# Patient Record
Sex: FEMALE | Race: WHITE | NOT HISPANIC OR LATINO | Employment: UNEMPLOYED | URBAN - NONMETROPOLITAN AREA
[De-identification: names, ages, dates, MRNs, and addresses within clinical notes are randomized per-mention and may not be internally consistent; named-entity substitution may affect disease eponyms.]

---

## 2017-03-01 ENCOUNTER — OFFICE VISIT (OUTPATIENT)
Dept: ENDOCRINOLOGY | Facility: CLINIC | Age: 37
End: 2017-03-01

## 2017-03-01 VITALS
HEART RATE: 105 BPM | BODY MASS INDEX: 39.44 KG/M2 | DIASTOLIC BLOOD PRESSURE: 78 MMHG | WEIGHT: 245.4 LBS | HEIGHT: 66 IN | SYSTOLIC BLOOD PRESSURE: 102 MMHG

## 2017-03-01 DIAGNOSIS — E55.9 VITAMIN D DEFICIENCY: ICD-10-CM

## 2017-03-01 DIAGNOSIS — Z85.850 HISTORY OF THYROID CANCER: ICD-10-CM

## 2017-03-01 DIAGNOSIS — I10 ESSENTIAL HYPERTENSION: ICD-10-CM

## 2017-03-01 DIAGNOSIS — E89.0 POSTOPERATIVE HYPOTHYROIDISM: Primary | ICD-10-CM

## 2017-03-01 DIAGNOSIS — Z72.0 TOBACCO ABUSE: ICD-10-CM

## 2017-03-01 DIAGNOSIS — E11.9 CONTROLLED TYPE 2 DIABETES MELLITUS WITHOUT COMPLICATION, WITHOUT LONG-TERM CURRENT USE OF INSULIN (HCC): ICD-10-CM

## 2017-03-01 PROCEDURE — 99214 OFFICE O/P EST MOD 30 MIN: CPT | Performed by: NURSE PRACTITIONER

## 2017-03-01 RX ORDER — LISINOPRIL 10 MG/1
1 TABLET ORAL DAILY
Refills: 1 | COMMUNITY
Start: 2017-01-26 | End: 2017-12-06

## 2017-03-01 RX ORDER — LEVOTHYROXINE SODIUM 175 UG/1
175 TABLET ORAL DAILY
Qty: 45 TABLET | Refills: 11 | Status: SHIPPED | OUTPATIENT
Start: 2017-03-01 | End: 2018-03-09 | Stop reason: SDUPTHER

## 2017-03-01 NOTE — PROGRESS NOTES
Subjective    Trinasudha Fonseca is a 36 y.o. female. she is here today for follow-up.    History of Present Illness         36 y o comes for followup      reason post surgical hypothyroidism , history of thyroid cancer     ======================================     CONTEXT     Patient initially had enlarging right thryoid nodule  Workup led to [facility] dx Thryoid cancer     ======================================     DURATION AND ALLEVIATING FACTORS      4-18-14 : Tot Thyroidectomy     6-24-14 : HUTCHINS Ablation with TSH of 131      on synthroid  dose is 200mcgs daily , on 175 TSH was 7   dose was increased back to 200 but TSH suppression was below target     she was advised to 6.5 tabs per week but understood only half a tab daily and TSH now 66. With this TSH her Tg is only 0.7      ======================================     QUALITY - Thyroid Cancer well controlled.   Hypothryoidism Not Controlled      June 26, 2014      intense activity in the thyroid bed     June 2014     Tg 5     Aug 2014  TSH 7 , Tg undetectable      June 2015      TSH 80  Tg ab neg  Tg never done?     July 2016  TSH 66  Tg 0.7      ======================================  LOCATION - SIZE      Pathology Report 4-18-14     Right Lobe  a. Follicular Ca with invasion into but not through capsule with one focus of vascular invasion but confined to the thryoid  Size 6.1 cm      b. 3.5 mm Papillary Thyroid Ca     Left Lobe     a. 2 cm Infarcted nodule - no dx of ca made  b. 3 mm micropapillary thyroid Ca      THIS REPORT WAS SENT FOR SCANNING     ======================================  SEVERITY      Has to be considered intermediate due to vascular invasion as reported above  Size of 6 cm can be considered an intermediate risk factor, at least severe enough to have justified HUTCHINS ablation      ======================================  SYMPTOMS     Fatigue, Cold Intolerance, Weight Gain , Depression , Anxiety   have improved   ======================================      TIMING      Cured  June 2015 WBS with TSH of 80 , only physiologic uptake  Tg pending              Evaluation history:    HISTORY- Prior history of follicular and papillary thyroid cancer.  Prior iodine-131 ablation June 2014.              Following the oral administration of 5.0 mCi of iodine-131 whole body  imaging was obtained. Images obtained both 24 hours and 48 hours  following ingestion.          Findings, conclusion-  There is physiological uptake in the salivary glands and nasopharynx.  There is physiological uptake in bladder and bowel.   Whole-body imaging study is otherwise unremarkable. No findings  suggesting recurrent or metastatic disease.  Electronically Signed By- Lion Duggan MD On: 2015-06-12 15:25:17      TSH   Date Value Ref Range Status   07/25/2016 66.00 (H) 0.46 - 4.68 uIU/ml Final     FREE T4   Date Value Ref Range Status   08/08/2014 1.69 0.78 - 2.19 ng/dl Final       Current medications:  Current Outpatient Prescriptions   Medication Sig Dispense Refill   • cetirizine (ZyrTEC) 10 MG tablet Take 10 mg by mouth daily.     • clonazePAM (KlonoPIN) 0.5 MG tablet Take 0.25-0.5 mg by mouth every 12 (twelve) hours as needed for seizures.     • DULoxetine (CYMBALTA) 60 MG capsule TAKE ONE CAPSULE BY MOUTH EVERY DAY  0   • gabapentin (NEURONTIN) 100 MG capsule TAKE 1 TAB BY MOUTH AT BEDTIME  3   • hydrochlorothiazide (HYDRODIURIL) 25 MG tablet Take 25 mg by mouth daily.     • levothyroxine (SYNTHROID, LEVOTHROID) 175 MCG tablet Take 1 tablet by mouth Daily. One tablet everyday except Sunday take 2 tablets 45 tablet 11   • lisinopril (PRINIVIL,ZESTRIL) 10 MG tablet Take 1 tablet by mouth Daily.  1   • metFORMIN (GLUCOPHAGE) 500 MG tablet Take 1 tablet by mouth 2 (Two) Times a Day With Meals. Two tablets po  tablet 11   • montelukast (SINGULAIR) 10 MG tablet Take 10 mg by mouth.     • QUEtiapine (SEROquel) 25 MG tablet TAKE 1 TO 2 TABS BY MOUTH EVERY DAY AT BEDTIME  0   • SITagliptin  (JANUVIA) 100 MG tablet 100 mg by mouth  daily before breakfast 30 tablet 11     No current facility-administered medications for this visit.        The following portions of the patient's history were reviewed and updated as appropriate:   Past Medical History   Diagnosis Date   • Anxiety state    • Endogenous obesity    • History of malignant neoplasm of thyroid    • Postoperative hypothyroidism    • Type 2 diabetes mellitus    • Vitamin D deficiency      Past Surgical History   Procedure Laterality Date   • Thyroidectomy       Family History   Problem Relation Age of Onset   • Hypertension Other    • Thyroid disease Other      OB History     No data available        No Known Allergies  Social History     Social History   • Marital status:      Spouse name: N/A   • Number of children: N/A   • Years of education: N/A     Social History Main Topics   • Smoking status: Current Every Day Smoker   • Smokeless tobacco: None   • Alcohol use None   • Drug use: None   • Sexual activity: Not Asked     Other Topics Concern   • None     Social History Narrative       Review of Systems  Review of Systems   Constitutional: Negative for activity change, appetite change, chills, diaphoresis and fatigue.   HENT: Negative for congestion, dental problem, drooling, ear discharge, ear pain, facial swelling, sneezing, sore throat, tinnitus, trouble swallowing and voice change.    Eyes: Negative for photophobia, pain, discharge, redness, itching and visual disturbance.   Respiratory: Negative for apnea, cough, choking, chest tightness and shortness of breath.    Cardiovascular: Negative for chest pain, palpitations and leg swelling.   Gastrointestinal: Negative for abdominal distention, abdominal pain, constipation, diarrhea, nausea and vomiting.   Endocrine: Negative for cold intolerance, heat intolerance, polydipsia, polyphagia and polyuria.   Genitourinary: Negative for difficulty urinating, dysuria, frequency, hematuria and  "urgency.   Musculoskeletal: Negative for arthralgias, back pain, gait problem, joint swelling, myalgias, neck pain and neck stiffness.   Skin: Negative for color change, pallor, rash and wound.   Allergic/Immunologic: Negative for environmental allergies, food allergies and immunocompromised state.   Neurological: Negative for dizziness, tremors, facial asymmetry, weakness, light-headedness, numbness and headaches.   Hematological: Negative for adenopathy. Does not bruise/bleed easily.   Psychiatric/Behavioral: Negative for agitation, behavioral problems, confusion, decreased concentration and sleep disturbance.        Objective      Visit Vitals   • /78 (BP Location: Right arm, Patient Position: Sitting, Cuff Size: Adult)   • Pulse 105   • Ht 66\" (167.6 cm)   • Wt 245 lb 6.4 oz (111 kg)   • BMI 39.61 kg/m2     Physical Exam   Constitutional: She is oriented to person, place, and time. She appears well-developed and well-nourished. No distress.   HENT:   Head: Normocephalic and atraumatic.   Right Ear: External ear normal.   Left Ear: External ear normal.   Nose: Nose normal.   Eyes: Conjunctivae and EOM are normal. Pupils are equal, round, and reactive to light.   Neck: Normal range of motion. Neck supple. No tracheal deviation present.   Thyroid surgically absent   Cardiovascular: Normal rate, regular rhythm and normal heart sounds.    No murmur heard.  Pulmonary/Chest: Effort normal and breath sounds normal. No respiratory distress. She has no wheezes.   Abdominal: Soft. Bowel sounds are normal. There is no tenderness. There is no rebound and no guarding.   Musculoskeletal: Normal range of motion. She exhibits no edema, tenderness or deformity.   Neurological: She is alert and oriented to person, place, and time. No cranial nerve deficit.   Skin: Skin is warm and dry. No rash noted.   Psychiatric: She has a normal mood and affect. Her behavior is normal. Judgment and thought content normal.       Lab " Review  Lab Results   Component Value Date    TSH 66.00 (H) 07/25/2016     Lab Results   Component Value Date    FREET4 1.69 08/08/2014        Assessment/Plan      1. Postoperative hypothyroidism    2. Controlled type 2 diabetes mellitus without complication, without long-term current use of insulin    3. Essential hypertension    4. Vitamin D deficiency    5. Tobacco abuse    6. History of thyroid cancer    . This diagnosis was discussed and reviewed with the patient including the advantages of drug therapy.     1. Orders placed during this encounter include:  Orders Placed This Encounter   Procedures   • US Thyroid     Schedule in June; need same day as appointment with me     Order Specific Question:   Reason for Exam:     Answer:   history of thyroid cancer   • Comprehensive Metabolic Panel   • Hemoglobin A1c   • TSH   • Vitamin D 25 Hydroxy   • T4, Free   • T3   • Thyroglobulin With Anti-TG       Medications prescribed:  Outpatient Encounter Prescriptions as of 3/1/2017   Medication Sig Dispense Refill   • cetirizine (ZyrTEC) 10 MG tablet Take 10 mg by mouth daily.     • clonazePAM (KlonoPIN) 0.5 MG tablet Take 0.25-0.5 mg by mouth every 12 (twelve) hours as needed for seizures.     • DULoxetine (CYMBALTA) 60 MG capsule TAKE ONE CAPSULE BY MOUTH EVERY DAY  0   • gabapentin (NEURONTIN) 100 MG capsule TAKE 1 TAB BY MOUTH AT BEDTIME  3   • hydrochlorothiazide (HYDRODIURIL) 25 MG tablet Take 25 mg by mouth daily.     • levothyroxine (SYNTHROID, LEVOTHROID) 175 MCG tablet Take 1 tablet by mouth Daily. One tablet everyday except Sunday take 2 tablets 45 tablet 11   • lisinopril (PRINIVIL,ZESTRIL) 10 MG tablet Take 1 tablet by mouth Daily.  1   • metFORMIN (GLUCOPHAGE) 500 MG tablet Take 1 tablet by mouth 2 (Two) Times a Day With Meals. Two tablets po  tablet 11   • montelukast (SINGULAIR) 10 MG tablet Take 10 mg by mouth.     • QUEtiapine (SEROquel) 25 MG tablet TAKE 1 TO 2 TABS BY MOUTH EVERY DAY AT BEDTIME   0   • [DISCONTINUED] levothyroxine (SYNTHROID, LEVOTHROID) 175 MCG tablet Take 1 tablet by mouth Daily. One tablet everyday except Sunday take 2 tablets 45 tablet 11   • SITagliptin (JANUVIA) 100 MG tablet 100 mg by mouth  daily before breakfast 30 tablet 11   • [DISCONTINUED] butalbital-acetaminophen-caffeine (FIORICET) -40 MG per tablet Take  by mouth as needed for headaches.     • [DISCONTINUED] Canagliflozin 100 MG tablet One tablet daily by mouth before breakfast 30 tablet 11   • [DISCONTINUED] Omega-3 Fatty Acids (FISH OIL CONCENTRATE) 1000 MG capsule Take 1 capsule by mouth daily.     • [DISCONTINUED] sertraline (ZOLOFT) 50 MG tablet Take 50 mg by mouth daily.     • [DISCONTINUED] vitamin D (ERGOCALCIFEROL) 66202 UNITS capsule capsule Take 100,000 Units by mouth 1 (one) time per week.       No facility-administered encounter medications on file as of 3/1/2017.          Postoperative Hypothyroidism  History of Thyroid Cancer  [id number]  Stage I     according to [facility] Thyroid Ca Guidelines , postop staging - I consider intermediate risk since vascular invasion per pathology report        Tot Thyroidectomy - 4-14  HUTCHINS Ablation - 6-14     WBS June 2015 with TSH of 80 , nl physiologic uptake  tg ab neg  pending Tg     July 2016   TSH 66  Tg 0.7     Excellent response      Target TSH according to [facility] Recommendation #70 is 0.5 to 2        4-18-14 : Tot Thyroidectomy     6-24-14 : HUTCHINS Ablation with TSH of 131      on synthroid  on 175 TSH was 7   dose was increased back to 200 but TSH suppression was below target     she was advised to 6.5 tabs per week but understood only half a tab daily and TSH now 66. With this TSH her Tg is only 0.7      change to 175 mcgs daily      Oct. 2016     TSH - 19.80     Keep 175 mcg daily and take two tablets on Sunday      Tg ab <1    Feb. 2017    TSH - 1.38    Keep current diet dosage        Need neck ultrasound --schedule here and have an appt after the ultrasound   --     Diabetes       aic at goal on metformin , aic less than 7     Oct. 2016      Hgb A1c - 8.6        metformin 500 mg daily -- increase to one bid for one week then increase to two po bid --just now increased to 1000 mg po BID      invokana 100mg daily --- stopped     Feb. 2017    HgbA1c - 8.7%     Keep metformin     Start Januvia 100 mg one breakfast     Discussed victoza she does not want to inject        --     vit d Def - increase to 50 th u weekly          ----------------------    Tobacco Abuse     Smoking cessation discussed         4. Return in about 3 months (around 6/1/2017) for Recheck.

## 2017-06-06 ENCOUNTER — HOSPITAL ENCOUNTER (OUTPATIENT)
Dept: ULTRASOUND IMAGING | Facility: HOSPITAL | Age: 37
Discharge: HOME | End: 2017-06-06

## 2017-06-12 ENCOUNTER — APPOINTMENT (OUTPATIENT)
Dept: ULTRASOUND IMAGING | Facility: HOSPITAL | Age: 37
End: 2017-06-12

## 2017-06-14 ENCOUNTER — APPOINTMENT (OUTPATIENT)
Dept: ULTRASOUND IMAGING | Facility: HOSPITAL | Age: 37
End: 2017-06-14

## 2017-07-12 ENCOUNTER — HOSPITAL ENCOUNTER (OUTPATIENT)
Dept: ULTRASOUND IMAGING | Facility: HOSPITAL | Age: 37
Discharge: HOME | End: 2017-07-12
Admitting: NURSE PRACTITIONER

## 2017-07-12 ENCOUNTER — OFFICE VISIT (OUTPATIENT)
Dept: ENDOCRINOLOGY | Facility: CLINIC | Age: 37
End: 2017-07-12

## 2017-07-12 VITALS
HEART RATE: 97 BPM | SYSTOLIC BLOOD PRESSURE: 124 MMHG | HEIGHT: 66 IN | WEIGHT: 234.7 LBS | BODY MASS INDEX: 37.72 KG/M2 | DIASTOLIC BLOOD PRESSURE: 76 MMHG

## 2017-07-12 DIAGNOSIS — Z72.0 TOBACCO ABUSE: ICD-10-CM

## 2017-07-12 DIAGNOSIS — E89.0 POSTOPERATIVE HYPOTHYROIDISM: Primary | ICD-10-CM

## 2017-07-12 DIAGNOSIS — E55.9 VITAMIN D DEFICIENCY: ICD-10-CM

## 2017-07-12 DIAGNOSIS — E11.9 CONTROLLED TYPE 2 DIABETES MELLITUS WITHOUT COMPLICATION, WITHOUT LONG-TERM CURRENT USE OF INSULIN (HCC): ICD-10-CM

## 2017-07-12 DIAGNOSIS — Z85.850 HISTORY OF THYROID CANCER: ICD-10-CM

## 2017-07-12 PROCEDURE — 99214 OFFICE O/P EST MOD 30 MIN: CPT | Performed by: NURSE PRACTITIONER

## 2017-07-12 PROCEDURE — 76536 US EXAM OF HEAD AND NECK: CPT

## 2017-07-12 RX ORDER — DULOXETIN HYDROCHLORIDE 60 MG/1
60 CAPSULE, DELAYED RELEASE ORAL DAILY
COMMUNITY
End: 2020-07-31 | Stop reason: SDUPTHER

## 2017-07-12 RX ORDER — MONTELUKAST SODIUM 10 MG/1
10 TABLET ORAL
COMMUNITY
End: 2017-12-06

## 2017-07-12 RX ORDER — CETIRIZINE HYDROCHLORIDE 10 MG/1
10 TABLET ORAL DAILY
COMMUNITY

## 2017-07-12 RX ORDER — QUETIAPINE FUMARATE 25 MG/1
25 TABLET, FILM COATED ORAL
COMMUNITY
End: 2017-12-06

## 2017-07-12 RX ORDER — LISINOPRIL 10 MG/1
10 TABLET ORAL
COMMUNITY
Start: 2017-01-26 | End: 2017-12-06

## 2017-07-12 RX ORDER — ALBUTEROL SULFATE 90 UG/1
180 AEROSOL, METERED RESPIRATORY (INHALATION)
COMMUNITY
End: 2017-12-06

## 2017-07-12 NOTE — PROGRESS NOTES
Subjective    Trina Fonseca is a 37 y.o. female. she is here today for follow-up.    History of Present Illness       37 y o comes for followup     Patient was dealing with depression and stopped all medications for one month -- now restarted      reason post surgical hypothyroidism , history of thyroid cancer     ======================================     CONTEXT     Patient initially had enlarging right thryoid nodule  Workup led to [facility] dx Thryoid cancer     ======================================     DURATION AND ALLEVIATING FACTORS      4-18-14 : Tot Thyroidectomy     6-24-14 : HUTCHNIS Ablation with TSH of 131      on synthroid  dose is 200mcgs daily , on 175 TSH was 7   dose was increased back to 200 but TSH suppression was below target     she was advised to 6.5 tabs per week but understood only half a tab daily and TSH now 66. With this TSH her Tg is only 0.7      ======================================     QUALITY - Thyroid Cancer well controlled.   Hypothryoidism Not Controlled      June 26, 2014      intense activity in the thyroid bed     June 2014     Tg 5     Aug 2014  TSH 7 , Tg undetectable      June 2015      TSH 80  Tg ab neg  Tg never done?     July 2016  TSH 66  Tg 0.7      ======================================  LOCATION - SIZE      Pathology Report 4-18-14     Right Lobe  a. Follicular Ca with invasion into but not through capsule with one focus of vascular invasion but confined to the thryoid  Size 6.1 cm      b. 3.5 mm Papillary Thyroid Ca     Left Lobe     a. 2 cm Infarcted nodule - no dx of ca made  b. 3 mm micropapillary thyroid Ca      THIS REPORT WAS SENT FOR SCANNING     ======================================  SEVERITY      Has to be considered intermediate due to vascular invasion as reported above  Size of 6 cm can be considered an intermediate risk factor, at least severe enough to have justified HUTCHINS ablation      ======================================  SYMPTOMS     Fatigue, Cold  Intolerance, Weight Gain , Depression , Anxiety   have improved   ======================================     TIMING      Cured  June 2015 WBS with TSH of 80 , only physiologic uptake  Tg pending                Evaluation history:     HISTORY- Prior history of follicular and papillary thyroid cancer.  Prior iodine-131 ablation June 2014.           Following the oral administration of 5.0 mCi of iodine-131 whole body  imaging was obtained. Images obtained both 24 hours and 48 hours  following ingestion.        Findings, conclusion-  There is physiological uptake in the salivary glands and nasopharynx.  There is physiological uptake in bladder and bowel.   Whole-body imaging study is otherwise unremarkable. No findings  suggesting recurrent or metastatic disease.  Electronically Signed By- Lion Duggan MD On: 2015-06-12 15:25:17          Evaluation history:  TSH   Date Value Ref Range Status   07/25/2016 66.00 (H) 0.46 - 4.68 uIU/ml Final     Free T4   Date Value Ref Range Status   08/08/2014 1.69 0.78 - 2.19 ng/dl Final       Current medications:  Current Outpatient Prescriptions   Medication Sig Dispense Refill   • lisinopril (PRINIVIL,ZESTRIL) 10 MG tablet Take 10 mg by mouth.     • albuterol (PROVENTIL HFA;VENTOLIN HFA) 108 (90 BASE) MCG/ACT inhaler Inhale 180 mcg.     • cetirizine (ZyrTEC) 10 MG tablet Take 10 mg by mouth daily.     • cetirizine (zyrTEC) 10 MG tablet Take 10 mg by mouth.     • clonazePAM (KlonoPIN) 0.5 MG tablet Take 0.25-0.5 mg by mouth every 12 (twelve) hours as needed for seizures.     • DULoxetine (CYMBALTA) 60 MG capsule TAKE ONE CAPSULE BY MOUTH EVERY DAY  0   • DULoxetine (CYMBALTA) 60 MG capsule Take 60 mg by mouth.     • gabapentin (NEURONTIN) 100 MG capsule TAKE 1 TAB BY MOUTH AT BEDTIME  3   • hydrochlorothiazide (HYDRODIURIL) 25 MG tablet Take 25 mg by mouth daily.     • levothyroxine (SYNTHROID, LEVOTHROID) 175 MCG tablet Take 1 tablet by mouth Daily. One tablet everyday except Sunday  take 2 tablets 45 tablet 11   • lisinopril (PRINIVIL,ZESTRIL) 10 MG tablet Take 1 tablet by mouth Daily.  1   • metFORMIN (GLUCOPHAGE) 500 MG tablet Take 1 tablet by mouth 2 (Two) Times a Day With Meals. Two tablets po  tablet 11   • METFORMIN HCL PO Take  by mouth.     • montelukast (SINGULAIR) 10 MG tablet Take 10 mg by mouth.     • montelukast (SINGULAIR) 10 MG tablet Take 10 mg by mouth.     • QUEtiapine (SEROquel) 25 MG tablet TAKE 1 TO 2 TABS BY MOUTH EVERY DAY AT BEDTIME  0   • QUEtiapine (SEROquel) 25 MG tablet Take 25 mg by mouth.     • SITagliptin (JANUVIA) 100 MG tablet 100 mg by mouth  daily before breakfast 30 tablet 11     No current facility-administered medications for this visit.        The following portions of the patient's history were reviewed and updated as appropriate:   Past Medical History:   Diagnosis Date   • Anxiety state    • Endogenous obesity    • History of malignant neoplasm of thyroid    • Postoperative hypothyroidism    • Type 2 diabetes mellitus    • Vitamin D deficiency      Past Surgical History:   Procedure Laterality Date   • THYROIDECTOMY       Family History   Problem Relation Age of Onset   • Hypertension Other    • Thyroid disease Other      OB History     No data available        No Known Allergies  Social History     Social History   • Marital status:      Spouse name: N/A   • Number of children: N/A   • Years of education: N/A     Social History Main Topics   • Smoking status: Current Every Day Smoker   • Smokeless tobacco: Never Used   • Alcohol use No   • Drug use: None   • Sexual activity: Not Asked     Other Topics Concern   • None     Social History Narrative       Review of Systems  Review of Systems   Constitutional: Negative for activity change, appetite change, chills, diaphoresis and fatigue.   HENT: Negative for congestion, dental problem, drooling, ear discharge, ear pain, facial swelling, sneezing, sore throat, tinnitus, trouble swallowing  "and voice change.    Eyes: Negative for photophobia, pain, discharge, redness, itching and visual disturbance.   Respiratory: Negative for apnea, cough, choking, chest tightness and shortness of breath.    Cardiovascular: Negative for chest pain, palpitations and leg swelling.   Gastrointestinal: Negative for abdominal distention, abdominal pain, constipation, diarrhea, nausea and vomiting.   Endocrine: Negative for cold intolerance, heat intolerance, polydipsia, polyphagia and polyuria.   Genitourinary: Negative for difficulty urinating, dysuria, frequency, hematuria and urgency.   Musculoskeletal: Negative for arthralgias, back pain, gait problem, joint swelling, myalgias, neck pain and neck stiffness.   Skin: Negative for color change, pallor, rash and wound.   Allergic/Immunologic: Negative for environmental allergies, food allergies and immunocompromised state.   Neurological: Negative for dizziness, tremors, facial asymmetry, weakness, light-headedness, numbness and headaches.   Hematological: Negative for adenopathy. Does not bruise/bleed easily.   Psychiatric/Behavioral: Negative for agitation, behavioral problems, confusion, decreased concentration and sleep disturbance.        Objective    /76 (BP Location: Left arm, Patient Position: Sitting, Cuff Size: Adult)  Pulse 97  Ht 66\" (167.6 cm)  Wt 234 lb 11.2 oz (106 kg)  BMI 37.88 kg/m2  Physical Exam   Constitutional: She is oriented to person, place, and time. She appears well-developed and well-nourished. No distress.   HENT:   Head: Normocephalic and atraumatic.   Right Ear: External ear normal.   Left Ear: External ear normal.   Nose: Nose normal.   Eyes: Conjunctivae and EOM are normal. Pupils are equal, round, and reactive to light.   Neck: Normal range of motion. Neck supple. No tracheal deviation present. No thyromegaly present.   Cardiovascular: Normal rate, regular rhythm and normal heart sounds.    No murmur heard.  Pulmonary/Chest: " Effort normal and breath sounds normal. No respiratory distress. She has no wheezes.   Abdominal: Soft. Bowel sounds are normal. There is no tenderness. There is no rebound and no guarding.   Musculoskeletal: Normal range of motion. She exhibits no edema, tenderness or deformity.   Neurological: She is alert and oriented to person, place, and time. No cranial nerve deficit.   Skin: Skin is warm and dry. No rash noted.   Psychiatric: She has a normal mood and affect. Her behavior is normal. Judgment and thought content normal.       Lab Review  Lab Results   Component Value Date    TSH 66.00 (H) 07/25/2016     Lab Results   Component Value Date    FREET4 1.69 08/08/2014        Assessment/Plan      1. Postoperative hypothyroidism    2. History of thyroid cancer    3. Controlled type 2 diabetes mellitus without complication, without long-term current use of insulin    4. Tobacco abuse    5. Vitamin D deficiency    . This diagnosis was discussed and reviewed with the patient including the advantages of drug therapy.     1. Orders placed during this encounter include:  Orders Placed This Encounter   Procedures   • TSH   • Comprehensive Metabolic Panel   • TSH   • Vitamin D 25 Hydroxy   • Hemoglobin A1c   • Thyroglobulin With Anti-TG   • CBC & Differential     Order Specific Question:   Manual Differential     Answer:   No       Medications prescribed:  Outpatient Encounter Prescriptions as of 7/12/2017   Medication Sig Dispense Refill   • lisinopril (PRINIVIL,ZESTRIL) 10 MG tablet Take 10 mg by mouth.     • albuterol (PROVENTIL HFA;VENTOLIN HFA) 108 (90 BASE) MCG/ACT inhaler Inhale 180 mcg.     • cetirizine (ZyrTEC) 10 MG tablet Take 10 mg by mouth daily.     • cetirizine (zyrTEC) 10 MG tablet Take 10 mg by mouth.     • clonazePAM (KlonoPIN) 0.5 MG tablet Take 0.25-0.5 mg by mouth every 12 (twelve) hours as needed for seizures.     • DULoxetine (CYMBALTA) 60 MG capsule TAKE ONE CAPSULE BY MOUTH EVERY DAY  0   •  DULoxetine (CYMBALTA) 60 MG capsule Take 60 mg by mouth.     • gabapentin (NEURONTIN) 100 MG capsule TAKE 1 TAB BY MOUTH AT BEDTIME  3   • hydrochlorothiazide (HYDRODIURIL) 25 MG tablet Take 25 mg by mouth daily.     • levothyroxine (SYNTHROID, LEVOTHROID) 175 MCG tablet Take 1 tablet by mouth Daily. One tablet everyday except Sunday take 2 tablets 45 tablet 11   • lisinopril (PRINIVIL,ZESTRIL) 10 MG tablet Take 1 tablet by mouth Daily.  1   • metFORMIN (GLUCOPHAGE) 500 MG tablet Take 1 tablet by mouth 2 (Two) Times a Day With Meals. Two tablets po  tablet 11   • METFORMIN HCL PO Take  by mouth.     • montelukast (SINGULAIR) 10 MG tablet Take 10 mg by mouth.     • montelukast (SINGULAIR) 10 MG tablet Take 10 mg by mouth.     • QUEtiapine (SEROquel) 25 MG tablet TAKE 1 TO 2 TABS BY MOUTH EVERY DAY AT BEDTIME  0   • QUEtiapine (SEROquel) 25 MG tablet Take 25 mg by mouth.     • SITagliptin (JANUVIA) 100 MG tablet 100 mg by mouth  daily before breakfast 30 tablet 11     No facility-administered encounter medications on file as of 7/12/2017.      Postoperative Hypothyroidism  History of Thyroid Cancer  [id number]  Stage I     according to [facility] Thyroid Ca Guidelines , postop staging - I consider intermediate risk since vascular invasion per pathology report        Tot Thyroidectomy - 4-14  HUTCHINS Ablation - 6-14     WBS June 2015 with TSH of 80 , nl physiologic uptake  tg ab neg  pending Tg     July 2016   TSH 66  Tg 0.7     Excellent response      Target TSH according to [facility] Recommendation #70 is 0.5 to 2        4-18-14 : Tot Thyroidectomy     6-24-14 : HUTCHINS Ablation with TSH of 131      on synthroid  on 175 TSH was 7   dose was increased back to 200 but TSH suppression was below target     she was advised to 6.5 tabs per week but understood only half a tab daily and TSH now 66. With this TSH her Tg is only 0.7      change to 175 mcgs daily      Oct. 2016     TSH - 19.80     Keep 175 mcg daily and take two tablets on  Sunday      Tg ab <1     Feb. 2017     TSH - 1.38     Keep current diet dosage         May 2017    TSH - 18.19     Taking 175 mcg one tablet Monday and Friday and Two Saturday and Sunday             Need neck ultrasound --schedule here and have an appt after the ultrasound     Repeat thyroid u/s July 2017    No thyroid tissue remnants    --     Diabetes      aic at goal on metformin , aic less than 7     Oct. 2016      Hgb A1c - 8.6        metformin 500 mg daily -- increase to one bid for one week then increase to two po bid --just now increased to 1000 mg po BID      invokana 100mg daily --- stopped      Feb. 2017     HgbA1c - 8.7%      Keep metformin      Start Januvia 100 mg one breakfast      Discussed victoza she does not want to inject     May 2017    HgbA1c 7.4%         --     vit d Def - increase to 50 th u weekly       May 2017    Vitamin d - 17     Stopped medicine - restart    ----------------------     Tobacco Abuse      Smoking cessation discussed             4. Return in about 4 months (around 11/12/2017) for Recheck.

## 2017-07-20 ENCOUNTER — TELEPHONE (OUTPATIENT)
Dept: FAMILY MEDICINE CLINIC | Facility: CLINIC | Age: 37
End: 2017-07-20

## 2017-08-11 ENCOUNTER — TELEPHONE (OUTPATIENT)
Dept: ENDOCRINOLOGY | Facility: CLINIC | Age: 37
End: 2017-08-11

## 2017-08-11 NOTE — TELEPHONE ENCOUNTER
----- Message from AIDEN Estrada sent at 8/11/2017 12:59 PM CDT -----  Thyroid level normal no change in dosage   ----- Message -----     From: Aleena Mcgraw MA     Sent: 8/11/2017   9:47 AM       To: AIDEN Estrada    Laid it on your desk.   ----- Message -----     From: Elisha Dean     Sent: 8/11/2017   9:37 AM       To: Aleena Mcgraw MA    HAD BLOOD WORK DONE IN [place] AND WANTS TO KNOW IF RESULTS HAVE BEEN FAXED OVER. PLEASE CALL HER [phone]

## 2017-09-15 ENCOUNTER — OFFICE VISIT (OUTPATIENT)
Dept: ENDOCRINOLOGY | Facility: CLINIC | Age: 37
End: 2017-09-15

## 2017-09-15 VITALS
HEART RATE: 90 BPM | DIASTOLIC BLOOD PRESSURE: 84 MMHG | SYSTOLIC BLOOD PRESSURE: 126 MMHG | WEIGHT: 237 LBS | HEIGHT: 66 IN | BODY MASS INDEX: 38.09 KG/M2

## 2017-09-15 DIAGNOSIS — E11.9 CONTROLLED TYPE 2 DIABETES MELLITUS WITHOUT COMPLICATION, WITHOUT LONG-TERM CURRENT USE OF INSULIN (HCC): Primary | ICD-10-CM

## 2017-09-15 DIAGNOSIS — Z85.850 HISTORY OF THYROID CANCER: ICD-10-CM

## 2017-09-15 DIAGNOSIS — E55.9 VITAMIN D DEFICIENCY: ICD-10-CM

## 2017-09-15 DIAGNOSIS — Z72.0 TOBACCO ABUSE: ICD-10-CM

## 2017-09-15 DIAGNOSIS — I10 ESSENTIAL HYPERTENSION: ICD-10-CM

## 2017-09-15 DIAGNOSIS — E89.0 POSTOPERATIVE HYPOTHYROIDISM: ICD-10-CM

## 2017-09-15 PROCEDURE — 99214 OFFICE O/P EST MOD 30 MIN: CPT | Performed by: NURSE PRACTITIONER

## 2017-09-15 RX ORDER — ACETAMINOPHEN AND CODEINE PHOSPHATE 300; 30 MG/1; MG/1
TABLET ORAL
COMMUNITY
Start: 2017-09-13 | End: 2017-12-06

## 2017-09-15 NOTE — PROGRESS NOTES
Subjective    Trina Fonseca is a 37 y.o. female. she is here today for follow-up.    History of Present Illness     37 y o comes for followup      Patient was dealing with depression and stopped all medications for one month -- now restarted      reason post surgical hypothyroidism , history of thyroid cancer     ======================================     CONTEXT     Patient initially had enlarging right thryoid nodule  Workup led to [facility] dx Thryoid cancer     ======================================     DURATION AND ALLEVIATING FACTORS      4-18-14 : Tot Thyroidectomy     6-24-14 : HUTCHINS Ablation with TSH of 131      on synthroid  dose is 200mcgs daily , on 175 TSH was 7   dose was increased back to 200 but TSH suppression was below target     she was advised to 6.5 tabs per week but understood only half a tab daily and TSH now 66. With this TSH her Tg is only 0.7      ======================================     QUALITY - Thyroid Cancer well controlled.   Hypothryoidism Not Controlled      June 26, 2014      intense activity in the thyroid bed     June 2014     Tg 5     Aug 2014  TSH 7 , Tg undetectable      June 2015      TSH 80  Tg ab neg  Tg never done?     July 2016  TSH 66  Tg 0.7      ======================================  LOCATION - SIZE      Pathology Report 4-18-14     Right Lobe  a. Follicular Ca with invasion into but not through capsule with one focus of vascular invasion but confined to the thryoid  Size 6.1 cm      b. 3.5 mm Papillary Thyroid Ca     Left Lobe     a. 2 cm Infarcted nodule - no dx of ca made  b. 3 mm micropapillary thyroid Ca      THIS REPORT WAS SENT FOR SCANNING     ======================================  SEVERITY      Has to be considered intermediate due to vascular invasion as reported above  Size of 6 cm can be considered an intermediate risk factor, at least severe enough to have justified HUTCHINS ablation      ======================================  SYMPTOMS     Fatigue, Cold  Intolerance, Weight Gain , Depression , Anxiety   have improved   ======================================     TIMING      Cured  June 2015 WBS with TSH of 80 , only physiologic uptake  Tg pending               Evaluation history:     HISTORY- Prior history of follicular and papillary thyroid cancer.  Prior iodine-131 ablation June 2014.           Following the oral administration of 5.0 mCi of iodine-131 whole body  imaging was obtained. Images obtained both 24 hours and 48 hours  following ingestion.        Findings, conclusion-  There is physiological uptake in the salivary glands and nasopharynx.  There is physiological uptake in bladder and bowel.   Whole-body imaging study is otherwise unremarkable. No findings  suggesting recurrent or metastatic disease.  Electronically Signed By- Lion Duggan MD On: 2015-06-12 15:25:17                  The following portions of the patient's history were reviewed and updated as appropriate:   Past Medical History:   Diagnosis Date   • Anxiety state    • Endogenous obesity    • History of malignant neoplasm of thyroid    • Postoperative hypothyroidism    • Type 2 diabetes mellitus    • Vitamin D deficiency      Past Surgical History:   Procedure Laterality Date   • THYROIDECTOMY       Family History   Problem Relation Age of Onset   • Hypertension Other    • Thyroid disease Other      OB History     No data available        Current Outpatient Prescriptions   Medication Sig Dispense Refill   • acetaminophen-codeine (TYLENOL #3) 300-30 MG per tablet      • albuterol (PROVENTIL HFA;VENTOLIN HFA) 108 (90 BASE) MCG/ACT inhaler Inhale 180 mcg.     • cetirizine (ZyrTEC) 10 MG tablet Take 10 mg by mouth daily.     • cetirizine (zyrTEC) 10 MG tablet Take 10 mg by mouth.     • cholecalciferol (VITAMIN D3) 52954 units capsule Take 5 capsules by mouth 1 (One) Time Per Week. Please change to 50,000 units weekly 4 capsule 11   • clonazePAM (KlonoPIN) 0.5 MG tablet Take 0.25-0.5 mg by mouth  every 12 (twelve) hours as needed for seizures.     • DULoxetine (CYMBALTA) 60 MG capsule TAKE ONE CAPSULE BY MOUTH EVERY DAY  0   • DULoxetine (CYMBALTA) 60 MG capsule Take 60 mg by mouth.     • Empagliflozin (JARDIANCE) 10 MG tablet Take 10 mg by mouth Daily. One tablet daily before breakfast 30 tablet 11   • gabapentin (NEURONTIN) 100 MG capsule TAKE 1 TAB BY MOUTH AT BEDTIME  3   • hydrochlorothiazide (HYDRODIURIL) 25 MG tablet Take 25 mg by mouth daily.     • levothyroxine (SYNTHROID, LEVOTHROID) 175 MCG tablet Take 1 tablet by mouth Daily. One tablet everyday except Sunday take 2 tablets 45 tablet 11   • lisinopril (PRINIVIL,ZESTRIL) 10 MG tablet Take 1 tablet by mouth Daily.  1   • lisinopril (PRINIVIL,ZESTRIL) 10 MG tablet Take 10 mg by mouth.     • metFORMIN (GLUCOPHAGE) 1000 MG tablet      • metFORMIN (GLUCOPHAGE) 500 MG tablet Take 1 tablet by mouth 2 (Two) Times a Day With Meals. Two tablets po  tablet 11   • METFORMIN HCL PO Take  by mouth.     • montelukast (SINGULAIR) 10 MG tablet Take 10 mg by mouth.     • montelukast (SINGULAIR) 10 MG tablet Take 10 mg by mouth.     • QUEtiapine (SEROquel) 25 MG tablet TAKE 1 TO 2 TABS BY MOUTH EVERY DAY AT BEDTIME  0   • QUEtiapine (SEROquel) 25 MG tablet Take 25 mg by mouth.     • SITagliptin (JANUVIA) 100 MG tablet 100 mg by mouth  daily before breakfast 30 tablet 3     No current facility-administered medications for this visit.      No Known Allergies  Social History     Social History   • Marital status:      Spouse name: N/A   • Number of children: N/A   • Years of education: N/A     Social History Main Topics   • Smoking status: Current Every Day Smoker   • Smokeless tobacco: Never Used      Comment: smoking cessation encouraged    • Alcohol use No   • Drug use: None   • Sexual activity: Not Asked     Other Topics Concern   • None     Social History Narrative       Review of Systems  Review of Systems   Constitutional: Negative for activity  "change, appetite change, diaphoresis and fatigue.   HENT: Negative for congestion, dental problem, drooling, facial swelling, sneezing, sore throat, tinnitus, trouble swallowing and voice change.    Eyes: Negative for photophobia, pain, discharge, redness, itching and visual disturbance.   Respiratory: Negative for apnea, cough, choking, chest tightness and shortness of breath.    Cardiovascular: Negative for chest pain, palpitations and leg swelling.   Gastrointestinal: Negative for abdominal distention, abdominal pain, constipation, diarrhea, nausea and vomiting.   Endocrine: Negative for cold intolerance, heat intolerance, polydipsia, polyphagia and polyuria.   Genitourinary: Negative for difficulty urinating, dysuria, frequency, hematuria and urgency.   Musculoskeletal: Negative for arthralgias, back pain, gait problem, joint swelling, myalgias, neck pain and neck stiffness.   Skin: Negative for color change, pallor, rash and wound.   Allergic/Immunologic: Negative for environmental allergies, food allergies and immunocompromised state.   Neurological: Negative for dizziness, tremors, facial asymmetry, weakness, light-headedness, numbness and headaches.   Hematological: Negative for adenopathy. Does not bruise/bleed easily.   Psychiatric/Behavioral: Negative for agitation, behavioral problems, confusion and sleep disturbance.        Objective    /84 (BP Location: Right arm, Patient Position: Sitting, Cuff Size: Adult)  Pulse 90  Ht 66\" (167.6 cm)  Wt 237 lb (108 kg)  BMI 38.25 kg/m2  Physical Exam   Constitutional: She is oriented to person, place, and time. She appears well-developed and well-nourished. No distress.   HENT:   Head: Normocephalic and atraumatic.   Right Ear: External ear normal.   Left Ear: External ear normal.   Nose: Nose normal.   Eyes: Conjunctivae and EOM are normal. Pupils are equal, round, and reactive to light.   Neck: Normal range of motion. Neck supple. No tracheal deviation " present. No thyromegaly present.   Thyroid surgically absent   Cardiovascular: Normal rate, regular rhythm and normal heart sounds.    No murmur heard.  Pulmonary/Chest: Effort normal and breath sounds normal. No respiratory distress. She has no wheezes.   Abdominal: Soft. Bowel sounds are normal. There is no tenderness. There is no rebound and no guarding.   Musculoskeletal: Normal range of motion. She exhibits no edema, tenderness or deformity.   Neurological: She is alert and oriented to person, place, and time. No cranial nerve deficit.   Skin: Skin is warm and dry. No rash noted.   Psychiatric: She has a normal mood and affect. Her behavior is normal. Judgment and thought content normal.       Lab Review  Glucose   Date Value   07/25/2016 181 mg/dl (H)   08/08/2014 102 mg/dl (H)   04/19/2014 109 mg/dL (H)     Sodium (mmol/L)   Date Value   07/25/2016 137   08/08/2014 142   04/19/2014 137     Potassium (mmol/L)   Date Value   07/25/2016 3.7   08/08/2014 3.9   04/19/2014 3.7     Chloride (mmol/L)   Date Value   07/25/2016 97   08/08/2014 105   04/19/2014 102     CO2 (mmol/L)   Date Value   07/25/2016 30   08/08/2014 26   04/19/2014 29     BUN   Date Value   07/25/2016 10 mg/dl   08/08/2014 8 mg/dl   04/19/2014 7 mg/dL     Creatinine   Date Value   07/25/2016 0.8 mg/dl   08/08/2014 0.9 mg/dl   04/19/2014 0.71 mg/dL     Hemoglobin A1C (%TotHgb)   Date Value   07/25/2016 6.9 (H)       Assessment/Plan      1. Controlled type 2 diabetes mellitus without complication, without long-term current use of insulin    2. Postoperative hypothyroidism    3. Essential hypertension    4. Vitamin D deficiency    5. History of thyroid cancer    6. Tobacco abuse    .    Medications prescribed:  Outpatient Encounter Prescriptions as of 9/15/2017   Medication Sig Dispense Refill   • acetaminophen-codeine (TYLENOL #3) 300-30 MG per tablet      • albuterol (PROVENTIL HFA;VENTOLIN HFA) 108 (90 BASE) MCG/ACT inhaler Inhale 180 mcg.     •  cetirizine (ZyrTEC) 10 MG tablet Take 10 mg by mouth daily.     • cetirizine (zyrTEC) 10 MG tablet Take 10 mg by mouth.     • cholecalciferol (VITAMIN D3) 49646 units capsule Take 5 capsules by mouth 1 (One) Time Per Week. Please change to 50,000 units weekly 4 capsule 11   • clonazePAM (KlonoPIN) 0.5 MG tablet Take 0.25-0.5 mg by mouth every 12 (twelve) hours as needed for seizures.     • DULoxetine (CYMBALTA) 60 MG capsule TAKE ONE CAPSULE BY MOUTH EVERY DAY  0   • DULoxetine (CYMBALTA) 60 MG capsule Take 60 mg by mouth.     • Empagliflozin (JARDIANCE) 10 MG tablet Take 10 mg by mouth Daily. One tablet daily before breakfast 30 tablet 11   • gabapentin (NEURONTIN) 100 MG capsule TAKE 1 TAB BY MOUTH AT BEDTIME  3   • hydrochlorothiazide (HYDRODIURIL) 25 MG tablet Take 25 mg by mouth daily.     • levothyroxine (SYNTHROID, LEVOTHROID) 175 MCG tablet Take 1 tablet by mouth Daily. One tablet everyday except Sunday take 2 tablets 45 tablet 11   • lisinopril (PRINIVIL,ZESTRIL) 10 MG tablet Take 1 tablet by mouth Daily.  1   • lisinopril (PRINIVIL,ZESTRIL) 10 MG tablet Take 10 mg by mouth.     • metFORMIN (GLUCOPHAGE) 1000 MG tablet      • metFORMIN (GLUCOPHAGE) 500 MG tablet Take 1 tablet by mouth 2 (Two) Times a Day With Meals. Two tablets po  tablet 11   • METFORMIN HCL PO Take  by mouth.     • montelukast (SINGULAIR) 10 MG tablet Take 10 mg by mouth.     • montelukast (SINGULAIR) 10 MG tablet Take 10 mg by mouth.     • QUEtiapine (SEROquel) 25 MG tablet TAKE 1 TO 2 TABS BY MOUTH EVERY DAY AT BEDTIME  0   • QUEtiapine (SEROquel) 25 MG tablet Take 25 mg by mouth.     • SITagliptin (JANUVIA) 100 MG tablet 100 mg by mouth  daily before breakfast 30 tablet 3     No facility-administered encounter medications on file as of 9/15/2017.        Orders placed during this encounter include:  Orders Placed This Encounter   Procedures   • Comprehensive Metabolic Panel   • Hemoglobin A1c   • TSH   • Protein / Creatinine  Ratio, Urine   • Microalbumin / Creatinine Urine Ratio   • Vitamin D 25 Hydroxy   • T4, Free   • Thyroglobulin With Anti-TG   • CBC & Differential     Order Specific Question:   Manual Differential     Answer:   No         Postoperative Hypothyroidism  History of Thyroid Cancer  [id number]  Stage I     according to [facility] Thyroid Ca Guidelines , postop staging - I consider intermediate risk since vascular invasion per pathology report        Tot Thyroidectomy - 4-14  HUTCHINS Ablation - 6-14     WBS June 2015 with TSH of 80 , nl physiologic uptake  tg ab neg  pending Tg     July 2016   TSH 66  Tg 0.7     Excellent response      Target TSH according to [facility] Recommendation #70 is 0.5 to 2        4-18-14 : Tot Thyroidectomy     6-24-14 : HUTCHINS Ablation with TSH of 131      on synthroid  on 175 TSH was 7   dose was increased back to 200 but TSH suppression was below target     she was advised to 6.5 tabs per week but understood only half a tab daily and TSH now 66. With this TSH her Tg is only 0.7      change to 175 mcgs daily      Oct. 2016     TSH - 19.80     Keep 175 mcg daily and take two tablets on Sunday      Tg ab <1     Feb. 2017     TSH - 1.38     Keep current diet dosage            May 2017     TSH - 18.19      Taking 175 mcg one tablet Monday and Friday and Two Saturday and Sunday Sept 2017     TSH - 3.78     Taking 175 taking two every other day        Need neck ultrasound --schedule here and have an appt after the ultrasound      Repeat thyroid u/s July 2017     No thyroid tissue remnants     --     Diabetes      aic at goal on metformin , aic less than 7     Oct. 2016      Hgb A1c - 8.6        metformin 500 mg daily -- increase to one bid for one week then increase to two po bid --just now increased to 1000 mg po BID      invokana 100mg daily --- stopped      Feb. 2017     HgbA1c - 8.7%      Keep metformin      Start Januvia 100 mg one breakfast      Discussed victoza she does not want to inject      May 2017      HgbA1c 7.4%     Sept 2017    HgbA1c 7.5%       Keep metformin     Januvia not covered    Call in jardiance 10 mg one daily     Discussed side effects         --     vit d Def - increase to 50 th u weekly       May 2017     Vitamin d - 17      Stopped medicine - restart     Sept 2017    Vitamin d - 15.4     Start 50,000 units once weekly    ----------------------     Tobacco Abuse      Smoking cessation discussed            4. Follow-up: Return in about 3 months (around 12/15/2017) for Recheck.

## 2017-12-06 ENCOUNTER — APPOINTMENT (OUTPATIENT)
Dept: LAB | Facility: HOSPITAL | Age: 37
End: 2017-12-06

## 2017-12-06 ENCOUNTER — OFFICE VISIT (OUTPATIENT)
Dept: ENDOCRINOLOGY | Facility: CLINIC | Age: 37
End: 2017-12-06

## 2017-12-06 VITALS
HEART RATE: 105 BPM | DIASTOLIC BLOOD PRESSURE: 78 MMHG | WEIGHT: 233 LBS | HEIGHT: 66 IN | BODY MASS INDEX: 37.45 KG/M2 | SYSTOLIC BLOOD PRESSURE: 122 MMHG

## 2017-12-06 DIAGNOSIS — E55.9 VITAMIN D DEFICIENCY: ICD-10-CM

## 2017-12-06 DIAGNOSIS — Z72.0 TOBACCO ABUSE: ICD-10-CM

## 2017-12-06 DIAGNOSIS — E11.9 CONTROLLED TYPE 2 DIABETES MELLITUS WITHOUT COMPLICATION, WITHOUT LONG-TERM CURRENT USE OF INSULIN (HCC): Primary | ICD-10-CM

## 2017-12-06 DIAGNOSIS — Z85.850 HISTORY OF THYROID CANCER: ICD-10-CM

## 2017-12-06 DIAGNOSIS — I10 ESSENTIAL HYPERTENSION: ICD-10-CM

## 2017-12-06 DIAGNOSIS — E89.0 POSTOPERATIVE HYPOTHYROIDISM: ICD-10-CM

## 2017-12-06 PROCEDURE — 86800 THYROGLOBULIN ANTIBODY: CPT | Performed by: NURSE PRACTITIONER

## 2017-12-06 PROCEDURE — 36415 COLL VENOUS BLD VENIPUNCTURE: CPT | Performed by: NURSE PRACTITIONER

## 2017-12-06 PROCEDURE — 99214 OFFICE O/P EST MOD 30 MIN: CPT | Performed by: NURSE PRACTITIONER

## 2017-12-06 PROCEDURE — 84432 ASSAY OF THYROGLOBULIN: CPT | Performed by: NURSE PRACTITIONER

## 2017-12-06 RX ORDER — TRAZODONE HYDROCHLORIDE 50 MG/1
50 TABLET ORAL NIGHTLY
COMMUNITY

## 2017-12-06 RX ORDER — MELOXICAM 7.5 MG/1
7.5 TABLET ORAL DAILY
COMMUNITY
End: 2018-05-10 | Stop reason: ALTCHOICE

## 2017-12-06 NOTE — PROGRESS NOTES
Subjective    Trina Fonseca is a 37 y.o. female. she is here today for follow-up.    History of Present Illness  37 y o comes for followup      Patient was dealing with depression and stopped all medications for one month -- now restarted      reason post surgical hypothyroidism , history of thyroid cancer     ======================================     CONTEXT     Patient initially had enlarging right thryoid nodule  Workup led to [facility] dx Thryoid cancer     ======================================     DURATION AND ALLEVIATING FACTORS      4-18-14 : Tot Thyroidectomy     6-24-14 : HUTCHINS Ablation with TSH of 131      on synthroid  dose is 200mcgs daily , on 175 TSH was 7   dose was increased back to 200 but TSH suppression was below target     she was advised to 6.5 tabs per week but understood only half a tab daily and TSH now 66. With this TSH her Tg is only 0.7      ======================================     QUALITY - Thyroid Cancer well controlled.   Hypothryoidism Not Controlled      June 26, 2014      intense activity in the thyroid bed     June 2014     Tg 5     Aug 2014  TSH 7 , Tg undetectable      June 2015      TSH 80  Tg ab neg  Tg never done?     July 2016  TSH 66  Tg 0.7      ======================================  LOCATION - SIZE      Pathology Report 4-18-14     Right Lobe  a. Follicular Ca with invasion into but not through capsule with one focus of vascular invasion but confined to the thryoid  Size 6.1 cm      b. 3.5 mm Papillary Thyroid Ca     Left Lobe     a. 2 cm Infarcted nodule - no dx of ca made  b. 3 mm micropapillary thyroid Ca      THIS REPORT WAS SENT FOR SCANNING     ======================================  SEVERITY      Has to be considered intermediate due to vascular invasion as reported above  Size of 6 cm can be considered an intermediate risk factor, at least severe enough to have justified HUTCHINS ablation      ======================================  SYMPTOMS     Fatigue, Cold Intolerance,  Weight Gain , Depression , Anxiety   have improved   ======================================     TIMING      Cured  June 2015 WBS with TSH of 80 , only physiologic uptake  Tg pending               Evaluation history:     HISTORY- Prior history of follicular and papillary thyroid cancer.  Prior iodine-131 ablation June 2014.           Following the oral administration of 5.0 mCi of iodine-131 whole body  imaging was obtained. Images obtained both 24 hours and 48 hours  following ingestion.        Findings, conclusion-  There is physiological uptake in the salivary glands and nasopharynx.  There is physiological uptake in bladder and bowel.   Whole-body imaging study is otherwise unremarkable. No findings  suggesting recurrent or metastatic disease.  Electronically Signed By- Lion Duggan MD On: 2015-06-12 15:25:17       The following portions of the patient's history were reviewed and updated as appropriate:   Past Medical History:   Diagnosis Date   • Anxiety state    • Endogenous obesity    • History of malignant neoplasm of thyroid    • Postoperative hypothyroidism    • Type 2 diabetes mellitus    • Vitamin D deficiency      Past Surgical History:   Procedure Laterality Date   • THYROIDECTOMY       Family History   Problem Relation Age of Onset   • Hypertension Other    • Thyroid disease Other      OB History     No data available        Current Outpatient Prescriptions   Medication Sig Dispense Refill   • cetirizine (zyrTEC) 10 MG tablet Take 10 mg by mouth.     • DULoxetine (CYMBALTA) 60 MG capsule Take 60 mg by mouth.     • gabapentin (NEURONTIN) 100 MG capsule TAKE 1 TAB BY MOUTH AT BEDTIME  3   • hydrochlorothiazide (HYDRODIURIL) 25 MG tablet Take 25 mg by mouth daily.     • levothyroxine (SYNTHROID, LEVOTHROID) 175 MCG tablet Take 1 tablet by mouth Daily. One tablet everyday except Sunday take 2 tablets 45 tablet 11   • meloxicam (MOBIC) 7.5 MG tablet Take 7.5 mg by mouth Daily.     • metFORMIN (GLUCOPHAGE)  1000 MG tablet      • montelukast (SINGULAIR) 10 MG tablet Take 10 mg by mouth.     • traZODone (DESYREL) 50 MG tablet Take 50 mg by mouth Every Night.     • linagliptin (TRADJENTA) 5 MG tablet tablet One tablet daily before breakfast every day 30 tablet 11     No current facility-administered medications for this visit.      No Known Allergies  Social History     Social History   • Marital status:      Spouse name: N/A   • Number of children: N/A   • Years of education: N/A     Social History Main Topics   • Smoking status: Current Every Day Smoker   • Smokeless tobacco: Never Used      Comment: smoking cessation encouraged    • Alcohol use No   • Drug use: None   • Sexual activity: Not Asked     Other Topics Concern   • None     Social History Narrative       Review of Systems  Review of Systems   Constitutional: Negative for activity change, appetite change, diaphoresis and fatigue.   HENT: Negative for congestion, dental problem, drooling, facial swelling, sneezing, sore throat, tinnitus, trouble swallowing and voice change.    Eyes: Negative for photophobia, pain, discharge, redness, itching and visual disturbance.   Respiratory: Negative for apnea, cough, choking, chest tightness and shortness of breath.    Cardiovascular: Negative for chest pain, palpitations and leg swelling.   Gastrointestinal: Negative for abdominal distention, abdominal pain, constipation, diarrhea, nausea and vomiting.   Endocrine: Negative for cold intolerance, heat intolerance, polydipsia, polyphagia and polyuria.   Genitourinary: Negative for difficulty urinating, dysuria, frequency, hematuria and urgency.   Musculoskeletal: Negative for arthralgias, back pain, gait problem, joint swelling, myalgias, neck pain and neck stiffness.   Skin: Negative for color change, pallor, rash and wound.   Allergic/Immunologic: Negative for environmental allergies and food allergies.   Neurological: Negative for dizziness, tremors, facial  "asymmetry, weakness, light-headedness, numbness and headaches.   Hematological: Negative for adenopathy. Does not bruise/bleed easily.   Psychiatric/Behavioral: Negative for agitation, behavioral problems, confusion and sleep disturbance.        Objective    /78 (BP Location: Right arm, Patient Position: Sitting, Cuff Size: Adult)  Pulse 105  Ht 167.6 cm (66\")  Wt 106 kg (233 lb)  BMI 37.61 kg/m2  Physical Exam   Constitutional: She is oriented to person, place, and time. She appears well-developed and well-nourished. No distress.   HENT:   Head: Normocephalic and atraumatic.   Right Ear: External ear normal.   Left Ear: External ear normal.   Nose: Nose normal.   Eyes: Conjunctivae and EOM are normal. Pupils are equal, round, and reactive to light.   Neck: Normal range of motion. Neck supple. No tracheal deviation present. No thyromegaly present.   Cardiovascular: Normal rate, regular rhythm and normal heart sounds.    No murmur heard.  Pulmonary/Chest: Effort normal and breath sounds normal. No respiratory distress. She has no wheezes.   Abdominal: Soft. Bowel sounds are normal. There is no tenderness. There is no rebound and no guarding.   Musculoskeletal: Normal range of motion. She exhibits no edema, tenderness or deformity.   Neurological: She is alert and oriented to person, place, and time. No cranial nerve deficit.   Skin: Skin is warm and dry. No rash noted.   Psychiatric: She has a normal mood and affect. Her behavior is normal. Judgment and thought content normal.       Lab Review  Glucose   Date Value   07/25/2016 181 mg/dl (H)   08/08/2014 102 mg/dl (H)   04/19/2014 109 mg/dL (H)     Sodium (mmol/L)   Date Value   07/25/2016 137   08/08/2014 142   04/19/2014 137     Potassium (mmol/L)   Date Value   07/25/2016 3.7   08/08/2014 3.9   04/19/2014 3.7     Chloride (mmol/L)   Date Value   07/25/2016 97   08/08/2014 105   04/19/2014 102     CO2 (mmol/L)   Date Value   07/25/2016 30   08/08/2014 26 "   04/19/2014 29     BUN   Date Value   07/25/2016 10 mg/dl   08/08/2014 8 mg/dl   04/19/2014 7 mg/dL     Creatinine   Date Value   07/25/2016 0.8 mg/dl   08/08/2014 0.9 mg/dl   04/19/2014 0.71 mg/dL     Hemoglobin A1C (%TotHgb)   Date Value   07/25/2016 6.9 (H)       Assessment/Plan      1. Controlled type 2 diabetes mellitus without complication, without long-term current use of insulin    2. Postoperative hypothyroidism    3. History of thyroid cancer    4. Essential hypertension    5. Tobacco abuse    6. Vitamin D deficiency    .    Medications prescribed:  Outpatient Encounter Prescriptions as of 12/6/2017   Medication Sig Dispense Refill   • cetirizine (zyrTEC) 10 MG tablet Take 10 mg by mouth.     • DULoxetine (CYMBALTA) 60 MG capsule Take 60 mg by mouth.     • gabapentin (NEURONTIN) 100 MG capsule TAKE 1 TAB BY MOUTH AT BEDTIME  3   • hydrochlorothiazide (HYDRODIURIL) 25 MG tablet Take 25 mg by mouth daily.     • levothyroxine (SYNTHROID, LEVOTHROID) 175 MCG tablet Take 1 tablet by mouth Daily. One tablet everyday except Sunday take 2 tablets 45 tablet 11   • meloxicam (MOBIC) 7.5 MG tablet Take 7.5 mg by mouth Daily.     • metFORMIN (GLUCOPHAGE) 1000 MG tablet      • montelukast (SINGULAIR) 10 MG tablet Take 10 mg by mouth.     • traZODone (DESYREL) 50 MG tablet Take 50 mg by mouth Every Night.     • linagliptin (TRADJENTA) 5 MG tablet tablet One tablet daily before breakfast every day 30 tablet 11   • [DISCONTINUED] acetaminophen-codeine (TYLENOL #3) 300-30 MG per tablet      • [DISCONTINUED] albuterol (PROVENTIL HFA;VENTOLIN HFA) 108 (90 BASE) MCG/ACT inhaler Inhale 180 mcg.     • [DISCONTINUED] cetirizine (ZyrTEC) 10 MG tablet Take 10 mg by mouth daily.     • [DISCONTINUED] cholecalciferol (VITAMIN D3) 53699 units capsule Take 5 capsules by mouth 1 (One) Time Per Week. Please change to 50,000 units weekly 4 capsule 11   • [DISCONTINUED] clonazePAM (KlonoPIN) 0.5 MG tablet Take 0.25-0.5 mg by mouth every  12 (twelve) hours as needed for seizures.     • [DISCONTINUED] DULoxetine (CYMBALTA) 60 MG capsule TAKE ONE CAPSULE BY MOUTH EVERY DAY  0   • [DISCONTINUED] Empagliflozin (JARDIANCE) 10 MG tablet Take 10 mg by mouth Daily. One tablet daily before breakfast 30 tablet 11   • [DISCONTINUED] lisinopril (PRINIVIL,ZESTRIL) 10 MG tablet Take 1 tablet by mouth Daily.  1   • [DISCONTINUED] lisinopril (PRINIVIL,ZESTRIL) 10 MG tablet Take 10 mg by mouth.     • [DISCONTINUED] metFORMIN (GLUCOPHAGE) 500 MG tablet Take 1 tablet by mouth 2 (Two) Times a Day With Meals. Two tablets po  tablet 11   • [DISCONTINUED] METFORMIN HCL PO Take  by mouth.     • [DISCONTINUED] montelukast (SINGULAIR) 10 MG tablet Take 10 mg by mouth.     • [DISCONTINUED] QUEtiapine (SEROquel) 25 MG tablet TAKE 1 TO 2 TABS BY MOUTH EVERY DAY AT BEDTIME  0   • [DISCONTINUED] QUEtiapine (SEROquel) 25 MG tablet Take 25 mg by mouth.     • [DISCONTINUED] SITagliptin (JANUVIA) 100 MG tablet 100 mg by mouth  daily before breakfast 30 tablet 3     No facility-administered encounter medications on file as of 12/6/2017.        Orders placed during this encounter include:  Orders Placed This Encounter   Procedures   • Thyroglobulin With Anti-TG   • Comprehensive Metabolic Panel   • Hemoglobin A1c   • TSH   • Vitamin D 25 Hydroxy   • Protein / Creatinine Ratio, Urine - Urine, Clean Catch   • Microalbumin / Creatinine Urine Ratio - Urine, Clean Catch   • Thyroglobulin With Anti-TG   • CBC & Differential     Order Specific Question:   Manual Differential     Answer:   No     Postoperative Hypothyroidism  History of Thyroid Cancer  [id number]  Stage I     according to [facility] Thyroid Ca Guidelines , postop staging - I consider intermediate risk since vascular invasion per pathology report        Tot Thyroidectomy - 4-14  HUTCHINS Ablation - 6-14     WBS June 2015 with TSH of 80 , nl physiologic uptake  tg ab neg  pending Tg     July 2016   TSH 66  Tg 0.7     Excellent response       Target TSH according to [facility] Recommendation #70 is 0.5 to 2        4-18-14 : Tot Thyroidectomy     6-24-14 : HUTCHINS Ablation with TSH of 131                     May 2017     TSH - 18.19      Taking 175 mcg one tablet Monday and Friday and Two Saturday and Sunday Nov. 2017    TSH - 2.42     Missed some doses  Target should be 2    Be compliant with medication          Need neck ultrasound --schedule here and have an appt after the ultrasound      Repeat thyroid u/s July 2017     No thyroid tissue remnants    Repeat July 2018      --     Diabetes           Oct. 2016      Hgb A1c - 8.6        metformin 500 mg daily -- increase to one bid for one week then increase to two po bid --just now increased to 1000 mg po BID      invokana 100mg daily --- stopped      Feb. 2017     HgbA1c - 8.7%      Keep metformin      Start Januvia 100 mg one breakfast      Discussed victoza she does not want to inject      May 2017     HgbA1c 7.4%         November 2017    HgbA1c 7.8%     Missing medication     Metformin 1000 mg BID     Not taking Januvia -- change to Tradjenta 5 mg daily         --     vit d Def - increase to 50 th u weekly       May 2017     Vitamin d - 17      Stopped medicine - restart    ----------------------     Tobacco Abuse      Smoking cessation discussed          4. Follow-up: Return in about 3 months (around 3/6/2018) for Recheck.

## 2017-12-07 LAB
THYROGLOB AB SERPL-ACNC: <1 IU/ML (ref 0–0.9)
THYROGLOBULIN BY IMA: 0.7 NG/ML (ref 1.5–38.5)

## 2017-12-11 ENCOUNTER — TELEPHONE (OUTPATIENT)
Dept: ENDOCRINOLOGY | Facility: CLINIC | Age: 37
End: 2017-12-11

## 2017-12-11 NOTE — TELEPHONE ENCOUNTER
----- Message from AIDEN Estrada sent at 12/8/2017  8:18 AM CST -----  Call patient with result--let her know cancer marker is not detectable

## 2018-03-07 ENCOUNTER — APPOINTMENT (OUTPATIENT)
Dept: LAB | Facility: HOSPITAL | Age: 38
End: 2018-03-07

## 2018-03-07 ENCOUNTER — OFFICE VISIT (OUTPATIENT)
Dept: ENDOCRINOLOGY | Facility: CLINIC | Age: 38
End: 2018-03-07

## 2018-03-07 VITALS
DIASTOLIC BLOOD PRESSURE: 88 MMHG | WEIGHT: 226 LBS | BODY MASS INDEX: 36.32 KG/M2 | SYSTOLIC BLOOD PRESSURE: 124 MMHG | HEIGHT: 66 IN | HEART RATE: 90 BPM

## 2018-03-07 DIAGNOSIS — E89.0 POSTOPERATIVE HYPOTHYROIDISM: Primary | ICD-10-CM

## 2018-03-07 DIAGNOSIS — Z85.850 HISTORY OF THYROID CANCER: ICD-10-CM

## 2018-03-07 DIAGNOSIS — E55.9 VITAMIN D DEFICIENCY: ICD-10-CM

## 2018-03-07 DIAGNOSIS — I10 ESSENTIAL HYPERTENSION: ICD-10-CM

## 2018-03-07 DIAGNOSIS — R74.8 ELEVATED LIVER ENZYMES: ICD-10-CM

## 2018-03-07 DIAGNOSIS — E11.9 CONTROLLED TYPE 2 DIABETES MELLITUS WITHOUT COMPLICATION, WITHOUT LONG-TERM CURRENT USE OF INSULIN (HCC): ICD-10-CM

## 2018-03-07 LAB
ALBUMIN SERPL-MCNC: 3.8 G/DL (ref 3.4–4.8)
ALBUMIN/GLOB SERPL: 1.1 G/DL (ref 1.1–1.8)
ALP SERPL-CCNC: 142 U/L (ref 38–126)
ALT SERPL W P-5'-P-CCNC: 146 U/L (ref 9–52)
ANION GAP SERPL CALCULATED.3IONS-SCNC: 12 MMOL/L (ref 5–15)
AST SERPL-CCNC: 138 U/L (ref 14–36)
BILIRUB SERPL-MCNC: 0.5 MG/DL (ref 0.2–1.3)
BUN BLD-MCNC: 6 MG/DL (ref 7–21)
BUN/CREAT SERPL: 9 (ref 7–25)
CALCIUM SPEC-SCNC: 9.1 MG/DL (ref 8.4–10.2)
CHLORIDE SERPL-SCNC: 100 MMOL/L (ref 95–110)
CO2 SERPL-SCNC: 25 MMOL/L (ref 22–31)
CREAT BLD-MCNC: 0.67 MG/DL (ref 0.5–1)
GFR SERPL CREATININE-BSD FRML MDRD: 98 ML/MIN/1.73 (ref 60–149)
GLOBULIN UR ELPH-MCNC: 3.5 GM/DL (ref 2.3–3.5)
GLUCOSE BLD-MCNC: 227 MG/DL (ref 60–100)
HAV IGM SERPL QL IA: NEGATIVE
HBV CORE IGM SERPL QL IA: NEGATIVE
HBV SURFACE AG SERPL QL IA: NEGATIVE
HCV AB SER DONR QL: NEGATIVE
POTASSIUM BLD-SCNC: 3.2 MMOL/L (ref 3.5–5.1)
PROT SERPL-MCNC: 7.3 G/DL (ref 6.3–8.6)
SODIUM BLD-SCNC: 137 MMOL/L (ref 137–145)

## 2018-03-07 PROCEDURE — 36415 COLL VENOUS BLD VENIPUNCTURE: CPT | Performed by: NURSE PRACTITIONER

## 2018-03-07 PROCEDURE — 99214 OFFICE O/P EST MOD 30 MIN: CPT | Performed by: NURSE PRACTITIONER

## 2018-03-07 PROCEDURE — 80053 COMPREHEN METABOLIC PANEL: CPT | Performed by: NURSE PRACTITIONER

## 2018-03-07 PROCEDURE — 80074 ACUTE HEPATITIS PANEL: CPT | Performed by: NURSE PRACTITIONER

## 2018-03-07 RX ORDER — ALOGLIPTIN 12.5 MG/1
12.5 TABLET, FILM COATED ORAL DAILY
Qty: 30 TABLET | Refills: 6 | Status: SHIPPED | OUTPATIENT
Start: 2018-03-07 | End: 2018-10-15 | Stop reason: SDUPTHER

## 2018-03-07 NOTE — PROGRESS NOTES
Subjective    Trina Fonseca is a 37 y.o. female. she is here today for follow-up.    History of Present Illness         37 y o comes for followup      Patient was dealing with depression and stopped all medications for one month -- now restarted      reason post surgical hypothyroidism , history of thyroid cancer     ======================================     CONTEXT     Patient initially had enlarging right thryoid nodule  Workup led to [facility] dx Thryoid cancer     ======================================     DURATION AND ALLEVIATING FACTORS      4-18-14 : Tot Thyroidectomy     6-24-14 : HUTCHINS Ablation with TSH of 131      on synthroid  dose is 200mcgs daily , on 175 TSH was 7   dose was increased back to 200 but TSH suppression was below target     she was advised to 6.5 tabs per week but understood only half a tab daily and TSH now 66. With this TSH her Tg is only 0.7      ======================================     QUALITY - Thyroid Cancer well controlled.   Hypothryoidism Not Controlled      June 26, 2014      intense activity in the thyroid bed     June 2014     Tg 5     Aug 2014  TSH 7 , Tg undetectable      June 2015      TSH 80  Tg ab neg  Tg never done?     July 2016  TSH 66  Tg 0.7      ======================================  LOCATION - SIZE      Pathology Report 4-18-14     Right Lobe  a. Follicular Ca with invasion into but not through capsule with one focus of vascular invasion but confined to the thryoid  Size 6.1 cm      b. 3.5 mm Papillary Thyroid Ca     Left Lobe     a. 2 cm Infarcted nodule - no dx of ca made  b. 3 mm micropapillary thyroid Ca      THIS REPORT WAS SENT FOR SCANNING     ======================================  SEVERITY      Has to be considered intermediate due to vascular invasion as reported above  Size of 6 cm can be considered an intermediate risk factor, at least severe enough to have justified HUTCHINS ablation      ======================================  SYMPTOMS     Fatigue, Cold  Intolerance, Weight Gain , Depression , Anxiety   have improved   ======================================     TIMING      Cured  June 2015 WBS with TSH of 80 , only physiologic uptake  Tg pending               Evaluation history:     HISTORY- Prior history of follicular and papillary thyroid cancer.  Prior iodine-131 ablation June 2014.           Following the oral administration of 5.0 mCi of iodine-131 whole body  imaging was obtained. Images obtained both 24 hours and 48 hours  following ingestion.        Findings, conclusion-  There is physiological uptake in the salivary glands and nasopharynx.  There is physiological uptake in bladder and bowel.   Whole-body imaging study is otherwise unremarkable. No findings  suggesting recurrent or metastatic disease.  Electronically Signed By- Lion Duggan MD On: 2015-06-12 15:25:17         The following portions of the patient's history were reviewed and updated as appropriate:   Past Medical History:   Diagnosis Date   • Anxiety state    • Endogenous obesity    • History of malignant neoplasm of thyroid    • Postoperative hypothyroidism    • Type 2 diabetes mellitus    • Vitamin D deficiency      Past Surgical History:   Procedure Laterality Date   • THYROIDECTOMY       Family History   Problem Relation Age of Onset   • Hypertension Other    • Thyroid disease Other      OB History     No data available        Current Outpatient Prescriptions   Medication Sig Dispense Refill   • Alogliptin Benzoate (NESINA) 12.5 MG tablet Take 12.5 mg by mouth Daily. 30 tablet 6   • cetirizine (zyrTEC) 10 MG tablet Take 10 mg by mouth.     • DULoxetine (CYMBALTA) 60 MG capsule Take 60 mg by mouth.     • gabapentin (NEURONTIN) 100 MG capsule TAKE 1 TAB BY MOUTH AT BEDTIME  3   • hydrochlorothiazide (HYDRODIURIL) 25 MG tablet Take 25 mg by mouth daily.     • levothyroxine (SYNTHROID, LEVOTHROID) 175 MCG tablet Take 1 tablet by mouth Daily. One tablet everyday except Sunday take 2 tablets 45  tablet 11   • linagliptin (TRADJENTA) 5 MG tablet tablet One tablet daily before breakfast every day 30 tablet 11   • meloxicam (MOBIC) 7.5 MG tablet Take 7.5 mg by mouth Daily.     • metFORMIN (GLUCOPHAGE) 1000 MG tablet Take 1 tablet by mouth 2 (Two) Times a Day With Meals. 60 tablet 11   • montelukast (SINGULAIR) 10 MG tablet Take 10 mg by mouth.     • traZODone (DESYREL) 50 MG tablet Take 50 mg by mouth Every Night.       No current facility-administered medications for this visit.      No Known Allergies  Social History     Social History   • Marital status:      Social History Main Topics   • Smoking status: Current Every Day Smoker   • Smokeless tobacco: Never Used      Comment: smoking cessation encouraged    • Alcohol use No       Review of Systems  Review of Systems   Constitutional: Negative for activity change, appetite change, diaphoresis and fatigue.   HENT: Negative for facial swelling, sneezing, sore throat, tinnitus, trouble swallowing and voice change.    Eyes: Negative for photophobia, pain, discharge, redness, itching and visual disturbance.   Respiratory: Negative for apnea, cough, choking, chest tightness and shortness of breath.    Cardiovascular: Negative for chest pain, palpitations and leg swelling.   Gastrointestinal: Negative for abdominal distention, abdominal pain, constipation, diarrhea, nausea and vomiting.   Endocrine: Negative for cold intolerance, heat intolerance, polydipsia, polyphagia and polyuria.   Genitourinary: Negative for difficulty urinating, dysuria, frequency, hematuria and urgency.   Musculoskeletal: Negative for arthralgias, back pain, gait problem, joint swelling, myalgias, neck pain and neck stiffness.   Skin: Negative for color change, pallor, rash and wound.   Neurological: Negative for dizziness, tremors, weakness, light-headedness, numbness and headaches.   Hematological: Negative for adenopathy. Does not bruise/bleed easily.   Psychiatric/Behavioral:  "Negative for behavioral problems, confusion and sleep disturbance.        Objective    /88 (BP Location: Right arm, Patient Position: Sitting, Cuff Size: Adult)  Pulse 90  Ht 167.6 cm (66\")  Wt 103 kg (226 lb)  BMI 36.48 kg/m2  Physical Exam   Constitutional: She is oriented to person, place, and time. She appears well-developed and well-nourished. No distress.   HENT:   Head: Normocephalic and atraumatic.   Right Ear: External ear normal.   Left Ear: External ear normal.   Nose: Nose normal.   Eyes: Conjunctivae and EOM are normal. Pupils are equal, round, and reactive to light.   Neck: Normal range of motion. Neck supple. No tracheal deviation present. No thyromegaly present.   Cardiovascular: Normal rate, regular rhythm and normal heart sounds.    No murmur heard.  Pulmonary/Chest: Effort normal and breath sounds normal. No respiratory distress. She has no wheezes.   Abdominal: Soft. Bowel sounds are normal. There is no tenderness. There is no rebound and no guarding.   Musculoskeletal: Normal range of motion. She exhibits no edema, tenderness or deformity.   Neurological: She is alert and oriented to person, place, and time. No cranial nerve deficit.   Skin: Skin is warm and dry. No rash noted.   Psychiatric: She has a normal mood and affect. Her behavior is normal. Judgment and thought content normal.       Lab Review  Glucose   Date Value   07/25/2016 181 mg/dl (H)   08/08/2014 102 mg/dl (H)   04/19/2014 109 mg/dL (H)     Sodium (mmol/L)   Date Value   07/25/2016 137   08/08/2014 142   04/19/2014 137     Potassium (mmol/L)   Date Value   07/25/2016 3.7   08/08/2014 3.9   04/19/2014 3.7     Chloride (mmol/L)   Date Value   07/25/2016 97   08/08/2014 105   04/19/2014 102     CO2 (mmol/L)   Date Value   07/25/2016 30   08/08/2014 26   04/19/2014 29     BUN   Date Value   07/25/2016 10 mg/dl   08/08/2014 8 mg/dl   04/19/2014 7 mg/dL     Creatinine   Date Value   07/25/2016 0.8 mg/dl   08/08/2014 0.9 " mg/dl   04/19/2014 0.71 mg/dL     Hemoglobin A1C (%TotHgb)   Date Value   07/25/2016 6.9 (H)       Assessment/Plan      1. Postoperative hypothyroidism    2. History of thyroid cancer    3. Controlled type 2 diabetes mellitus without complication, without long-term current use of insulin    4. Essential hypertension    5. Elevated liver enzymes    .    Medications prescribed:  Outpatient Encounter Prescriptions as of 3/7/2018   Medication Sig Dispense Refill   • Alogliptin Benzoate (NESINA) 12.5 MG tablet Take 12.5 mg by mouth Daily. 30 tablet 6   • cetirizine (zyrTEC) 10 MG tablet Take 10 mg by mouth.     • DULoxetine (CYMBALTA) 60 MG capsule Take 60 mg by mouth.     • gabapentin (NEURONTIN) 100 MG capsule TAKE 1 TAB BY MOUTH AT BEDTIME  3   • hydrochlorothiazide (HYDRODIURIL) 25 MG tablet Take 25 mg by mouth daily.     • levothyroxine (SYNTHROID, LEVOTHROID) 175 MCG tablet Take 1 tablet by mouth Daily. One tablet everyday except Sunday take 2 tablets 45 tablet 11   • linagliptin (TRADJENTA) 5 MG tablet tablet One tablet daily before breakfast every day 30 tablet 11   • meloxicam (MOBIC) 7.5 MG tablet Take 7.5 mg by mouth Daily.     • metFORMIN (GLUCOPHAGE) 1000 MG tablet Take 1 tablet by mouth 2 (Two) Times a Day With Meals. 60 tablet 11   • montelukast (SINGULAIR) 10 MG tablet Take 10 mg by mouth.     • traZODone (DESYREL) 50 MG tablet Take 50 mg by mouth Every Night.     • [DISCONTINUED] metFORMIN (GLUCOPHAGE) 1000 MG tablet        No facility-administered encounter medications on file as of 3/7/2018.        Orders placed during this encounter include:  Orders Placed This Encounter   Procedures   • Hepatitis Panel, Acute   • Comprehensive Metabolic Panel   • Comprehensive Metabolic Panel   • TSH   • Vitamin D 25 Hydroxy   • Hemoglobin A1c   • Thyroglobulin With Anti-TG   • CBC & Differential     Order Specific Question:   Manual Differential     Answer:   No     Postoperative Hypothyroidism  History of Thyroid  Cancer  [id number]  Stage I     according to [facility] Thyroid Ca Guidelines , postop staging - I consider intermediate risk since vascular invasion per pathology report        Tot Thyroidectomy - 4-14  HUTCHINS Ablation - 6-14     WBS June 2015 with TSH of 80 , nl physiologic uptake  tg ab neg  pending Tg     July 2016   TSH 66  Tg 0.7     Excellent response      Target TSH according to [facility] Recommendation #70 is 0.5 to 2        4-18-14 : Tot Thyroidectomy     6-24-14 : HUTCHINS Ablation with TSH of 131      May 2017     TSH - 18.19      Taking 175 mcg one tablet Monday and Friday and Two Saturday and Sunday Nov. 2017     TSH - 2.42      Missed some doses  Target should be 2     Be compliant with medication     March 2018     TSH - 7.61     Was stopped by primary -- restarted one month ago     Tg Ab <1.0                Need neck ultrasound --schedule here and have an appt after the ultrasound      Repeat thyroid u/s July 2017     No thyroid tissue remnants     Repeat July 2018      --     Diabetes              Feb. 2017     HgbA1c - 8.7%      Keep metformin      Start Januvia 100 mg one breakfast      Discussed victoza she does not want to inject                 November 2017     HgbA1c 7.8%      Missing medication      Metformin 1000 mg BID -- stopped        Not taking Januvia -- change to Tradjenta 5 mg daily --- not covered     March 2018     HgbA1c 8.2%     Restart Metformin 1000 mg po BID -- start with 1/2 tablet once a day for a week then 1/2 tablet po BID -- then one tablet po BID     Januvia 100 mg one daily        --     vit d Def - increase to 50 th u weekly       May 2017     Vitamin d - 17      Stopped medicine - restart    ----------------------     Tobacco Abuse       Liver enzymes       AST - 157  ALT - 148     Repeat today and add hepatitis       4. Follow-up: Return in about 3 months (around 6/7/2018) for Recheck.

## 2018-03-08 ENCOUNTER — TELEPHONE (OUTPATIENT)
Dept: ENDOCRINOLOGY | Facility: CLINIC | Age: 38
End: 2018-03-08

## 2018-03-08 DIAGNOSIS — R74.8 ELEVATED LIVER ENZYMES: Primary | ICD-10-CM

## 2018-03-08 NOTE — TELEPHONE ENCOUNTER
----- Message from AIDEN Estrada sent at 3/8/2018  8:02 AM CST -----  Hepatitis panel was negative; liver enzymes still elevated -- refer to GI for elevated liver enzymes

## 2018-03-09 RX ORDER — LEVOTHYROXINE SODIUM 175 UG/1
TABLET ORAL
Qty: 45 TABLET | Refills: 0 | Status: SHIPPED | OUTPATIENT
Start: 2018-03-09 | End: 2018-10-16 | Stop reason: SDUPTHER

## 2018-05-10 ENCOUNTER — OFFICE VISIT (OUTPATIENT)
Dept: GASTROENTEROLOGY | Facility: CLINIC | Age: 38
End: 2018-05-10

## 2018-05-10 VITALS
WEIGHT: 216.2 LBS | HEIGHT: 66 IN | SYSTOLIC BLOOD PRESSURE: 108 MMHG | DIASTOLIC BLOOD PRESSURE: 76 MMHG | BODY MASS INDEX: 34.75 KG/M2 | HEART RATE: 112 BPM

## 2018-05-10 DIAGNOSIS — R74.8 ELEVATED LIVER ENZYMES: Primary | ICD-10-CM

## 2018-05-10 DIAGNOSIS — K21.9 GASTROESOPHAGEAL REFLUX DISEASE, ESOPHAGITIS PRESENCE NOT SPECIFIED: ICD-10-CM

## 2018-05-10 DIAGNOSIS — R10.10 PAIN OF UPPER ABDOMEN: ICD-10-CM

## 2018-05-10 PROCEDURE — 99213 OFFICE O/P EST LOW 20 MIN: CPT | Performed by: PHYSICIAN ASSISTANT

## 2018-05-10 RX ORDER — GABAPENTIN 100 MG/1
200 CAPSULE ORAL 3 TIMES DAILY
COMMUNITY

## 2018-05-10 RX ORDER — LISINOPRIL 10 MG/1
10 TABLET ORAL DAILY
COMMUNITY

## 2018-05-10 RX ORDER — PRAZOSIN HYDROCHLORIDE 1 MG/1
1 CAPSULE ORAL NIGHTLY
COMMUNITY

## 2018-05-10 RX ORDER — BUSPIRONE HYDROCHLORIDE 5 MG/1
TABLET ORAL
COMMUNITY

## 2018-05-10 NOTE — PROGRESS NOTES
Chief Complaint   Patient presents with   • Elevated Hepatic Enzymes     Ref. Siddhartha WOLFE       ENDO PROCEDURE ORDERED:    Dev Fonseca is a 37 y.o. female. she is being seen for consultation today at the request of Siddhartha Cole, *    History of Present Illness    This 37-year-old unemployed female was sent for evaluation for elevated liver enzymes by AIDEN Kraft, who saw the patient with a history of hypothyroidism, thyroid cancer on 03/07/2018. Patient had laboratories on that date. CMP showed glucose 228, potassium 3.2, alkaline phosphatase 142, , , otherwise normal. Hepatitis diagnostic panel was negative. Patient has had a mild elevation in her transaminases in the past. She had previous CT imaging of the abdomen and pelvis at [facility] last done on 05/24/2016 showed a fatty liver. Recent laboratories done on 03/03/218 showed TSH 7.61, normal PPO, A1c 8.2%. CBC was okay. CMP showed glucose 230, calcium 8.4, albumin 3.1, alkaline phosphatase 137, , , otherwise normal.     Patient states she has been told in the past that her liver enzymes were elevated, but she has never really had a complete evaluation. She denies any flu-like symptoms. She has had recent tic bite but denied any rash. She thinks that she removed the tic within an hour of being exposed. Patient currently denies abdominal pain. She gets heartburn if she eats something spicy. She denies nausea, vomiting, dysphagia, bowel movements are regular without blood or mucus. Weight is stable. Appetite normal. She has never had endoscopy.     Patient is a half to pack per day smoker, strongly encouraged to quit. She denies alcohol, illicit substance use. She has a history of diabetes, thyroid cancer, asthma, gallstones, panic and anxiety disorders, PTSD, and severe depression. She has had a cholecystectomy, tonsillectomy, myringotomy tubes, thyroidectomy. Family history of  heart disease, diabetes, grandfather had pancreatic cancer, stroke, gallstones, hypertension, kidney disease, allergies. No known family history of liver disease. Father and mother in fair health at ages 69 and 58.  She was previously  13 years, 1 sister in good health, she lists no children.      ASSESSMENT/PLAN: Patient with moderate elevation in liver enzymes with suspected fatty liver as the cause, she has poorly controlled diabetes, previous imaging showed fatty liver. Her only new medications are lisinopril and Depo. I did recommend further studies to include hepatitis diagnostic panel, DARWIN, AMA, SMA, ceruloplasmin, alpha 1 antitrypsin, AFP, GRULLON FibroSure, INR. Also recommend right upper quadrant ultrasound to review her liver. Will see her in followup after the above, further pending clinical course and the results of the above.     Thank you very much, Siddhartha, for this consultation, and for allowing us to participate in the care of your patient. Will keep you informed.         The following portions of the patient's history were reviewed and updated as appropriate:   Past Medical History:   Diagnosis Date   • Anxiety state    • Cancer     THYROID   • Endogenous obesity    • History of malignant neoplasm of thyroid    • Postoperative hypothyroidism    • Type 2 diabetes mellitus    • Vitamin D deficiency      Past Surgical History:   Procedure Laterality Date   • CHOLECYSTECTOMY     • THYROIDECTOMY     • TONSILLECTOMY       Family History   Problem Relation Age of Onset   • Hypertension Other    • Thyroid disease Other    • Diabetes Other    • Heart disease Other    • Stroke Other    • Breast cancer Other    • Cholelithiasis Other    • Kidney disease Other    • Pancreatic cancer Other    • Prostate cancer Father      OB History     No data available        No Known Allergies  Social History     Social History   • Marital status:      Social History Main Topics   • Smoking status: Current Every  "Day Smoker   • Smokeless tobacco: Never Used      Comment: smoking cessation encouraged    • Alcohol use No   • Drug use: No     Other Topics Concern   • Not on file       Current Outpatient Prescriptions:   •  Alogliptin Benzoate (NESINA) 12.5 MG tablet, Take 12.5 mg by mouth Daily., Disp: 30 tablet, Rfl: 6  •  busPIRone (BUSPAR) 5 MG tablet, Take  by mouth. TAKE 1-2 TABLETS BY MOUTH TID, Disp: , Rfl:   •  cetirizine (zyrTEC) 10 MG tablet, Take 10 mg by mouth Daily., Disp: , Rfl:   •  DULoxetine (CYMBALTA) 60 MG capsule, Take 60 mg by mouth Daily., Disp: , Rfl:   •  gabapentin (NEURONTIN) 100 MG capsule, Take 200 mg by mouth 3 (Three) Times a Day., Disp: , Rfl:   •  hydrochlorothiazide (HYDRODIURIL) 25 MG tablet, Take 25 mg by mouth daily., Disp: , Rfl:   •  levothyroxine (SYNTHROID, LEVOTHROID) 175 MCG tablet, TAKE 1 TABLET MY MOUTH DAILY EXCEPT SUNDAY TAKE 2 TABLETS, Disp: 45 tablet, Rfl: 0  •  lisinopril (PRINIVIL,ZESTRIL) 10 MG tablet, Take 10 mg by mouth Daily., Disp: , Rfl:   •  metFORMIN (GLUCOPHAGE) 1000 MG tablet, Take 1 tablet by mouth 2 (Two) Times a Day With Meals., Disp: 60 tablet, Rfl: 11  •  montelukast (SINGULAIR) 10 MG tablet, Take 10 mg by mouth., Disp: , Rfl:   •  prazosin (MINIPRESS) 1 MG capsule, Take 1 mg by mouth Every Night., Disp: , Rfl:   •  traZODone (DESYREL) 50 MG tablet, Take 50 mg by mouth Every Night., Disp: , Rfl:   Review of Systems  Review of Systems   Constitutional: Negative for unexpected weight change.   HENT: Negative for trouble swallowing.    Gastrointestinal: Positive for nausea. Negative for abdominal distention, abdominal pain, anal bleeding, blood in stool, constipation, diarrhea, rectal pain and vomiting.   Genitourinary: Negative for difficulty urinating.   All other systems reviewed and are negative.         Objective    /76 (BP Location: Left arm)   Pulse 112   Ht 167.6 cm (66\")   Wt 98.1 kg (216 lb 3.2 oz)   LMP 05/10/2018   BMI 34.90 kg/m²   Physical " Exam   Constitutional: She is oriented to person, place, and time. She appears well-developed and well-nourished. No distress.   HENT:   Head: Normocephalic and atraumatic.   Eyes: EOM are normal. Pupils are equal, round, and reactive to light.   Neck: Normal range of motion.   Cardiovascular: Normal rate, regular rhythm and normal heart sounds.    Pulmonary/Chest: Effort normal and breath sounds normal.   Abdominal: Soft. Bowel sounds are normal. She exhibits no shifting dullness, no distension, no abdominal bruit, no ascites and no mass. There is no hepatosplenomegaly. There is tenderness. There is no rigidity, no rebound, no guarding and no CVA tenderness. No hernia. Hernia confirmed negative in the ventral area.   obese   Musculoskeletal: Normal range of motion.   Neurological: She is alert and oriented to person, place, and time.   Skin: Skin is warm and dry.   Psychiatric: She has a normal mood and affect. Her behavior is normal. Judgment and thought content normal.   Nursing note and vitals reviewed.    Assessment/Plan      1. Elevated liver enzymes    2. Gastroesophageal reflux disease, esophagitis presence not specified    3. Pain of upper abdomen    .   Trina was seen today for elevated hepatic enzymes.    Diagnoses and all orders for this visit:    Elevated liver enzymes  -     Iron and TIBC  -     Ferritin  -     AFP Tumor Marker  -     Alpha - 1 - Antitrypsin  -     Anti-Smooth Muscle Antibody Titer  -     Ceruloplasmin  -     Protime-INR  -     Nuclear Antigen Antibody, IFA  -     GRULLON Fibrosure  -     Mitochondrial Antibodies, M2  -     Hepatitis Panel, Acute  -     US Abdomen Limited    Gastroesophageal reflux disease, esophagitis presence not specified  -     Iron and TIBC  -     Ferritin  -     AFP Tumor Marker  -     Alpha - 1 - Antitrypsin  -     Anti-Smooth Muscle Antibody Titer  -     Ceruloplasmin  -     Protime-INR  -     Nuclear Antigen Antibody, IFA  -     GRULLON Fibrosure  -      Mitochondrial Antibodies, M2  -     Hepatitis Panel, Acute  -     US Abdomen Limited    Pain of upper abdomen        Orders placed during this encounter include:  Orders Placed This Encounter   Procedures   • US Abdomen Limited     Scheduling Instructions:      RUQ     Order Specific Question:   Reason for Exam:     Answer:   elevated liver enzymes, Fatty liver   • Iron and TIBC   • Ferritin   • AFP Tumor Marker   • Alpha - 1 - Antitrypsin   • Anti-Smooth Muscle Antibody Titer   • Ceruloplasmin   • Protime-INR   • Nuclear Antigen Antibody, IFA   • GRULLON Fibrosure   • Mitochondrial Antibodies, M2   • Hepatitis Panel, Acute       Medications prescribed:  No orders of the defined types were placed in this encounter.    Discontinued Medications       Reason for Discontinue    gabapentin (NEURONTIN) 100 MG capsule Dose adjustment    linagliptin (TRADJENTA) 5 MG tablet tablet Discontinued by another clinician    meloxicam (MOBIC) 7.5 MG tablet Discontinued by another clinician        Requested Prescriptions      No prescriptions requested or ordered in this encounter       Review and/or summary of lab tests, radiology, procedures, medications. Review and summary of old records and obtaining of history. The risks and benefits of my recommendations, as well as other treatment options were discussed with the patient today. Questions were answered.    Follow-up: Return if symptoms worsen or fail to improve, for After the above.     * Surgery not found *      This document has been electronically signed by Joseph Rivera PA-C on May 14, 2018 1:48 PM      Results for orders placed or performed in visit on 03/07/18   Hepatitis Panel, Acute   Result Value Ref Range    Hepatitis C Ab Negative Negative    Hep A IgM Negative Negative    Hep B C IgM Negative Negative    Hepatitis B Surface Ag Negative Negative   Comprehensive Metabolic Panel   Result Value Ref Range    Glucose 227 (H) 60 - 100 mg/dL    BUN 6 (L) 7 - 21 mg/dL     Creatinine 0.67 0.50 - 1.00 mg/dL    Sodium 137 137 - 145 mmol/L    Potassium 3.2 (L) 3.5 - 5.1 mmol/L    Chloride 100 95 - 110 mmol/L    CO2 25.0 22.0 - 31.0 mmol/L    Calcium 9.1 8.4 - 10.2 mg/dL    Total Protein 7.3 6.3 - 8.6 g/dL    Albumin 3.80 3.40 - 4.80 g/dL    ALT (SGPT) 146 (H) 9 - 52 U/L    AST (SGOT) 138 (H) 14 - 36 U/L    Alkaline Phosphatase 142 (H) 38 - 126 U/L    Total Bilirubin 0.5 0.2 - 1.3 mg/dL    eGFR Non African Amer 98 >60 mL/min/1.73    Globulin 3.5 2.3 - 3.5 gm/dL    A/G Ratio 1.1 1.1 - 1.8 g/dL    BUN/Creatinine Ratio 9.0 7.0 - 25.0    Anion Gap 12.0 5.0 - 15.0 mmol/L   Results for orders placed or performed in visit on 12/06/17   Thyroglobulin By LYDIA   Result Value Ref Range    THYROGLOBULIN BY LYDIA 0.7 (L) 1.5 - 38.5 ng/mL   Thyroglobulin With Anti-TG   Result Value Ref Range    Thyroglobulin Ab <1.0 0.0 - 0.9 IU/mL   Results for orders placed or performed during the hospital encounter of 07/25/16   Thyroglobulin with Anti-TG   Result Value Ref Range    Thyroglobulin Ab <1.0 0.0 - 0.9 IU/mL    THYROGLOBULIN BY LYDIA 3.4 1.5 - 38.5 ng/mL   Results for orders placed or performed during the hospital encounter of 07/25/16   Magnesium   Result Value Ref Range    Magnesium 2.16 1.60 - 2.30 mg/dl   Results for orders placed or performed during the hospital encounter of 07/25/16   CBC and Differential   Result Value Ref Range    WBC 8.6 3.2 - 9.8 x1000/uL    RBC 4.30 3.77 - 5.16 flores/mm3    Hemoglobin 12.3 12.0 - 15.5 gm/dl    Hematocrit 36.0 35.0 - 45.0 %    MCV 83.7 80.0 - 98.0 fl    MCH 28.6 26.0 - 34.0 pg    MCHC 34.2 31.4 - 36.0 gm/dl    RDW 15.6 (H) 11.5 - 14.5 %    Platelets 289 150 - 450 x1000/mm3    MPV 10.9 8.0 - 12.0 fl    Neutrophil Rel % 58.4 37.0 - 80.0 %    Lymphocyte Rel % 32.3 10.0 - 50.0 %    Monocyte Rel % 5.2 0.0 - 12.0 %    Eosinophil Rel % 3.5 0.0 - 7.0 %    Basophil Rel % 0.3 0.0 - 2.0 %    Immature Granulocyte Rel % 0.30 0.00 - 0.50 %    Neutrophils Absolute 5.03 2.00 - 8.60  x1000/uL    Lymphocytes Absolute 2.79 0.60 - 4.20 x1000/uL    Monocytes Absolute 0.45 0.00 - 0.90 x1000/uL    Eosinophils Absolute 0.30 0.00 - 0.70 x1000/uL    Basophils Absolute 0.03 0.00 - 0.20 x1000/uL    Immature Granulocytes Absolute 0.030 (H) 0.005 - 0.022 x1000/uL   TSH   Result Value Ref Range    TSH 66.00 (H) 0.46 - 4.68 uIU/ml   Hemoglobin A1c   Result Value Ref Range    Hemoglobin A1C 6.9 (H) 4.0 - 5.6 %TotHgb   Comprehensive metabolic panel   Result Value Ref Range    Sodium 137 137 - 145 mmol/L    Potassium 3.7 3.5 - 5.1 mmol/L    Chloride 97 95 - 110 mmol/L    CO2 30 22 - 31 mmol/L    Anion Gap 10.0 5.0 - 15.0 mmol/L    Glucose 181 (H) 60 - 100 mg/dl    BUN 10 7 - 21 mg/dl    Creatinine 0.8 0.5 - 1.0 mg/dl    GFR MDRD Non  80 64 - 149 mL/min/1.73 sq.M    GFR MDRD  97 64 - 149 mL/min/1.73 sq.M    Calcium 8.9 8.4 - 10.2 mg/dl    Total Protein 7.0 6.3 - 8.6 gm/dl    Albumin 3.4 3.4 - 4.8 gm/dl    Total Bilirubin 0.2 0.2 - 1.3 mg/dl    Alkaline Phosphatase 80 38 - 126 U/L    AST (SGOT) 58 (H) 14 - 36 U/L    ALT (SGPT) 79 (H) 9 - 52 U/L   Results for orders placed or performed in visit on 06/10/15   hCG, serum, qualitative   Result Value Ref Range    HCG Qualitative Negative    Results for orders placed or performed in visit on 08/08/14   Thyroglobulin with Anti-TG   Result Value Ref Range    Thyroglobulin Ab <1.0 0.0 - 0.9 IU/mL    Thyroglobulin 1.1 (L) 1.5 - 38.5 ng/mL   Hemoglobin and hematocrit, blood   Result Value Ref Range    Hemoglobin 12.4 12.0 - 15.5 gm/dl    Hematocrit 36.4 35.0 - 45.0 %   Anti-thyroglobulin antibody   Result Value Ref Range    Thyroglobulin Ab <1.0 0.0 - 0.9 IU/mL   TSH   Result Value Ref Range    TSH 7.26 (H) 0.46 - 4.68 uIU/ml   T4, free   Result Value Ref Range    Free T4 1.69 0.78 - 2.19 ng/dl   Comprehensive metabolic panel   Result Value Ref Range    Sodium 142 137 - 145 mmol/L    Potassium 3.9 3.5 - 5.1 mmol/L    Chloride 105 95 - 110  mmol/L    CO2 26 22 - 31 mmol/L    Anion Gap 11.0 5.0 - 15.0 mmol/L    Glucose 102 (H) 60 - 100 mg/dl    BUN 8 7 - 21 mg/dl    Creatinine 0.9 0.5 - 1.0 mg/dl    GFR MDRD Non  71 64 - 149 mL/min/1.73 sq.M    GFR MDRD  86 64 - 149 mL/min/1.73 sq.M    Calcium 9.0 8.4 - 10.2 mg/dl    Total Protein 7.4 6.3 - 8.6 gm/dl    Albumin 3.9 3.4 - 4.8 gm/dl    Total Bilirubin 0.6 0.2 - 1.3 mg/dl    Alkaline Phosphatase 86 38 - 126 U/L    AST (SGOT) 23 14 - 36 U/L    ALT (SGPT) 25 9 - 52 U/L   Results for orders placed or performed during the hospital encounter of 06/24/14   hCG, serum, qualitative   Result Value Ref Range    HCG Qualitative Negative Negative mIU/mL   Results for orders placed or performed during the hospital encounter of 04/18/14   APTT   Result Value Ref Range    PTT 28.8 24.1 - 34.8 Seconds   Protime-INR   Result Value Ref Range    Protime 13.2 11.9 - 14.6 Seconds    INR 0.97 0.91 - 1.09   CBC and Differential   Result Value Ref Range    WBC 11.46 (H) 4.80 - 10.80 K/mcL    RBC 4.09 (L) 4.20 - 5.40 M/mcL    Hemoglobin 11.2 (L) 12.0 - 16.0 g/dL    Hematocrit 33.4 (L) 37.0 - 47.0 %    MCV 81.7 (L) 82.0 - 98.0 fL    MCH 27.4 (L) 28.0 - 32.0 pg    MCHC 33.5 33.0 - 36.0 gm/dL    RDW-SD 44.5 40.0 - 54.0 fL    RDW-CV 15.1 (H) 12.0 - 15.0 %    RDW 15.1 (H) 12 - 15 %    Platelets 236 130 - 400 K/mcL    Neutrophil Rel % 59.30 39.00 - 78.00 %    Lymphocyte Rel % 32.90 15.00 - 45.00 %    Monocyte Rel % 5.60 4.00 - 12.00 %    Eosinophil Rel % 1.70 0.00 - 4.00 %    Basophil Rel % 0.20 0.00 - 2.00 %    Neutrophils Absolute 6.81 1.87 - 8.40 K/mcL    Lymphocytes Absolute 3.77 0.72 - 4.86 K/mcL    Monocytes Absolute 0.64 0.19 - 1.30 K/mcL    Eosinophils Absolute 0.19 0.00 - 0.70 K/mcL    Basophils Absolute 0.02 0.00 - 0.20 K/mcL    Differential Type AUTO     Platelet Morphology Normal      *Note: Due to a large number of results and/or encounters for the requested time period, some results have not  been displayed. A complete set of results can be found in Results Review.       Some portions of this note have been dictated using voice recognition software and may contain errors and/or omissions.

## 2018-10-15 RX ORDER — ALOGLIPTIN 12.5 MG/1
TABLET, FILM COATED ORAL
Qty: 30 TABLET | Refills: 0 | Status: SHIPPED | OUTPATIENT
Start: 2018-10-15 | End: 2018-10-16 | Stop reason: SDUPTHER

## 2018-10-16 RX ORDER — LEVOTHYROXINE SODIUM 175 UG/1
TABLET ORAL
Qty: 45 TABLET | Refills: 0 | Status: SHIPPED | OUTPATIENT
Start: 2018-10-16 | End: 2018-12-12 | Stop reason: SDUPTHER

## 2018-10-16 RX ORDER — ALOGLIPTIN 12.5 MG/1
1 TABLET, FILM COATED ORAL DAILY
Qty: 30 TABLET | Refills: 0 | Status: SHIPPED | OUTPATIENT
Start: 2018-10-16 | End: 2019-02-11 | Stop reason: SDUPTHER

## 2018-12-12 RX ORDER — LEVOTHYROXINE SODIUM 175 UG/1
TABLET ORAL
Qty: 45 TABLET | Refills: 0 | Status: SHIPPED | OUTPATIENT
Start: 2018-12-12 | End: 2018-12-13 | Stop reason: SDUPTHER

## 2018-12-13 RX ORDER — LEVOTHYROXINE SODIUM 175 UG/1
TABLET ORAL
Qty: 45 TABLET | Refills: 6 | Status: SHIPPED | OUTPATIENT
Start: 2018-12-13

## 2019-02-11 RX ORDER — ALOGLIPTIN 12.5 MG/1
TABLET, FILM COATED ORAL
Qty: 30 TABLET | Refills: 0 | Status: SHIPPED | OUTPATIENT
Start: 2019-02-11 | End: 2019-02-11 | Stop reason: SDUPTHER

## 2019-02-11 RX ORDER — ALOGLIPTIN 12.5 MG/1
1 TABLET, FILM COATED ORAL DAILY
Qty: 30 TABLET | Refills: 0 | Status: SHIPPED | OUTPATIENT
Start: 2019-02-11

## 2019-07-18 RX ORDER — LEVOTHYROXINE SODIUM 175 UG/1
TABLET ORAL
Qty: 45 TABLET | Refills: 0 | OUTPATIENT
Start: 2019-07-18

## 2020-02-25 ENCOUNTER — OFFICE VISIT (OUTPATIENT)
Dept: GASTROENTEROLOGY | Facility: CLINIC | Age: 40
End: 2020-02-25

## 2020-02-25 ENCOUNTER — LAB (OUTPATIENT)
Dept: LAB | Facility: HOSPITAL | Age: 40
End: 2020-02-25

## 2020-02-25 VITALS
DIASTOLIC BLOOD PRESSURE: 100 MMHG | WEIGHT: 206 LBS | BODY MASS INDEX: 33.11 KG/M2 | HEART RATE: 72 BPM | SYSTOLIC BLOOD PRESSURE: 140 MMHG | OXYGEN SATURATION: 99 % | HEIGHT: 66 IN

## 2020-02-25 DIAGNOSIS — B18.2 CHRONIC HEPATITIS C WITHOUT HEPATIC COMA (HCC): ICD-10-CM

## 2020-02-25 DIAGNOSIS — R79.89 ELEVATED LIVER FUNCTION TESTS: ICD-10-CM

## 2020-02-25 DIAGNOSIS — B18.2 CHRONIC HEPATITIS C WITHOUT HEPATIC COMA (HCC): Primary | ICD-10-CM

## 2020-02-25 LAB
ALBUMIN SERPL-MCNC: 3.7 G/DL (ref 3.5–5.2)
ALBUMIN/GLOB SERPL: 1.1 G/DL
ALP SERPL-CCNC: 80 U/L (ref 39–117)
ALPHA-FETOPROTEIN: 3.99 NG/ML (ref 0–8.3)
ALPHA1 GLOB MFR UR ELPH: 139 MG/DL (ref 90–200)
ALT SERPL W P-5'-P-CCNC: 32 U/L (ref 1–33)
AMPHET+METHAMPHET UR QL: NEGATIVE
AMPHETAMINES UR QL: NEGATIVE
ANION GAP SERPL CALCULATED.3IONS-SCNC: 12.7 MMOL/L (ref 5–15)
AST SERPL-CCNC: 30 U/L (ref 1–32)
BARBITURATES UR QL SCN: NEGATIVE
BASOPHILS # BLD AUTO: 0.07 10*3/MM3 (ref 0–0.2)
BASOPHILS NFR BLD AUTO: 0.7 % (ref 0–1.5)
BENZODIAZ UR QL SCN: NEGATIVE
BILIRUB SERPL-MCNC: 0.3 MG/DL (ref 0.2–1.2)
BUN BLD-MCNC: 6 MG/DL (ref 6–20)
BUN/CREAT SERPL: 7.7 (ref 7–25)
BUPRENORPHINE SERPL-MCNC: NEGATIVE NG/ML
CALCIUM SPEC-SCNC: 8.9 MG/DL (ref 8.6–10.5)
CANNABINOIDS SERPL QL: NEGATIVE
CERULOPLASMIN SERPL-MCNC: 31 MG/DL (ref 19–39)
CHLORIDE SERPL-SCNC: 97 MMOL/L (ref 98–107)
CO2 SERPL-SCNC: 23.3 MMOL/L (ref 22–29)
COCAINE UR QL: NEGATIVE
CREAT BLD-MCNC: 0.78 MG/DL (ref 0.57–1)
DEPRECATED RDW RBC AUTO: 46.9 FL (ref 37–54)
EOSINOPHIL # BLD AUTO: 0.22 10*3/MM3 (ref 0–0.4)
EOSINOPHIL NFR BLD AUTO: 2.1 % (ref 0.3–6.2)
ERYTHROCYTE [DISTWIDTH] IN BLOOD BY AUTOMATED COUNT: 15.8 % (ref 12.3–15.4)
ETHANOL BLD-MCNC: <10 MG/DL (ref 0–10)
ETHANOL UR QL: <0.01 %
GFR SERPL CREATININE-BSD FRML MDRD: 81 ML/MIN/1.73
GLOBULIN UR ELPH-MCNC: 3.5 GM/DL
GLUCOSE BLD-MCNC: 330 MG/DL (ref 65–99)
HCT VFR BLD AUTO: 38.2 % (ref 34–46.6)
HGB BLD-MCNC: 12.6 G/DL (ref 12–15.9)
IMM GRANULOCYTES # BLD AUTO: 0.07 10*3/MM3 (ref 0–0.05)
IMM GRANULOCYTES NFR BLD AUTO: 0.7 % (ref 0–0.5)
LYMPHOCYTES # BLD AUTO: 3 10*3/MM3 (ref 0.7–3.1)
LYMPHOCYTES NFR BLD AUTO: 29.2 % (ref 19.6–45.3)
MCH RBC QN AUTO: 27.3 PG (ref 26.6–33)
MCHC RBC AUTO-ENTMCNC: 33 G/DL (ref 31.5–35.7)
MCV RBC AUTO: 82.7 FL (ref 79–97)
METHADONE UR QL SCN: NEGATIVE
MONOCYTES # BLD AUTO: 0.33 10*3/MM3 (ref 0.1–0.9)
MONOCYTES NFR BLD AUTO: 3.2 % (ref 5–12)
NEUTROPHILS # BLD AUTO: 6.57 10*3/MM3 (ref 1.7–7)
NEUTROPHILS NFR BLD AUTO: 64.1 % (ref 42.7–76)
NRBC BLD AUTO-RTO: 0.1 /100 WBC (ref 0–0.2)
OPIATES UR QL: NEGATIVE
OXYCODONE UR QL SCN: NEGATIVE
PCP UR QL SCN: NEGATIVE
PLATELET # BLD AUTO: 207 10*3/MM3 (ref 140–450)
PMV BLD AUTO: 11.2 FL (ref 6–12)
POTASSIUM BLD-SCNC: 3.4 MMOL/L (ref 3.5–5.2)
PROPOXYPH UR QL: NEGATIVE
PROT SERPL-MCNC: 7.2 G/DL (ref 6–8.5)
RBC # BLD AUTO: 4.62 10*6/MM3 (ref 3.77–5.28)
SODIUM BLD-SCNC: 133 MMOL/L (ref 136–145)
TRICYCLICS UR QL SCN: NEGATIVE
WBC NRBC COR # BLD: 10.26 10*3/MM3 (ref 3.4–10.8)

## 2020-02-25 PROCEDURE — 86803 HEPATITIS C AB TEST: CPT

## 2020-02-25 PROCEDURE — 83516 IMMUNOASSAY NONANTIBODY: CPT

## 2020-02-25 PROCEDURE — 87902 NFCT AGT GNTYP ALYS HEP C: CPT

## 2020-02-25 PROCEDURE — 87340 HEPATITIS B SURFACE AG IA: CPT

## 2020-02-25 PROCEDURE — 86707 HEPATITIS BE ANTIBODY: CPT

## 2020-02-25 PROCEDURE — 87900 PHENOTYPE INFECT AGENT DRUG: CPT

## 2020-02-25 PROCEDURE — 80307 DRUG TEST PRSMV CHEM ANLYZR: CPT

## 2020-02-25 PROCEDURE — 82105 ALPHA-FETOPROTEIN SERUM: CPT

## 2020-02-25 PROCEDURE — 81596 NFCT DS CHRNC HCV 6 ASSAYS: CPT

## 2020-02-25 PROCEDURE — 86225 DNA ANTIBODY NATIVE: CPT

## 2020-02-25 PROCEDURE — 86705 HEP B CORE ANTIBODY IGM: CPT

## 2020-02-25 PROCEDURE — 86706 HEP B SURFACE ANTIBODY: CPT

## 2020-02-25 PROCEDURE — 80053 COMPREHEN METABOLIC PANEL: CPT

## 2020-02-25 PROCEDURE — 82390 ASSAY OF CERULOPLASMIN: CPT

## 2020-02-25 PROCEDURE — 86704 HEP B CORE ANTIBODY TOTAL: CPT

## 2020-02-25 PROCEDURE — 87350 HEPATITIS BE AG IA: CPT

## 2020-02-25 PROCEDURE — 86038 ANTINUCLEAR ANTIBODIES: CPT

## 2020-02-25 PROCEDURE — 87522 HEPATITIS C REVRS TRNSCRPJ: CPT

## 2020-02-25 PROCEDURE — 99213 OFFICE O/P EST LOW 20 MIN: CPT | Performed by: INTERNAL MEDICINE

## 2020-02-25 PROCEDURE — 36415 COLL VENOUS BLD VENIPUNCTURE: CPT

## 2020-02-25 PROCEDURE — 82103 ALPHA-1-ANTITRYPSIN TOTAL: CPT

## 2020-02-25 PROCEDURE — 85025 COMPLETE CBC W/AUTO DIFF WBC: CPT

## 2020-02-25 RX ORDER — HYDROXYZINE PAMOATE 25 MG/1
CAPSULE ORAL
COMMUNITY
Start: 2020-02-20

## 2020-02-25 NOTE — PATIENT INSTRUCTIONS

## 2020-02-25 NOTE — PROGRESS NOTES
[facility]      Chief Complaint:   Chief Complaint   Patient presents with   • Hepatitis C   • Elevated Hepatic Enzymes       Subjective     HPI:   Patient with elevated LFTs.  Patient recently underwent hepatitis C testing is found to be positive.  Patient has been evaluated here 2 years ago for elevated liver function tests at that time her hepatitis C antibody was negative.  Patient states she has had tattoos and some questionable life decisions in between then that have possibly contributed to her being hepatitis C positive.  Patient had an ultrasound of the abdomen will attempt to get the results of the ultrasound of the abdomen.    Plan; we will have patient follow-up in 4 weeks.  We will undergo lab work for elevated liver function test.  Patient will probably need treatment for hepatitis C.    Past Medical History:   Past Medical History:   Diagnosis Date   • Anxiety state    • Cancer (CMS/HCC)     THYROID   • Endogenous obesity    • History of malignant neoplasm of thyroid    • Postoperative hypothyroidism    • Type 2 diabetes mellitus (CMS/HCC)    • Vitamin D deficiency        Past Surgical History:  Past Surgical History:   Procedure Laterality Date   • CHOLECYSTECTOMY     • THYROIDECTOMY     • TONSILLECTOMY         Family History:  Family History   Problem Relation Age of Onset   • Hypertension Other    • Thyroid disease Other    • Diabetes Other    • Heart disease Other    • Stroke Other    • Breast cancer Other    • Cholelithiasis Other    • Kidney disease Other    • Pancreatic cancer Other    • Prostate cancer Father    • Stroke Paternal Grandmother        Social History:   reports that she has been smoking. She has never used smokeless tobacco. She reports that she does not drink alcohol or use drugs.    Medications:   Prior to Admission medications    Medication Sig Start Date End Date Taking? Authorizing Provider   Alogliptin Benzoate 12.5 MG tablet Take 1 tablet by mouth  Daily. 2/11/19  Yes Siddhartha Cole APRN   DULoxetine (CYMBALTA) 60 MG capsule Take 60 mg by mouth Daily.   Yes Reji Sawyer MD   hydrOXYzine pamoate (VISTARIL) 25 MG capsule  2/20/20  Yes Reji Sawyer MD   levothyroxine (SYNTHROID, LEVOTHROID) 175 MCG tablet TAKE ONE TABLET BY MOUTH EVERY DAY EXCEPT ON SUNDAY TAKE TWO TABLETS BY MOUTH 12/13/18  Yes Siddhartha Cole APRN   traZODone (DESYREL) 50 MG tablet Take 50 mg by mouth Every Night.   Yes Reji Sawyer MD   busPIRone (BUSPAR) 5 MG tablet Take  by mouth. TAKE 1-2 TABLETS BY MOUTH TID    Reji Sawyer MD   cetirizine (zyrTEC) 10 MG tablet Take 10 mg by mouth Daily.    Reji Sawyer MD   gabapentin (NEURONTIN) 100 MG capsule Take 200 mg by mouth 3 (Three) Times a Day.    Reji Sawyer MD   hydrochlorothiazide (HYDRODIURIL) 25 MG tablet Take 25 mg by mouth daily.    Reji Sawyer MD   lisinopril (PRINIVIL,ZESTRIL) 10 MG tablet Take 10 mg by mouth Daily.    Reji Sawyer MD   metFORMIN (GLUCOPHAGE) 1000 MG tablet Take 1 tablet by mouth 2 (Two) Times a Day With Meals. 3/7/18   Siddhartha Cole APRN   montelukast (SINGULAIR) 10 MG tablet Take 10 mg by mouth.    Reji Sawyer MD   prazosin (MINIPRESS) 1 MG capsule Take 1 mg by mouth Every Night.    Reji Sawyer MD       Allergies:  Patient has no known allergies.    ROS:    Review of Systems   Constitutional: Negative for activity change, appetite change, chills, diaphoresis, fatigue, fever and unexpected weight change.   HENT: Negative for sore throat and trouble swallowing.    Respiratory: Negative for shortness of breath.    Gastrointestinal: Negative for abdominal distention, abdominal pain, anal bleeding, blood in stool, constipation, diarrhea, nausea, rectal pain and vomiting.   Endocrine: Negative for polydipsia, polyphagia and polyuria.   Genitourinary: Negative for difficulty urinating.   Musculoskeletal:  "Negative for arthralgias.   Skin: Negative for pallor.   Allergic/Immunologic: Negative for food allergies.   Neurological: Negative for weakness and light-headedness.   Psychiatric/Behavioral: Negative for behavioral problems.     Objective     Blood pressure 140/100, pulse 72, height 167.6 cm (66\"), weight 93.4 kg (206 lb), SpO2 99 %.    Physical Exam   Constitutional: She is oriented to person, place, and time. She appears well-developed and well-nourished. No distress.   HENT:   Head: Normocephalic and atraumatic.   Cardiovascular: Normal rate, regular rhythm, normal heart sounds and intact distal pulses. Exam reveals no gallop and no friction rub.   No murmur heard.  Pulmonary/Chest: Breath sounds normal. No respiratory distress. She has no wheezes. She has no rales. She exhibits no tenderness.   Abdominal: Soft. Bowel sounds are normal. She exhibits no distension and no mass. There is no tenderness. There is no rebound and no guarding. No hernia.   Musculoskeletal: Normal range of motion. She exhibits no edema.   Neurological: She is alert and oriented to person, place, and time.   Skin: Skin is warm and dry. No rash noted. She is not diaphoretic. No erythema. No pallor.   Psychiatric: She has a normal mood and affect. Her behavior is normal. Judgment and thought content normal.        Assessment/Plan   Trina was seen today for hepatitis c and elevated hepatic enzymes.    Diagnoses and all orders for this visit:    Chronic hepatitis C without hepatic coma (CMS/HCC)  -     AFP Tumor Marker; Future  -     CBC & Differential; Future  -     Comprehensive Metabolic Panel; Future  -     Ethanol; Future  -     HCV FibroSURE; Future  -     HCV NS5A Drug Resistance Assay; Future  -     Hepatitis C Genotype; Future  -     Hepatitis C RNA, Quantitative, PCR (graph); Future  -     Urine Drug Screen - Urine, Clean Catch; Future  -     Hepatitis B & C Profile; Future  -     Mitochondrial Antibodies, M2; Future  -     " Ceruloplasmin; Future  -     Anti-Smooth Muscle Antibody Titer; Future  -     DARWIN; Future  -     Alpha - 1 - Antitrypsin; Future    Elevated liver function tests  -     Mitochondrial Antibodies, M2; Future  -     Ceruloplasmin; Future  -     Anti-Smooth Muscle Antibody Titer; Future  -     DARWIN; Future  -     Alpha - 1 - Antitrypsin; Future        * Surgery not found *     Diagnosis Plan   1. Chronic hepatitis C without hepatic coma (CMS/HCC)  AFP Tumor Marker    CBC & Differential    Comprehensive Metabolic Panel    Ethanol    HCV FibroSURE    HCV NS5A Drug Resistance Assay    Hepatitis C Genotype    Hepatitis C RNA, Quantitative, PCR (graph)    Urine Drug Screen - Urine, Clean Catch    Hepatitis B & C Profile    Mitochondrial Antibodies, M2    Ceruloplasmin    Anti-Smooth Muscle Antibody Titer    DARWIN    Alpha - 1 - Antitrypsin   2. Elevated liver function tests  Mitochondrial Antibodies, M2    Ceruloplasmin    Anti-Smooth Muscle Antibody Titer    DARWIN    Alpha - 1 - Antitrypsin       Anticipated Surgical Procedure:  Orders Placed This Encounter   Procedures   • AFP Tumor Marker     Standing Status:   Future   • Comprehensive Metabolic Panel     Standing Status:   Future   • Ethanol     Standing Status:   Future   • HCV FibroSURE     Standing Status:   Future   • HCV NS5A Drug Resistance Assay     Standing Status:   Future   • Hepatitis C Genotype     Standing Status:   Future   • Hepatitis C RNA, Quantitative, PCR (graph)     Standing Status:   Future   • Urine Drug Screen - Urine, Clean Catch     Standing Status:   Future   • Hepatitis B & C Profile     Standing Status:   Future   • Mitochondrial Antibodies, M2     Standing Status:   Future   • Ceruloplasmin     Standing Status:   Future   • Anti-Smooth Muscle Antibody Titer     Standing Status:   Future   • DARWIN     Standing Status:   Future   • Alpha - 1 - Antitrypsin     Standing Status:   Future   • CBC & Differential     Standing Status:   Future     Order  Specific Question:   Manual Differential     Answer:   No       The risks, benefits, and alternatives of this procedure have been discussed with the patient or the responsible party- the patient understands and agrees to proceed.

## 2020-02-26 LAB
ACTIN IGG SERPL-ACNC: 10 UNITS (ref 0–19)
ANA SER QL: POSITIVE
DEPRECATED MITOCHONDRIA M2 IGG SER-ACNC: <20 UNITS (ref 0–20)
DSDNA AB SER-ACNC: 1 IU/ML (ref 0–9)
HBV CORE AB SER DONR QL IA: NEGATIVE
HBV CORE IGM SERPL QL IA: NEGATIVE
HBV E AB SERPL QL IA: NEGATIVE
HBV E AG SERPL QL IA: NEGATIVE
HBV SURFACE AB SER QL: NON REACTIVE
HBV SURFACE AG SERPL QL IA: NEGATIVE
HCV AB S/CO SERPL IA: >11 S/CO RATIO (ref 0–0.9)
HCV RNA SERPL NAA+PROBE-ACNC: NORMAL IU/ML
HCV RNA SERPL NAA+PROBE-LOG IU: 5 LOG10 IU/ML
LABORATORY COMMENT REPORT: NORMAL
Lab: NORMAL
TEST INFORMATION: NORMAL

## 2020-02-27 LAB
A2 MACROGLOB SERPL-MCNC: 227 MG/DL (ref 110–276)
ALT SERPL W P-5'-P-CCNC: 39 IU/L (ref 0–40)
APO A-I SERPL-MCNC: 95 MG/DL (ref 116–209)
BILIRUB SERPL-MCNC: 0.3 MG/DL (ref 0–1.2)
FIBROSIS SCORING:: ABNORMAL
FIBROSIS STAGE SERPL QL: ABNORMAL
GGT SERPL-CCNC: 46 IU/L (ref 0–60)
HAPTOGLOB SERPL-MCNC: 267 MG/DL (ref 42–296)
HCV AB SER QL: ABNORMAL
HCV GENTYP SERPL NAA+PROBE: NORMAL
LABORATORY COMMENT REPORT: ABNORMAL
LIMITATIONS:: ABNORMAL
LIVER FIBR SCORE SERPL CALC.FIBROSURE: 0.17 (ref 0–0.21)
Lab: NORMAL
NECROINFLAMM ACTIVITY SCORING:: ABNORMAL
NECROINFLAMMATORY ACT GRADE SERPL QL: ABNORMAL
NECROINFLAMMATORY ACT SCORE SERPL: 0.18 (ref 0–0.17)

## 2020-03-07 LAB
HCV NS5 MUT DET ISLT GENOTYP: NORMAL
REF LAB TEST METHOD: NORMAL

## 2020-04-21 ENCOUNTER — TELEMEDICINE (OUTPATIENT)
Dept: GASTROENTEROLOGY | Facility: CLINIC | Age: 40
End: 2020-04-21

## 2020-04-21 DIAGNOSIS — B18.2 CHRONIC HEPATITIS C WITHOUT HEPATIC COMA (HCC): Primary | ICD-10-CM

## 2020-04-21 PROCEDURE — 99213 OFFICE O/P EST LOW 20 MIN: CPT | Performed by: INTERNAL MEDICINE

## 2020-04-21 RX ORDER — VELPATASVIR AND SOFOSBUVIR 100; 400 MG/1; MG/1
1 TABLET, FILM COATED ORAL DAILY
Qty: 30 TABLET | Refills: 2 | Status: SHIPPED | OUTPATIENT
Start: 2020-04-21 | End: 2020-05-14

## 2020-04-21 NOTE — PROGRESS NOTES
[facility]      Chief Complaint:   No chief complaint on file.  You have chosen to receive care through a telehealth visit.  Do you consent to use a video/audio connection for your medical care today? Yes    Subjective     HPI:   Patient with hepatitis C genotype 1.  Patient is treatment naïve denies any abdominal pain labs show patient to have hepatitis C but LFTs are within normal range.  Patient had a ultrasound done in [facility] which she reports is normal.    Plan; we will start patient on Epclusa.  Will have patient follow-up in 1 month.    Past Medical History:   Past Medical History:   Diagnosis Date   • Anxiety state    • Cancer (CMS/HCC)     THYROID   • Endogenous obesity    • History of malignant neoplasm of thyroid    • Postoperative hypothyroidism    • Type 2 diabetes mellitus (CMS/HCC)    • Vitamin D deficiency        Past Surgical History:    Past Surgical History:   Procedure Laterality Date   • CHOLECYSTECTOMY     • THYROIDECTOMY     • TONSILLECTOMY         Family History:  Family History   Problem Relation Age of Onset   • Hypertension Other    • Thyroid disease Other    • Diabetes Other    • Heart disease Other    • Stroke Other    • Breast cancer Other    • Cholelithiasis Other    • Kidney disease Other    • Pancreatic cancer Other    • Prostate cancer Father    • Stroke Paternal Grandmother        Social History:   reports that she has been smoking. She has never used smokeless tobacco. She reports that she does not drink alcohol or use drugs.    Medications:   Prior to Admission medications    Medication Sig Start Date End Date Taking? Authorizing Provider   Alogliptin Benzoate 12.5 MG tablet Take 1 tablet by mouth Daily. 2/11/19   Siddhartha Cole APRN   busPIRone (BUSPAR) 5 MG tablet Take  by mouth. TAKE 1-2 TABLETS BY MOUTH TID    ProviderReji MD   cetirizine (zyrTEC) 10 MG tablet Take 10 mg by mouth Daily.    Reji Sawyer MD    DULoxetine (CYMBALTA) 60 MG capsule Take 60 mg by mouth Daily.    Reji Sawyer MD   gabapentin (NEURONTIN) 100 MG capsule Take 200 mg by mouth 3 (Three) Times a Day.    Reji Sawyer MD   hydrochlorothiazide (HYDRODIURIL) 25 MG tablet Take 25 mg by mouth daily.    Reji Sawyer MD   hydrOXYzine pamoate (VISTARIL) 25 MG capsule  2/20/20   Reji Sawyer MD   levothyroxine (SYNTHROID, LEVOTHROID) 175 MCG tablet TAKE ONE TABLET BY MOUTH EVERY DAY EXCEPT ON SUNDAY TAKE TWO TABLETS BY MOUTH 12/13/18   Siddhartha Cole APRN   lisinopril (PRINIVIL,ZESTRIL) 10 MG tablet Take 10 mg by mouth Daily.    Reji Sawyer MD   metFORMIN (GLUCOPHAGE) 1000 MG tablet Take 1 tablet by mouth 2 (Two) Times a Day With Meals. 3/7/18   Siddhartha Cole APRN   montelukast (SINGULAIR) 10 MG tablet Take 10 mg by mouth.    Reji Sawyer MD   prazosin (MINIPRESS) 1 MG capsule Take 1 mg by mouth Every Night.    Reji Sawyer MD   Sofosbuvir-Velpatasvir (Epclusa) 400-100 MG tablet Take 1 tablet by mouth Daily. 4/21/20   Dani Mott MD   traZODone (DESYREL) 50 MG tablet Take 50 mg by mouth Every Night.    Reji Sawyer MD       Allergies:  Patient has no known allergies.    ROS:    Review of Systems   Constitutional: Negative for activity change, appetite change, chills, diaphoresis, fatigue, fever and unexpected weight change.   HENT: Negative for sore throat and trouble swallowing.    Respiratory: Negative for shortness of breath.    Gastrointestinal: Negative for abdominal distention, abdominal pain, anal bleeding, blood in stool, constipation, diarrhea, nausea, rectal pain and vomiting.   Endocrine: Negative for polydipsia, polyphagia and polyuria.   Genitourinary: Negative for difficulty urinating.   Musculoskeletal: Negative for arthralgias.   Skin: Negative for pallor.   Allergic/Immunologic: Negative for food allergies.   Neurological: Negative for weakness  and light-headedness.   Psychiatric/Behavioral: Negative for behavioral problems.     Objective     There were no vitals taken for this visit.    Physical Exam   Constitutional: She appears well-developed and well-nourished.   Pulmonary/Chest: No respiratory distress.   Skin: No erythema.        Assessment/Plan   Diagnoses and all orders for this visit:    Chronic hepatitis C without hepatic coma (CMS/HCC)    Other orders  -     Sofosbuvir-Velpatasvir (Epclusa) 400-100 MG tablet; Take 1 tablet by mouth Daily.        * Surgery not found *     Diagnosis Plan   1. Chronic hepatitis C without hepatic coma (CMS/HCC)         Anticipated Surgical Procedure:  No orders of the defined types were placed in this encounter.      The risks, benefits, and alternatives of this procedure have been discussed with the patient or the responsible party- the patient understands and agrees to proceed.

## 2020-04-22 ENCOUNTER — DOCUMENTATION (OUTPATIENT)
Dept: GASTROENTEROLOGY | Facility: CLINIC | Age: 40
End: 2020-04-22

## 2020-06-05 ENCOUNTER — TELEMEDICINE (OUTPATIENT)
Dept: GASTROENTEROLOGY | Facility: CLINIC | Age: 40
End: 2020-06-05

## 2020-06-05 DIAGNOSIS — B18.2 CHRONIC HEPATITIS C WITHOUT HEPATIC COMA (HCC): Primary | ICD-10-CM

## 2020-06-05 PROCEDURE — 99213 OFFICE O/P EST LOW 20 MIN: CPT | Performed by: INTERNAL MEDICINE

## 2020-06-05 NOTE — PROGRESS NOTES
[facility]      Chief Complaint:   No chief complaint on file.  You have chosen to receive care through a telehealth visit.  Do you consent to use a video/audio connection for your medical care today? Yes      Subjective     HPI:   Patient with hepatitis C.  Patient has started her Epclusa and is completed 1 month.  Patient denies any abdominal pain nausea vomiting or difficulty with the medication.  Blood patient follow-up in 1 month with repeat lab work.    Plan; we will have patient follow-up in 1 month with repeat lab work.    Past Medical History:   Past Medical History:   Diagnosis Date   • Anxiety state    • Cancer (CMS/HCC)     THYROID   • Endogenous obesity    • History of malignant neoplasm of thyroid    • Postoperative hypothyroidism    • Type 2 diabetes mellitus (CMS/HCC)    • Vitamin D deficiency        Past Surgical History:    Past Surgical History:   Procedure Laterality Date   • CHOLECYSTECTOMY     • THYROIDECTOMY     • TONSILLECTOMY         Family History:  Family History   Problem Relation Age of Onset   • Hypertension Other    • Thyroid disease Other    • Diabetes Other    • Heart disease Other    • Stroke Other    • Breast cancer Other    • Cholelithiasis Other    • Kidney disease Other    • Pancreatic cancer Other    • Prostate cancer Father    • Stroke Paternal Grandmother        Social History:   reports that she has been smoking. She has never used smokeless tobacco. She reports that she does not drink alcohol or use drugs.    Medications:   Prior to Admission medications    Medication Sig Start Date End Date Taking? Authorizing Provider   Alogliptin Benzoate 12.5 MG tablet Take 1 tablet by mouth Daily. 2/11/19   Siddhartha Cole APRN   busPIRone (BUSPAR) 5 MG tablet Take  by mouth. TAKE 1-2 TABLETS BY MOUTH TID    ProviderReji MD   cetirizine (zyrTEC) 10 MG tablet Take 10 mg by mouth Daily.    ProviderReji MD   DULoxetine (CYMBALTA) 60 MG  capsule Take 60 mg by mouth Daily.    Reji Sawyer MD   gabapentin (NEURONTIN) 100 MG capsule Take 200 mg by mouth 3 (Three) Times a Day.    Reji Sawyer MD   Glecaprevir-Pibrentasvir (Mavyret) 100-40 MG tablet Take 3 tablets by mouth Daily. 5/14/20   Dani Mott MD   hydrochlorothiazide (HYDRODIURIL) 25 MG tablet Take 25 mg by mouth daily.    Reji Sawyer MD   hydrOXYzine pamoate (VISTARIL) 25 MG capsule  2/20/20   Reji Sawyer MD   levothyroxine (SYNTHROID, LEVOTHROID) 175 MCG tablet TAKE ONE TABLET BY MOUTH EVERY DAY EXCEPT ON SUNDAY TAKE TWO TABLETS BY MOUTH 12/13/18   Siddhartha Cole APRN   lisinopril (PRINIVIL,ZESTRIL) 10 MG tablet Take 10 mg by mouth Daily.    Reji Sawyer MD   metFORMIN (GLUCOPHAGE) 1000 MG tablet Take 1 tablet by mouth 2 (Two) Times a Day With Meals. 3/7/18   Siddhartha Cole APRN   montelukast (SINGULAIR) 10 MG tablet Take 10 mg by mouth.    Reji Sawyer MD   prazosin (MINIPRESS) 1 MG capsule Take 1 mg by mouth Every Night.    Rjei Sawyer MD   traZODone (DESYREL) 50 MG tablet Take 50 mg by mouth Every Night.    Reji Sawyer MD       Allergies:  Patient has no known allergies.    ROS:    Review of Systems   Constitutional: Negative for activity change, appetite change, chills, diaphoresis, fatigue, fever and unexpected weight change.   HENT: Negative for sore throat and trouble swallowing.    Respiratory: Negative for shortness of breath.    Gastrointestinal: Negative for abdominal distention, abdominal pain, anal bleeding, blood in stool, constipation, diarrhea, nausea, rectal pain and vomiting.   Endocrine: Negative for polydipsia, polyphagia and polyuria.   Genitourinary: Negative for difficulty urinating.   Musculoskeletal: Negative for arthralgias.   Skin: Negative for pallor.   Allergic/Immunologic: Negative for food allergies.   Neurological: Negative for weakness and light-headedness.    Psychiatric/Behavioral: Negative for behavioral problems.     Objective     There were no vitals taken for this visit.    Physical Exam   Constitutional: She appears well-developed and well-nourished.   Pulmonary/Chest: No respiratory distress.   Psychiatric: She has a normal mood and affect.        Assessment/Plan   Diagnoses and all orders for this visit:    Chronic hepatitis C without hepatic coma (CMS/HCC)  -     Hepatitis C RNA, Quantitative, PCR (graph); Future  -     Comprehensive Metabolic Panel; Future  -     CBC & Differential; Future        * Surgery not found *     Diagnosis Plan   1. Chronic hepatitis C without hepatic coma (CMS/HCC)  Hepatitis C RNA, Quantitative, PCR (graph)    Comprehensive Metabolic Panel    CBC & Differential       Anticipated Surgical Procedure:  Orders Placed This Encounter   Procedures   • Hepatitis C RNA, Quantitative, PCR (graph)     Standing Status:   Future   • Comprehensive Metabolic Panel     Standing Status:   Future   • CBC & Differential     Standing Status:   Future     Order Specific Question:   Manual Differential     Answer:   No       The risks, benefits, and alternatives of this procedure have been discussed with the patient or the responsible party- the patient understands and agrees to proceed.

## 2020-07-22 ENCOUNTER — LAB (OUTPATIENT)
Dept: LAB | Facility: HOSPITAL | Age: 40
End: 2020-07-22

## 2020-07-22 DIAGNOSIS — B18.2 CHRONIC HEPATITIS C WITHOUT HEPATIC COMA (HCC): ICD-10-CM

## 2020-07-22 LAB
ALBUMIN SERPL-MCNC: 3.4 G/DL (ref 3.5–5.2)
ALBUMIN/GLOB SERPL: 1.1 G/DL
ALP SERPL-CCNC: 79 U/L (ref 39–117)
ALT SERPL W P-5'-P-CCNC: <5 U/L (ref 1–33)
ANION GAP SERPL CALCULATED.3IONS-SCNC: 10 MMOL/L (ref 5–15)
AST SERPL-CCNC: 8 U/L (ref 1–32)
BASOPHILS # BLD AUTO: 0.05 10*3/MM3 (ref 0–0.2)
BASOPHILS NFR BLD AUTO: 0.5 % (ref 0–1.5)
BILIRUB SERPL-MCNC: <0.2 MG/DL (ref 0–1.2)
BUN SERPL-MCNC: 6 MG/DL (ref 6–20)
BUN/CREAT SERPL: 7.4 (ref 7–25)
CALCIUM SPEC-SCNC: 8.7 MG/DL (ref 8.6–10.5)
CHLORIDE SERPL-SCNC: 102 MMOL/L (ref 98–107)
CO2 SERPL-SCNC: 24 MMOL/L (ref 22–29)
CREAT SERPL-MCNC: 0.81 MG/DL (ref 0.57–1)
DEPRECATED RDW RBC AUTO: 41.1 FL (ref 37–54)
EOSINOPHIL # BLD AUTO: 0.16 10*3/MM3 (ref 0–0.4)
EOSINOPHIL NFR BLD AUTO: 1.6 % (ref 0.3–6.2)
ERYTHROCYTE [DISTWIDTH] IN BLOOD BY AUTOMATED COUNT: 15.1 % (ref 12.3–15.4)
GFR SERPL CREATININE-BSD FRML MDRD: 78 ML/MIN/1.73
GLOBULIN UR ELPH-MCNC: 3.2 GM/DL
GLUCOSE SERPL-MCNC: 211 MG/DL (ref 65–99)
HCT VFR BLD AUTO: 35.7 % (ref 34–46.6)
HGB BLD-MCNC: 11.8 G/DL (ref 12–15.9)
IMM GRANULOCYTES # BLD AUTO: 0.02 10*3/MM3 (ref 0–0.05)
IMM GRANULOCYTES NFR BLD AUTO: 0.2 % (ref 0–0.5)
LYMPHOCYTES # BLD AUTO: 3.26 10*3/MM3 (ref 0.7–3.1)
LYMPHOCYTES NFR BLD AUTO: 32.4 % (ref 19.6–45.3)
MCH RBC QN AUTO: 25.4 PG (ref 26.6–33)
MCHC RBC AUTO-ENTMCNC: 33.1 G/DL (ref 31.5–35.7)
MCV RBC AUTO: 76.8 FL (ref 79–97)
MONOCYTES # BLD AUTO: 0.41 10*3/MM3 (ref 0.1–0.9)
MONOCYTES NFR BLD AUTO: 4.1 % (ref 5–12)
NEUTROPHILS NFR BLD AUTO: 6.15 10*3/MM3 (ref 1.7–7)
NEUTROPHILS NFR BLD AUTO: 61.2 % (ref 42.7–76)
NRBC BLD AUTO-RTO: 0 /100 WBC (ref 0–0.2)
PLATELET # BLD AUTO: 347 10*3/MM3 (ref 140–450)
PMV BLD AUTO: 9.5 FL (ref 6–12)
POTASSIUM SERPL-SCNC: 3.4 MMOL/L (ref 3.5–5.2)
PROT SERPL-MCNC: 6.6 G/DL (ref 6–8.5)
RBC # BLD AUTO: 4.65 10*6/MM3 (ref 3.77–5.28)
SODIUM SERPL-SCNC: 136 MMOL/L (ref 136–145)
WBC # BLD AUTO: 10.05 10*3/MM3 (ref 3.4–10.8)

## 2020-07-22 PROCEDURE — 87522 HEPATITIS C REVRS TRNSCRPJ: CPT

## 2020-07-22 PROCEDURE — 80053 COMPREHEN METABOLIC PANEL: CPT

## 2020-07-22 PROCEDURE — 85025 COMPLETE CBC W/AUTO DIFF WBC: CPT

## 2020-07-25 LAB
HCV RNA SERPL NAA+PROBE-ACNC: NORMAL IU/ML
TEST INFORMATION: NORMAL

## 2020-07-31 ENCOUNTER — OFFICE VISIT (OUTPATIENT)
Dept: GASTROENTEROLOGY | Facility: CLINIC | Age: 40
End: 2020-07-31

## 2020-07-31 VITALS
HEART RATE: 66 BPM | WEIGHT: 187.6 LBS | OXYGEN SATURATION: 99 % | BODY MASS INDEX: 30.15 KG/M2 | SYSTOLIC BLOOD PRESSURE: 130 MMHG | HEIGHT: 66 IN | DIASTOLIC BLOOD PRESSURE: 80 MMHG

## 2020-07-31 DIAGNOSIS — B18.2 CHRONIC HEPATITIS C WITHOUT HEPATIC COMA (HCC): Primary | ICD-10-CM

## 2020-07-31 PROCEDURE — 99214 OFFICE O/P EST MOD 30 MIN: CPT | Performed by: INTERNAL MEDICINE

## 2020-07-31 RX ORDER — DULOXETIN HYDROCHLORIDE 60 MG/1
60 CAPSULE, DELAYED RELEASE ORAL DAILY
COMMUNITY

## 2020-07-31 NOTE — PATIENT INSTRUCTIONS

## 2020-07-31 NOTE — PROGRESS NOTES
[facility]      Chief Complaint:   Chief Complaint   Patient presents with   • Hepatitis C       Subjective     HPI:   Patient with hepatitis C.  Patient has finished treatment 3 weeks ago and is here for repeat lab work.  Lab work shows patient normal liver function test with nondetected hepatitis C.  Patient denies any abdominal pain nausea vomiting change in bowel habits.    Plan; have patient follow-up in 3 months with repeat lab work    Past Medical History:   Past Medical History:   Diagnosis Date   • Anxiety state    • Cancer (CMS/HCC)     THYROID   • Endogenous obesity    • History of malignant neoplasm of thyroid    • Postoperative hypothyroidism    • Type 2 diabetes mellitus (CMS/HCC)    • Vitamin D deficiency        Past Surgical History:    Past Surgical History:   Procedure Laterality Date   • CHOLECYSTECTOMY     • THYROIDECTOMY     • TONSILLECTOMY         Family History:  Family History   Problem Relation Age of Onset   • Hypertension Other    • Thyroid disease Other    • Diabetes Other    • Heart disease Other    • Stroke Other    • Breast cancer Other    • Cholelithiasis Other    • Kidney disease Other    • Pancreatic cancer Other    • Prostate cancer Father    • Stroke Paternal Grandmother        Social History:   reports that she has been smoking. She has never used smokeless tobacco. She reports that she does not drink alcohol or use drugs.    Medications:   Prior to Admission medications    Medication Sig Start Date End Date Taking? Authorizing Provider   Alogliptin Benzoate 12.5 MG tablet Take 1 tablet by mouth Daily. 2/11/19  Yes Siddhartha Cole APRN   cetirizine (zyrTEC) 10 MG tablet Take 10 mg by mouth Daily.   Yes ProviderReji MD   DULoxetine (Cymbalta) 60 MG capsule Take 60 mg by mouth Daily.   Yes ProviderReji MD   Glecaprevir-Pibrentasvir (Mavyret) 100-40 MG tablet Take 3 tablets by mouth Daily. 5/14/20  Yes Dani Mott MD    hydrochlorothiazide (HYDRODIURIL) 25 MG tablet Take 25 mg by mouth daily.   Yes Reji Sawyer MD   hydrOXYzine pamoate (VISTARIL) 25 MG capsule  2/20/20  Yes Reji Sawyer MD   levothyroxine (SYNTHROID, LEVOTHROID) 175 MCG tablet TAKE ONE TABLET BY MOUTH EVERY DAY EXCEPT ON SUNDAY TAKE TWO TABLETS BY MOUTH 12/13/18  Yes Siddhartha Cole APRN   montelukast (SINGULAIR) 10 MG tablet Take 10 mg by mouth.   Yes Reji Sawyer MD   traZODone (DESYREL) 50 MG tablet Take 50 mg by mouth Every Night.   Yes Reji Sawyer MD   busPIRone (BUSPAR) 5 MG tablet Take  by mouth. TAKE 1-2 TABLETS BY MOUTH TID    Reji Sawyer MD   gabapentin (NEURONTIN) 100 MG capsule Take 200 mg by mouth 3 (Three) Times a Day.    Reji Sawyer MD   lisinopril (PRINIVIL,ZESTRIL) 10 MG tablet Take 10 mg by mouth Daily.    Reji Sawyer MD   metFORMIN (GLUCOPHAGE) 1000 MG tablet Take 1 tablet by mouth 2 (Two) Times a Day With Meals. 3/7/18   Siddhartha Cole APRN   prazosin (MINIPRESS) 1 MG capsule Take 1 mg by mouth Every Night.    Reji Sawyer MD   DULoxetine (CYMBALTA) 60 MG capsule Take 60 mg by mouth Daily.  7/31/20  Reji Sawyer MD       Allergies:  Patient has no known allergies.    ROS:    Review of Systems   Constitutional: Negative for activity change, appetite change, chills, diaphoresis, fatigue, fever and unexpected weight change.   HENT: Negative for sore throat and trouble swallowing.    Respiratory: Negative for shortness of breath.    Gastrointestinal: Negative for abdominal distention, abdominal pain, anal bleeding, blood in stool, constipation, diarrhea, nausea, rectal pain and vomiting.   Endocrine: Negative for polydipsia, polyphagia and polyuria.   Genitourinary: Negative for difficulty urinating.   Musculoskeletal: Negative for arthralgias.   Skin: Negative for pallor.   Allergic/Immunologic: Negative for food allergies.   Neurological:  "Negative for weakness and light-headedness.   Psychiatric/Behavioral: Negative for behavioral problems.     Objective     Blood pressure 130/80, pulse 66, height 167.6 cm (66\"), weight 85.1 kg (187 lb 9.6 oz), SpO2 99 %.    Physical Exam   Constitutional: She is oriented to person, place, and time. She appears well-developed and well-nourished. No distress.   HENT:   Head: Normocephalic and atraumatic.   Cardiovascular: Normal rate, regular rhythm, normal heart sounds and intact distal pulses. Exam reveals no gallop and no friction rub.   No murmur heard.  Pulmonary/Chest: Breath sounds normal. No respiratory distress. She has no wheezes. She has no rales. She exhibits no tenderness.   Abdominal: Soft. Bowel sounds are normal. She exhibits no distension and no mass. There is no tenderness. There is no rebound and no guarding. No hernia.   Musculoskeletal: Normal range of motion. She exhibits no edema.   Neurological: She is alert and oriented to person, place, and time.   Skin: Skin is warm and dry. No rash noted. She is not diaphoretic. No erythema. No pallor.   Psychiatric: She has a normal mood and affect. Her behavior is normal. Judgment and thought content normal.        Assessment/Plan   Trina was seen today for hepatitis c.    Diagnoses and all orders for this visit:    Chronic hepatitis C without hepatic coma (CMS/HCC)  -     Comprehensive Metabolic Panel; Future  -     CBC & Differential; Future  -     Hepatitis C RNA, Quantitative, PCR (graph); Future        * Surgery not found *     Diagnosis Plan   1. Chronic hepatitis C without hepatic coma (CMS/HCC)  Comprehensive Metabolic Panel    CBC & Differential    Hepatitis C RNA, Quantitative, PCR (graph)       Anticipated Surgical Procedure:  Orders Placed This Encounter   Procedures   • Comprehensive Metabolic Panel     Standing Status:   Future   • Hepatitis C RNA, Quantitative, PCR (graph)     Standing Status:   Future   • CBC & Differential     Standing " Status:   Future     Order Specific Question:   Manual Differential     Answer:   No       The risks, benefits, and alternatives of this procedure have been discussed with the patient or the responsible party- the patient understands and agrees to proceed.

## 2020-08-03 ENCOUNTER — NURSE TRIAGE (OUTPATIENT)
Dept: CALL CENTER | Facility: HOSPITAL | Age: 40
End: 2020-08-03

## 2020-08-03 NOTE — TELEPHONE ENCOUNTER
"Kasey states that she has had right knee pain for 2 weeks.  She has taken several different OTC Nsaids, used ice, heat, wrapped it, elevated it and seen her PCP.  Kasey states that nothing she has done has helped at all.  On further questioning she states that leg entire leg swells if she is up on it for very long and behind her knee gets hot when she is swelled.  Denies birth control.    Reason for Disposition  • [1] Thigh, calf, or ankle swelling AND [2] only 1 side    Additional Information  • Negative: Sounds like a life-threatening emergency to the triager  • Negative: Followed a knee injury  • Negative: Leg pain is main symptom  • Negative: Knee swelling is main symptom  • Negative: [1] Swollen joint AND [2] fever  • Negative: [1] Red area or streak AND [2] fever  • Negative: Patient sounds very sick or weak to the triager  • Negative: [1] SEVERE pain (e.g., excruciating, unable to walk) AND [2] not improved after 2 hours of pain medicine  • Negative: [1] Can't move swollen joint at all AND [2] no fever  • Negative: [1] Thigh or calf pain AND [2] only 1 side AND [3] present > 1 hour    Answer Assessment - Initial Assessment Questions  1. LOCATION and RADIATION: \"Where is the pain located?\"       Right knee  2. QUALITY: \"What does the pain feel like?\"  (e.g., sharp, dull, aching, burning)      burning and sharp  3. SEVERITY: \"How bad is the pain?\" \"What does it keep you from doing?\"   (Scale 1-10; or mild, moderate, severe)    -  MILD (1-3): doesn't interfere with normal activities     -  MODERATE (4-7): interferes with normal activities (e.g., work or school) or awakens from sleep, limping     -  SEVERE (8-10): excruciating pain, unable to do any normal activities, unable to walk      severe  4. ONSET: \"When did the pain start?\" \"Does it come and go, or is it there all the time?\"      2 weeks ago  5. RECURRENT: \"Have you had this pain before?\" If so, ask: \"When, and what happened then?\"     no  6. SETTING: " "\"Has there been any recent work, exercise or other activity that involved that part of the body?\"       *No Answer*no  7. AGGRAVATING FACTORS: \"What makes the knee pain worse?\" (e.g., walking, climbing stairs, running)      walking  8. ASSOCIATED SYMPTOMS: \"Is there any swelling or redness of the knee?\"      Swells when I am up on it for very long.  9. OTHER SYMPTOMS: \"Do you have any other symptoms?\" (e.g., chest pain, difficulty breathing, fever, calf pain)      It will get hot back behind my knee when my leg swells  10. PREGNANCY: \"Is there any chance you are pregnant?\" \"When was your last menstrual period?\"        no    Protocols used: KNEE PAIN-ADULT-      "

## 2020-11-02 ENCOUNTER — TELEPHONE (OUTPATIENT)
Dept: GASTROENTEROLOGY | Facility: CLINIC | Age: 40
End: 2020-11-02

## 2020-11-06 ENCOUNTER — OFFICE VISIT (OUTPATIENT)
Dept: GASTROENTEROLOGY | Facility: CLINIC | Age: 40
End: 2020-11-06

## 2020-11-06 DIAGNOSIS — B18.2 CHRONIC HEPATITIS C WITHOUT HEPATIC COMA (HCC): Primary | ICD-10-CM

## 2020-11-06 PROCEDURE — 99442 PR PHYS/QHP TELEPHONE EVALUATION 11-20 MIN: CPT | Performed by: INTERNAL MEDICINE

## 2020-11-06 NOTE — PROGRESS NOTES
[facility]      Chief Complaint:   Chief Complaint   Patient presents with   • Hepatitis C   • Nausea   This visit has been rescheduled as a phone visit to comply with patient safety concerns in accordance with CDC recommendations. Total time of discussion was 20minutes.  You have chosen to receive care through a telephone visit. Do you consent to use a telephone visit for your medical care today? Yes    Subjective     HPI:   Patient with hepatitis C.  Patient is 6 months since therapy for hepatitis C lab work shows patient to be negative for hepatitis C which means this is a cure.  Patient will need to have an ultrasound of the abdomen and alpha-fetoprotein yearly.  Patient follow-up in 1 year with these.    Plan; patient follow-up in 1 year with alpha-fetoprotein and ultrasound the abdomen    Past Medical History:   Past Medical History:   Diagnosis Date   • Anxiety state    • Cancer (CMS/HCC)     THYROID   • Endogenous obesity    • History of malignant neoplasm of thyroid    • Postoperative hypothyroidism    • Type 2 diabetes mellitus (CMS/HCC)    • Vitamin D deficiency        Past Surgical History:  Past Surgical History:   Procedure Laterality Date   • CHOLECYSTECTOMY     • THYROIDECTOMY     • TONSILLECTOMY         Family History:  Family History   Problem Relation Age of Onset   • Hypertension Other    • Thyroid disease Other    • Diabetes Other    • Heart disease Other    • Stroke Other    • Breast cancer Other    • Cholelithiasis Other    • Kidney disease Other    • Pancreatic cancer Other    • Prostate cancer Father    • Stroke Paternal Grandmother        Social History:   reports that she has been smoking. She has never used smokeless tobacco. She reports that she does not drink alcohol or use drugs.    Medications:   Prior to Admission medications    Medication Sig Start Date End Date Taking? Authorizing Provider   DULoxetine (Cymbalta) 60 MG capsule Take 60 mg by mouth Daily.    Yes Reji Sawyer MD   hydrochlorothiazide (HYDRODIURIL) 25 MG tablet Take 25 mg by mouth daily.   Yes Reji Sawyer MD   hydrOXYzine pamoate (VISTARIL) 25 MG capsule  2/20/20  Yes Reji Sawyer MD   levothyroxine (SYNTHROID, LEVOTHROID) 175 MCG tablet TAKE ONE TABLET BY MOUTH EVERY DAY EXCEPT ON SUNDAY TAKE TWO TABLETS BY MOUTH 12/13/18  Yes Siddhartha Cole APRN   traZODone (DESYREL) 50 MG tablet Take 50 mg by mouth Every Night.   Yes Reji Sawyer MD   Alogliptin Benzoate 12.5 MG tablet Take 1 tablet by mouth Daily. 2/11/19   Siddhartha Cole APRN   busPIRone (BUSPAR) 5 MG tablet Take  by mouth. TAKE 1-2 TABLETS BY MOUTH TID    Reji Sawyer MD   cetirizine (zyrTEC) 10 MG tablet Take 10 mg by mouth Daily.    Reji Sawyer MD   gabapentin (NEURONTIN) 100 MG capsule Take 200 mg by mouth 3 (Three) Times a Day.    Reji Sawyer MD   Glecaprevir-Pibrentasvir (Mavyret) 100-40 MG tablet Take 3 tablets by mouth Daily. 5/14/20   Dani Mott MD   lisinopril (PRINIVIL,ZESTRIL) 10 MG tablet Take 10 mg by mouth Daily.    Reji Sawyer MD   metFORMIN (GLUCOPHAGE) 1000 MG tablet Take 1 tablet by mouth 2 (Two) Times a Day With Meals. 3/7/18   Siddhartha Cole APRN   montelukast (SINGULAIR) 10 MG tablet Take 10 mg by mouth.    Reji Sawyer MD   prazosin (MINIPRESS) 1 MG capsule Take 1 mg by mouth Every Night.    Reji Sawyer MD       Allergies:  Patient has no known allergies.    ROS:    Review of Systems   Constitutional: Negative for activity change, appetite change, chills, diaphoresis, fatigue, fever and unexpected weight change.   HENT: Negative for sore throat and trouble swallowing.    Respiratory: Negative for shortness of breath.    Gastrointestinal: Negative for abdominal distention, abdominal pain, anal bleeding, blood in stool, constipation, diarrhea, nausea, rectal pain and vomiting.   Endocrine: Negative for  polydipsia, polyphagia and polyuria.   Genitourinary: Negative for difficulty urinating.   Musculoskeletal: Negative for arthralgias.   Skin: Negative for pallor.   Allergic/Immunologic: Negative for food allergies.   Neurological: Negative for weakness and light-headedness.   Psychiatric/Behavioral: Negative for behavioral problems.     Objective     There were no vitals taken for this visit.    Physical Exam     Assessment/Plan   Diagnoses and all orders for this visit:    1. Chronic hepatitis C without hepatic coma (CMS/HCC) (Primary)  -     AFP Tumor Marker; Future  -     US Abdomen Complete; Future        * Surgery not found *     Diagnosis Plan   1. Chronic hepatitis C without hepatic coma (CMS/HCC)  AFP Tumor Marker    US Abdomen Complete       Anticipated Surgical Procedure:  Orders Placed This Encounter   Procedures   • US Abdomen Complete     Standing Status:   Future     Order Specific Question:   Reason for Exam:     Answer:   hep c   • AFP Tumor Marker     Standing Status:   Future       The risks, benefits, and alternatives of this procedure have been discussed with the patient or the responsible party- the patient understands and agrees to proceed.

## 2020-11-06 NOTE — PATIENT INSTRUCTIONS
"BMI for Adults  What is BMI?  Body mass index (BMI) is a number that is calculated from a person's weight and height. BMI can help estimate how much of a person's weight is composed of fat. BMI does not measure body fat directly. Rather, it is an alternative to procedures that directly measure body fat, which can be difficult and expensive.  BMI can help identify people who may be at higher risk for certain medical problems.  What are BMI measurements used for?  BMI is used as a screening tool to identify possible weight problems. It helps determine whether a person is obese, overweight, a healthy weight, or underweight.  BMI is useful for:  · Identifying a weight problem that may be related to a medical condition or may increase the risk for medical problems.  · Promoting changes, such as changes in diet and exercise, to help reach a healthy weight. BMI screening can be repeated to see if these changes are working.  How is BMI calculated?  BMI involves measuring your weight in relation to your height. Both height and weight are measured, and the BMI is calculated from those numbers. This can be done either in English (U[place] or metric measurements. Note that charts and online BMI calculators are available to help you find your BMI quickly and easily without having to do these calculations yourself.  To calculate your BMI in English (U.S[place] measurements:    1. Measure your weight in pounds (lb).  2. Multiply the number of pounds by 703.  ? For example, for a person who weighs 180 lb, multiply that number by 703, which equals 126,540.  3. Measure your height in inches. Then multiply that number by itself to get a measurement called \"inches squared.\"  ? For example, for a person who is 70 inches tall, the \"inches squared\" measurement is 70 inches x 70 inches, which equals 4,900 inches squared.  4. Divide the total from step 2 (number of lb x 703) by the total from step 3 (inches squared): 126,540 ÷ 4,900 = 25.8. This is " "your BMI.  To calculate your BMI in metric measurements:  1. Measure your weight in kilograms (kg).  2. Measure your height in meters (m). Then multiply that number by itself to get a measurement called \"meters squared.\"  ? For example, for a person who is 1.75 m tall, the \"meters squared\" measurement is 1.75 m x 1.75 m, which is equal to 3.1 meters squared.  3. Divide the number of kilograms (your weight) by the meters squared number. In this example: 70 ÷ 3.1 = 22.6. This is your BMI.  What do the results mean?  BMI charts are used to identify whether you are underweight, normal weight, overweight, or obese. The following guidelines will be used:  · Underweight: BMI less than 18.5.  · Normal weight: BMI between 18.5 and 24.9.  · Overweight: BMI between 25 and 29.9.  · Obese: BMI of 30 or above.  Keep these notes in mind:  · Weight includes both fat and muscle, so someone with a muscular build, such as an athlete, may have a BMI that is higher than 24.9. In cases like these, BMI is not an accurate measure of body fat.  · To determine if excess body fat is the cause of a BMI of 25 or higher, further assessments may need to be done by a health care provider.  · BMI is usually interpreted in the same way for men and women.  Where to find more information  For more information about BMI, including tools to quickly calculate your BMI, go to these websites:  · Centers for Disease Control and Prevention: www.cdc.gov  · American Heart Association: www.heart.org  · National Heart, Lung, and Blood [place]: www.nhlbi.nih.gov  Summary  · Body mass index (BMI) is a number that is calculated from a person's weight and height.  · BMI may help estimate how much of a person's weight is composed of fat. BMI can help identify those who may be at higher risk for certain medical problems.  · BMI can be measured using English measurements or metric measurements.  · BMI charts are used to identify whether you are underweight, normal " weight, overweight, or obese.  This information is not intended to replace advice given to you by your health care provider. Make sure you discuss any questions you have with your health care provider.  Document Released: 08/29/2005 Document Revised: 09/09/2020 Document Reviewed: 07/17/2020  Elsevier Patient Education © 2020 [employer]

## 2021-01-01 ENCOUNTER — TELEPHONE (OUTPATIENT)
Dept: GASTROENTEROLOGY | Facility: CLINIC | Age: 41
End: 2021-01-01

## 2021-01-01 ENCOUNTER — TELEPHONE (OUTPATIENT)
Dept: GENERAL RADIOLOGY | Facility: HOSPITAL | Age: 41
End: 2021-01-01

## 2021-01-01 DIAGNOSIS — B18.2 CHRONIC HEPATITIS C WITHOUT HEPATIC COMA (HCC): Primary | ICD-10-CM

## 2021-11-05 ENCOUNTER — APPOINTMENT (OUTPATIENT)
Dept: ULTRASOUND IMAGING | Facility: HOSPITAL | Age: 41
End: 2021-11-05

## 2021-11-10 ENCOUNTER — APPOINTMENT (OUTPATIENT)
Dept: ULTRASOUND IMAGING | Facility: HOSPITAL | Age: 41
End: 2021-11-10

## 2021-11-15 NOTE — TELEPHONE ENCOUNTER
11/15/12045, 1445 Patient telephone call returned per this staff member ([phone] with notification patient can complete laboratory testing following completion of Ultrasound Abdomen Complete which is scheduled for Tuesday, November 23, 2021 at 10:15 A.M.  Patient verbalized understanding and in agreement with schedule 1 year clinical appointment with Dr. Dani Vidal M.D. on Thursday, December 9, 2021 at 3:30 P.M. at [facility].

## 2021-11-15 NOTE — TELEPHONE ENCOUNTER
----- Message from Aaron Duenas sent at 11/15/2021 10:18 AM CST -----  Contact: [phone]  Called to say that she is having an ultrasound done on 11/23/21 and is wanting to know if she is suppose to have any labs done and if so can she have them done in [facility]. Would like a call back at [phone]. Also when does she need to follow up after ultrasound?

## 2022-01-01 ENCOUNTER — APPOINTMENT (OUTPATIENT)
Dept: MRI IMAGING | Facility: HOSPITAL | Age: 42
End: 2022-01-01

## 2022-01-01 ENCOUNTER — APPOINTMENT (OUTPATIENT)
Dept: GENERAL RADIOLOGY | Facility: HOSPITAL | Age: 42
End: 2022-01-01

## 2022-01-01 ENCOUNTER — APPOINTMENT (OUTPATIENT)
Dept: CT IMAGING | Facility: HOSPITAL | Age: 42
End: 2022-01-01

## 2022-01-01 ENCOUNTER — APPOINTMENT (OUTPATIENT)
Dept: NEUROLOGY | Facility: HOSPITAL | Age: 42
End: 2022-01-01

## 2022-01-01 ENCOUNTER — APPOINTMENT (OUTPATIENT)
Dept: CARDIOLOGY | Facility: HOSPITAL | Age: 42
End: 2022-01-01

## 2022-01-01 ENCOUNTER — HOSPITAL ENCOUNTER (INPATIENT)
Facility: HOSPITAL | Age: 42
LOS: 2 days | End: 2022-11-11
Attending: EMERGENCY MEDICINE | Admitting: INTERNAL MEDICINE

## 2022-01-01 VITALS
TEMPERATURE: 98.4 F | RESPIRATION RATE: 30 BRPM | OXYGEN SATURATION: 76 % | BODY MASS INDEX: 33.83 KG/M2 | HEIGHT: 66 IN | SYSTOLIC BLOOD PRESSURE: 90 MMHG | WEIGHT: 210.5 LBS | DIASTOLIC BLOOD PRESSURE: 41 MMHG

## 2022-01-01 DIAGNOSIS — I46.9 CARDIAC ARREST: Primary | ICD-10-CM

## 2022-01-01 DIAGNOSIS — J81.0 ACUTE PULMONARY EDEMA: ICD-10-CM

## 2022-01-01 LAB
ALBUMIN SERPL-MCNC: 2.9 G/DL (ref 3.5–5.2)
ALBUMIN SERPL-MCNC: 3.4 G/DL (ref 3.5–5.2)
ALBUMIN/GLOB SERPL: 0.9 G/DL
ALBUMIN/GLOB SERPL: 1.1 G/DL
ALP SERPL-CCNC: 69 U/L (ref 39–117)
ALP SERPL-CCNC: 79 U/L (ref 39–117)
ALT SERPL W P-5'-P-CCNC: 14 U/L (ref 1–33)
ALT SERPL W P-5'-P-CCNC: 7 U/L (ref 1–33)
AMORPH URATE CRY URNS QL MICRO: ABNORMAL /HPF
AMPHET+METHAMPHET UR QL: NEGATIVE
AMPHETAMINES UR QL: NEGATIVE
ANION GAP SERPL CALCULATED.3IONS-SCNC: 12 MMOL/L (ref 5–15)
ANION GAP SERPL CALCULATED.3IONS-SCNC: 13 MMOL/L (ref 5–15)
ANION GAP SERPL CALCULATED.3IONS-SCNC: 18 MMOL/L (ref 5–15)
ANISOCYTOSIS BLD QL: ABNORMAL
APTT PPP: 39.6 SECONDS (ref 24.1–35)
ARTERIAL PATENCY WRIST A: ABNORMAL
ARTERIAL PATENCY WRIST A: POSITIVE
AST SERPL-CCNC: 17 U/L (ref 1–32)
AST SERPL-CCNC: 30 U/L (ref 1–32)
ATMOSPHERIC PRESS: 744 MMHG
ATMOSPHERIC PRESS: 754 MMHG
ATMOSPHERIC PRESS: 755 MMHG
ATMOSPHERIC PRESS: 756 MMHG
B-HCG UR QL: NEGATIVE
BACTERIA UR QL AUTO: ABNORMAL /HPF
BARBITURATES UR QL SCN: NEGATIVE
BASE EXCESS BLDA CALC-SCNC: -12.4 MMOL/L (ref 0–2)
BASE EXCESS BLDA CALC-SCNC: -3 MMOL/L (ref 0–2)
BASE EXCESS BLDA CALC-SCNC: -3.3 MMOL/L (ref 0–2)
BASE EXCESS BLDA CALC-SCNC: -7.8 MMOL/L (ref 0–2)
BASOPHILS # BLD AUTO: 0.04 10*3/MM3 (ref 0–0.2)
BASOPHILS # BLD AUTO: 0.04 10*3/MM3 (ref 0–0.2)
BASOPHILS NFR BLD AUTO: 0.1 % (ref 0–1.5)
BASOPHILS NFR BLD AUTO: 0.2 % (ref 0–1.5)
BDY SITE: ABNORMAL
BENZODIAZ UR QL SCN: NEGATIVE
BH CV ECHO MEAS - AO MAX PG: 5.3 MMHG
BH CV ECHO MEAS - AO MEAN PG: 3 MMHG
BH CV ECHO MEAS - AO ROOT DIAM: 2.8 CM
BH CV ECHO MEAS - AO V2 MAX: 115 CM/SEC
BH CV ECHO MEAS - AO V2 VTI: 17.1 CM
BH CV ECHO MEAS - AVA(I,D): 3.2 CM2
BH CV ECHO MEAS - EDV(CUBED): 136.6 ML
BH CV ECHO MEAS - EDV(MOD-SP2): 155 ML
BH CV ECHO MEAS - EDV(MOD-SP4): 141 ML
BH CV ECHO MEAS - EF(MOD-SP2): 19.4 %
BH CV ECHO MEAS - EF(MOD-SP4): 26.2 %
BH CV ECHO MEAS - ESV(CUBED): 97.3 ML
BH CV ECHO MEAS - ESV(MOD-SP2): 125 ML
BH CV ECHO MEAS - ESV(MOD-SP4): 104 ML
BH CV ECHO MEAS - FS: 10.7 %
BH CV ECHO MEAS - IVS/LVPW: 1.11 CM
BH CV ECHO MEAS - IVSD: 1.19 CM
BH CV ECHO MEAS - LA DIMENSION: 1.6 CM
BH CV ECHO MEAS - LAT PEAK E' VEL: 6.1 CM/SEC
BH CV ECHO MEAS - LV DIASTOLIC VOL/BSA (35-75): 68.1 CM2
BH CV ECHO MEAS - LV MASS(C)D: 225.5 GRAMS
BH CV ECHO MEAS - LV MAX PG: 1.73 MMHG
BH CV ECHO MEAS - LV MEAN PG: 1 MMHG
BH CV ECHO MEAS - LV SYSTOLIC VOL/BSA (12-30): 50.2 CM2
BH CV ECHO MEAS - LV V1 MAX: 65.8 CM/SEC
BH CV ECHO MEAS - LV V1 VTI: 11 CM
BH CV ECHO MEAS - LVIDD: 5.2 CM
BH CV ECHO MEAS - LVIDS: 4.6 CM
BH CV ECHO MEAS - LVOT AREA: 4.9 CM2
BH CV ECHO MEAS - LVOT DIAM: 2.5 CM
BH CV ECHO MEAS - LVPWD: 1.07 CM
BH CV ECHO MEAS - MED PEAK E' VEL: 4.6 CM/SEC
BH CV ECHO MEAS - MR MAX PG: 42.7 MMHG
BH CV ECHO MEAS - MR MAX VEL: 314.7 CM/SEC
BH CV ECHO MEAS - MV A MAX VEL: 106 CM/SEC
BH CV ECHO MEAS - MV DEC SLOPE: 468.5 CM/SEC2
BH CV ECHO MEAS - MV DEC TIME: 0.18 MSEC
BH CV ECHO MEAS - MV E MAX VEL: 101 CM/SEC
BH CV ECHO MEAS - MV E/A: 0.95
BH CV ECHO MEAS - MV MAX PG: 4.7 MMHG
BH CV ECHO MEAS - MV MEAN PG: 3 MMHG
BH CV ECHO MEAS - MV P1/2T: 71.9 MSEC
BH CV ECHO MEAS - MV V2 VTI: 30.2 CM
BH CV ECHO MEAS - MVA(P1/2T): 3.1 CM2
BH CV ECHO MEAS - MVA(VTI): 1.79 CM2
BH CV ECHO MEAS - RVDD: 2.22 CM
BH CV ECHO MEAS - SI(MOD-SP2): 14.5 ML/M2
BH CV ECHO MEAS - SI(MOD-SP4): 17.9 ML/M2
BH CV ECHO MEAS - SV(LVOT): 54 ML
BH CV ECHO MEAS - SV(MOD-SP2): 30 ML
BH CV ECHO MEAS - SV(MOD-SP4): 37 ML
BH CV ECHO MEAS - TAPSE (>1.6): 2.25 CM
BH CV ECHO MEASUREMENTS AVERAGE E/E' RATIO: 18.88
BH CV XLRA - TDI S': 10.9 CM/SEC
BILIRUB SERPL-MCNC: 0.3 MG/DL (ref 0–1.2)
BILIRUB SERPL-MCNC: 0.3 MG/DL (ref 0–1.2)
BILIRUB UR QL STRIP: NEGATIVE
BODY TEMPERATURE: 37 C
BUN SERPL-MCNC: 15 MG/DL (ref 6–20)
BUN SERPL-MCNC: 16 MG/DL (ref 6–20)
BUN SERPL-MCNC: 8 MG/DL (ref 6–20)
BUN/CREAT SERPL: 14.9 (ref 7–25)
BUN/CREAT SERPL: 15 (ref 7–25)
BUN/CREAT SERPL: 9.1 (ref 7–25)
BUPRENORPHINE SERPL-MCNC: NEGATIVE NG/ML
BURR CELLS BLD QL SMEAR: ABNORMAL
CALCIUM SPEC-SCNC: 7.9 MG/DL (ref 8.6–10.5)
CALCIUM SPEC-SCNC: 7.9 MG/DL (ref 8.6–10.5)
CALCIUM SPEC-SCNC: 8.3 MG/DL (ref 8.6–10.5)
CANNABINOIDS SERPL QL: NEGATIVE
CHLORIDE SERPL-SCNC: 102 MMOL/L (ref 98–107)
CHLORIDE SERPL-SCNC: 103 MMOL/L (ref 98–107)
CHLORIDE SERPL-SCNC: 104 MMOL/L (ref 98–107)
CHOLEST SERPL-MCNC: 141 MG/DL (ref 0–200)
CK SERPL-CCNC: 302 U/L (ref 20–180)
CLARITY UR: ABNORMAL
CO2 SERPL-SCNC: 16 MMOL/L (ref 22–29)
CO2 SERPL-SCNC: 19 MMOL/L (ref 22–29)
CO2 SERPL-SCNC: 21 MMOL/L (ref 22–29)
COCAINE UR QL: NEGATIVE
COLOR UR: YELLOW
CREAT SERPL-MCNC: 0.88 MG/DL (ref 0.57–1)
CREAT SERPL-MCNC: 1.01 MG/DL (ref 0.57–1)
CREAT SERPL-MCNC: 1.07 MG/DL (ref 0.57–1)
CYTOLOGIST CVX/VAG CYTO: NORMAL
D-LACTATE SERPL-SCNC: 1.7 MMOL/L (ref 0.5–2)
D-LACTATE SERPL-SCNC: 2.5 MMOL/L (ref 0.5–2)
D-LACTATE SERPL-SCNC: 7.1 MMOL/L (ref 0.5–2)
DEPRECATED RDW RBC AUTO: 45.2 FL (ref 37–54)
DEPRECATED RDW RBC AUTO: 48.5 FL (ref 37–54)
DEPRECATED RDW RBC AUTO: 49.3 FL (ref 37–54)
EGFRCR SERPLBLD CKD-EPI 2021: 65.8 ML/MIN/1.73
EGFRCR SERPLBLD CKD-EPI 2021: 70.5 ML/MIN/1.73
EGFRCR SERPLBLD CKD-EPI 2021: 83.2 ML/MIN/1.73
EOSINOPHIL # BLD AUTO: 0 10*3/MM3 (ref 0–0.4)
EOSINOPHIL # BLD AUTO: 0 10*3/MM3 (ref 0–0.4)
EOSINOPHIL # BLD MANUAL: 0.26 10*3/MM3 (ref 0–0.4)
EOSINOPHIL NFR BLD AUTO: 0 % (ref 0.3–6.2)
EOSINOPHIL NFR BLD AUTO: 0 % (ref 0.3–6.2)
EOSINOPHIL NFR BLD MANUAL: 1.6 % (ref 0.3–6.2)
ERYTHROCYTE [DISTWIDTH] IN BLOOD BY AUTOMATED COUNT: 15.8 % (ref 12.3–15.4)
ERYTHROCYTE [DISTWIDTH] IN BLOOD BY AUTOMATED COUNT: 15.9 % (ref 12.3–15.4)
ERYTHROCYTE [DISTWIDTH] IN BLOOD BY AUTOMATED COUNT: 16.2 % (ref 12.3–15.4)
ETHANOL UR QL: <0.01 %
EXPIRATION DATE: NORMAL
FERRITIN SERPL-MCNC: 64.75 NG/ML (ref 13–150)
FOLATE SERPL-MCNC: 2.52 NG/ML (ref 4.78–24.2)
GLOBULIN UR ELPH-MCNC: 3.2 GM/DL
GLOBULIN UR ELPH-MCNC: 3.2 GM/DL
GLUCOSE BLDC GLUCOMTR-MCNC: 275 MG/DL (ref 70–130)
GLUCOSE SERPL-MCNC: 220 MG/DL (ref 65–99)
GLUCOSE SERPL-MCNC: 224 MG/DL (ref 65–99)
GLUCOSE SERPL-MCNC: 256 MG/DL (ref 65–99)
GLUCOSE UR STRIP-MCNC: ABNORMAL MG/DL
HBA1C MFR BLD: 6.4 % (ref 4.8–5.6)
HCO3 BLDA-SCNC: 15.7 MMOL/L (ref 20–26)
HCO3 BLDA-SCNC: 19.1 MMOL/L (ref 20–26)
HCO3 BLDA-SCNC: 19.5 MMOL/L (ref 20–26)
HCO3 BLDA-SCNC: 20.8 MMOL/L (ref 20–26)
HCT VFR BLD AUTO: 32.4 % (ref 34–46.6)
HCT VFR BLD AUTO: 33.1 % (ref 34–46.6)
HCT VFR BLD AUTO: 34.1 % (ref 34–46.6)
HDLC SERPL-MCNC: 27 MG/DL (ref 40–60)
HGB BLD-MCNC: 9.8 G/DL (ref 12–15.9)
HGB BLD-MCNC: 9.9 G/DL (ref 12–15.9)
HGB BLD-MCNC: 9.9 G/DL (ref 12–15.9)
HGB UR QL STRIP.AUTO: ABNORMAL
HOLD SPECIMEN: NORMAL
HOLD SPECIMEN: NORMAL
HYALINE CASTS UR QL AUTO: ABNORMAL /LPF
IMM GRANULOCYTES # BLD AUTO: 0.16 10*3/MM3 (ref 0–0.05)
IMM GRANULOCYTES # BLD AUTO: 0.33 10*3/MM3 (ref 0–0.05)
IMM GRANULOCYTES NFR BLD AUTO: 0.8 % (ref 0–0.5)
IMM GRANULOCYTES NFR BLD AUTO: 1.2 % (ref 0–0.5)
INHALED O2 CONCENTRATION: 30 %
INHALED O2 CONCENTRATION: 40 %
INHALED O2 CONCENTRATION: 65 %
INHALED O2 CONCENTRATION: 80 %
INR PPP: 1.29 (ref 0.91–1.09)
INTERNAL NEGATIVE CONTROL: NEGATIVE
INTERNAL POSITIVE CONTROL: POSITIVE
IRON 24H UR-MRATE: 40 MCG/DL (ref 37–145)
IRON SATN MFR SERPL: 10 % (ref 20–50)
KETONES UR QL STRIP: NEGATIVE
LDLC SERPL CALC-MCNC: 98 MG/DL (ref 0–100)
LDLC/HDLC SERPL: 3.62 {RATIO}
LEFT ATRIUM VOLUME INDEX: 19.4 ML/M2
LEFT ATRIUM VOLUME: 40.2 ML
LEUKOCYTE ESTERASE UR QL STRIP.AUTO: NEGATIVE
LIPASE SERPL-CCNC: 28 U/L (ref 13–60)
LYMPHOCYTES # BLD AUTO: 1.01 10*3/MM3 (ref 0.7–3.1)
LYMPHOCYTES # BLD AUTO: 1.16 10*3/MM3 (ref 0.7–3.1)
LYMPHOCYTES # BLD MANUAL: 7.31 10*3/MM3 (ref 0.7–3.1)
LYMPHOCYTES NFR BLD AUTO: 3.6 % (ref 19.6–45.3)
LYMPHOCYTES NFR BLD AUTO: 5.5 % (ref 19.6–45.3)
LYMPHOCYTES NFR BLD MANUAL: 3.1 % (ref 5–12)
Lab: ABNORMAL
Lab: NORMAL
MAGNESIUM SERPL-MCNC: 2 MG/DL (ref 1.6–2.6)
MAGNESIUM SERPL-MCNC: 2.5 MG/DL (ref 1.6–2.6)
MAXIMAL PREDICTED HEART RATE: 176 BPM
MCH RBC QN AUTO: 24.2 PG (ref 26.6–33)
MCH RBC QN AUTO: 24.3 PG (ref 26.6–33)
MCH RBC QN AUTO: 24.5 PG (ref 26.6–33)
MCHC RBC AUTO-ENTMCNC: 28.7 G/DL (ref 31.5–35.7)
MCHC RBC AUTO-ENTMCNC: 29.9 G/DL (ref 31.5–35.7)
MCHC RBC AUTO-ENTMCNC: 30.6 G/DL (ref 31.5–35.7)
MCV RBC AUTO: 79.2 FL (ref 79–97)
MCV RBC AUTO: 81.3 FL (ref 79–97)
MCV RBC AUTO: 85.3 FL (ref 79–97)
METAMYELOCYTES NFR BLD MANUAL: 3.9 % (ref 0–0)
METHADONE UR QL SCN: NEGATIVE
MICROCYTES BLD QL: ABNORMAL
MODALITY: ABNORMAL
MONOCYTES # BLD AUTO: 0.29 10*3/MM3 (ref 0.1–0.9)
MONOCYTES # BLD AUTO: 0.92 10*3/MM3 (ref 0.1–0.9)
MONOCYTES # BLD: 0.51 10*3/MM3 (ref 0.1–0.9)
MONOCYTES NFR BLD AUTO: 1.4 % (ref 5–12)
MONOCYTES NFR BLD AUTO: 3.3 % (ref 5–12)
MYELOCYTES NFR BLD MANUAL: 1.6 % (ref 0–0)
NEUTROPHILS # BLD AUTO: 7.05 10*3/MM3 (ref 1.7–7)
NEUTROPHILS NFR BLD AUTO: 19.59 10*3/MM3 (ref 1.7–7)
NEUTROPHILS NFR BLD AUTO: 25.62 10*3/MM3 (ref 1.7–7)
NEUTROPHILS NFR BLD AUTO: 91.8 % (ref 42.7–76)
NEUTROPHILS NFR BLD AUTO: 92.1 % (ref 42.7–76)
NEUTROPHILS NFR BLD MANUAL: 35.2 % (ref 42.7–76)
NEUTS BAND NFR BLD MANUAL: 7.8 % (ref 0–5)
NITRITE UR QL STRIP: NEGATIVE
NOTIFIED BY: ABNORMAL
NOTIFIED WHO: ABNORMAL
NRBC BLD AUTO-RTO: 0 /100 WBC (ref 0–0.2)
NRBC BLD AUTO-RTO: 0 /100 WBC (ref 0–0.2)
NRBC SPEC MANUAL: 2.3 /100 WBC (ref 0–0.2)
NT-PROBNP SERPL-MCNC: ABNORMAL PG/ML (ref 0–450)
OPIATES UR QL: NEGATIVE
OVALOCYTES BLD QL SMEAR: ABNORMAL
OXYCODONE UR QL SCN: NEGATIVE
PATH INTERP BLD-IMP: NORMAL
PCO2 BLDA: 27.2 MM HG (ref 35–45)
PCO2 BLDA: 31.8 MM HG (ref 35–45)
PCO2 BLDA: 43.6 MM HG (ref 35–45)
PCO2 BLDA: 43.8 MM HG (ref 35–45)
PCO2 TEMP ADJ BLD: 27.2 MM HG (ref 35–45)
PCO2 TEMP ADJ BLD: 31.8 MM HG (ref 35–45)
PCO2 TEMP ADJ BLD: 43.6 MM HG (ref 35–45)
PCO2 TEMP ADJ BLD: 43.8 MM HG (ref 35–45)
PCP UR QL SCN: NEGATIVE
PEEP RESPIRATORY: 5 CM[H2O]
PEEP RESPIRATORY: 8 CM[H2O]
PH BLDA: 7.16 PH UNITS (ref 7.35–7.45)
PH BLDA: 7.25 PH UNITS (ref 7.35–7.45)
PH BLDA: 7.42 PH UNITS (ref 7.35–7.45)
PH BLDA: 7.46 PH UNITS (ref 7.35–7.45)
PH UR STRIP.AUTO: 7 [PH] (ref 5–8)
PH, TEMP CORRECTED: 7.16 PH UNITS (ref 7.35–7.45)
PH, TEMP CORRECTED: 7.25 PH UNITS (ref 7.35–7.45)
PH, TEMP CORRECTED: 7.42 PH UNITS (ref 7.35–7.45)
PH, TEMP CORRECTED: 7.46 PH UNITS (ref 7.35–7.45)
PLATELET # BLD AUTO: 247 10*3/MM3 (ref 140–450)
PLATELET # BLD AUTO: 259 10*3/MM3 (ref 140–450)
PLATELET # BLD AUTO: 279 10*3/MM3 (ref 140–450)
PMV BLD AUTO: 10.2 FL (ref 6–12)
PMV BLD AUTO: 10.5 FL (ref 6–12)
PMV BLD AUTO: 10.7 FL (ref 6–12)
PO2 BLDA: 122 MM HG (ref 83–108)
PO2 BLDA: 66 MM HG (ref 83–108)
PO2 BLDA: 67.8 MM HG (ref 83–108)
PO2 BLDA: 81.1 MM HG (ref 83–108)
PO2 TEMP ADJ BLD: 122 MM HG (ref 83–108)
PO2 TEMP ADJ BLD: 66 MM HG (ref 83–108)
PO2 TEMP ADJ BLD: 67.8 MM HG (ref 83–108)
PO2 TEMP ADJ BLD: 81.1 MM HG (ref 83–108)
POIKILOCYTOSIS BLD QL SMEAR: ABNORMAL
POLYCHROMASIA BLD QL SMEAR: ABNORMAL
POTASSIUM SERPL-SCNC: 3.1 MMOL/L (ref 3.5–5.2)
POTASSIUM SERPL-SCNC: 3.5 MMOL/L (ref 3.5–5.2)
POTASSIUM SERPL-SCNC: 3.6 MMOL/L (ref 3.5–5.2)
POTASSIUM SERPL-SCNC: 3.6 MMOL/L (ref 3.5–5.2)
PROCALCITONIN SERPL-MCNC: 0.05 NG/ML (ref 0–0.25)
PROPOXYPH UR QL: NEGATIVE
PROT SERPL-MCNC: 6.1 G/DL (ref 6–8.5)
PROT SERPL-MCNC: 6.6 G/DL (ref 6–8.5)
PROT UR QL STRIP: ABNORMAL
PROTHROMBIN TIME: 15.6 SECONDS (ref 11.9–14.6)
QT INTERVAL: 384 MS
QTC INTERVAL: 487 MS
RBC # BLD AUTO: 4 10*6/MM3 (ref 3.77–5.28)
RBC # BLD AUTO: 4.07 10*6/MM3 (ref 3.77–5.28)
RBC # BLD AUTO: 4.09 10*6/MM3 (ref 3.77–5.28)
RBC # UR STRIP: ABNORMAL /HPF
REF LAB TEST METHOD: ABNORMAL
SAO2 % BLDCOA: 86.6 % (ref 94–99)
SAO2 % BLDCOA: 95.7 % (ref 94–99)
SAO2 % BLDCOA: 97.2 % (ref 94–99)
SAO2 % BLDCOA: 98.8 % (ref 94–99)
SET MECH RESP RATE: 16
SET MECH RESP RATE: 20
SMALL PLATELETS BLD QL SMEAR: ADEQUATE
SODIUM SERPL-SCNC: 135 MMOL/L (ref 136–145)
SODIUM SERPL-SCNC: 136 MMOL/L (ref 136–145)
SODIUM SERPL-SCNC: 137 MMOL/L (ref 136–145)
SP GR UR STRIP: 1.01 (ref 1–1.03)
SQUAMOUS #/AREA URNS HPF: ABNORMAL /HPF
STRESS TARGET HR: 150 BPM
T3FREE SERPL-MCNC: 6.26 PG/ML (ref 2–4.4)
T4 FREE SERPL-MCNC: 0.76 NG/DL (ref 0.93–1.7)
TIBC SERPL-MCNC: 381 MCG/DL (ref 298–536)
TRANSFERRIN SERPL-MCNC: 256 MG/DL (ref 200–360)
TRICYCLICS UR QL SCN: NEGATIVE
TRIGL SERPL-MCNC: 81 MG/DL (ref 0–150)
TROPONIN T SERPL-MCNC: 0.02 NG/ML (ref 0–0.03)
TROPONIN T SERPL-MCNC: 0.28 NG/ML (ref 0–0.03)
TROPONIN T SERPL-MCNC: 0.3 NG/ML (ref 0–0.03)
TSH SERPL DL<=0.05 MIU/L-ACNC: 41.48 UIU/ML (ref 0.27–4.2)
UROBILINOGEN UR QL STRIP: ABNORMAL
VARIANT LYMPHS NFR BLD MANUAL: 0.8 % (ref 0–5)
VARIANT LYMPHS NFR BLD MANUAL: 43.8 % (ref 19.6–45.3)
VENTILATOR MODE: AC
VIT B12 BLD-MCNC: 267 PG/ML (ref 211–946)
VLDLC SERPL-MCNC: 16 MG/DL (ref 5–40)
VT ON VENT VENT: 500 ML
WBC # UR STRIP: ABNORMAL /HPF
WBC MORPH BLD: NORMAL
WBC NRBC COR # BLD: 16.4 10*3/MM3 (ref 3.4–10.8)
WBC NRBC COR # BLD: 21.24 10*3/MM3 (ref 3.4–10.8)
WBC NRBC COR # BLD: 27.92 10*3/MM3 (ref 3.4–10.8)
WHOLE BLOOD HOLD COAG: NORMAL
WHOLE BLOOD HOLD SPECIMEN: NORMAL

## 2022-01-01 PROCEDURE — 36415 COLL VENOUS BLD VENIPUNCTURE: CPT | Performed by: EMERGENCY MEDICINE

## 2022-01-01 PROCEDURE — 25010000002 FUROSEMIDE PER 20 MG: Performed by: INTERNAL MEDICINE

## 2022-01-01 PROCEDURE — 80048 BASIC METABOLIC PNL TOTAL CA: CPT | Performed by: INTERNAL MEDICINE

## 2022-01-01 PROCEDURE — 0 LEVETIRACETAM IN NACL 0.75% 1000 MG/100ML SOLUTION: Performed by: INTERNAL MEDICINE

## 2022-01-01 PROCEDURE — 94799 UNLISTED PULMONARY SVC/PX: CPT

## 2022-01-01 PROCEDURE — 83880 ASSAY OF NATRIURETIC PEPTIDE: CPT | Performed by: EMERGENCY MEDICINE

## 2022-01-01 PROCEDURE — 85730 THROMBOPLASTIN TIME PARTIAL: CPT | Performed by: EMERGENCY MEDICINE

## 2022-01-01 PROCEDURE — 83735 ASSAY OF MAGNESIUM: CPT | Performed by: INTERNAL MEDICINE

## 2022-01-01 PROCEDURE — 25010000002 AMIODARONE IN DEXTROSE 5% 150-4.21 MG/100ML-% SOLUTION: Performed by: INTERNAL MEDICINE

## 2022-01-01 PROCEDURE — 84484 ASSAY OF TROPONIN QUANT: CPT | Performed by: EMERGENCY MEDICINE

## 2022-01-01 PROCEDURE — 99233 SBSQ HOSP IP/OBS HIGH 50: CPT | Performed by: INTERNAL MEDICINE

## 2022-01-01 PROCEDURE — 84145 PROCALCITONIN (PCT): CPT | Performed by: EMERGENCY MEDICINE

## 2022-01-01 PROCEDURE — 82962 GLUCOSE BLOOD TEST: CPT

## 2022-01-01 PROCEDURE — 71275 CT ANGIOGRAPHY CHEST: CPT

## 2022-01-01 PROCEDURE — 70450 CT HEAD/BRAIN W/O DYE: CPT

## 2022-01-01 PROCEDURE — 83690 ASSAY OF LIPASE: CPT | Performed by: EMERGENCY MEDICINE

## 2022-01-01 PROCEDURE — 99222 1ST HOSP IP/OBS MODERATE 55: CPT | Performed by: INTERNAL MEDICINE

## 2022-01-01 PROCEDURE — 0 LEVETIRACETAM IN NACL 0.75% 1000 MG/100ML SOLUTION: Performed by: PSYCHIATRY & NEUROLOGY

## 2022-01-01 PROCEDURE — 99291 CRITICAL CARE FIRST HOUR: CPT

## 2022-01-01 PROCEDURE — 82803 BLOOD GASES ANY COMBINATION: CPT

## 2022-01-01 PROCEDURE — 0BH17EZ INSERTION OF ENDOTRACHEAL AIRWAY INTO TRACHEA, VIA NATURAL OR ARTIFICIAL OPENING: ICD-10-PCS | Performed by: EMERGENCY MEDICINE

## 2022-01-01 PROCEDURE — 93306 TTE W/DOPPLER COMPLETE: CPT

## 2022-01-01 PROCEDURE — 25010000002 PIPERACILLIN SOD-TAZOBACTAM PER 1 G: Performed by: INTERNAL MEDICINE

## 2022-01-01 PROCEDURE — 93010 ELECTROCARDIOGRAM REPORT: CPT | Performed by: INTERNAL MEDICINE

## 2022-01-01 PROCEDURE — 84466 ASSAY OF TRANSFERRIN: CPT | Performed by: PSYCHIATRY & NEUROLOGY

## 2022-01-01 PROCEDURE — 25010000002 LORAZEPAM PER 2 MG: Performed by: INTERNAL MEDICINE

## 2022-01-01 PROCEDURE — C1751 CATH, INF, PER/CENT/MIDLINE: HCPCS

## 2022-01-01 PROCEDURE — 0 POTASSIUM CHLORIDE 10 MEQ/100ML SOLUTION: Performed by: INTERNAL MEDICINE

## 2022-01-01 PROCEDURE — 92950 HEART/LUNG RESUSCITATION CPR: CPT

## 2022-01-01 PROCEDURE — 93306 TTE W/DOPPLER COMPLETE: CPT | Performed by: INTERNAL MEDICINE

## 2022-01-01 PROCEDURE — 71045 X-RAY EXAM CHEST 1 VIEW: CPT

## 2022-01-01 PROCEDURE — 99233 SBSQ HOSP IP/OBS HIGH 50: CPT | Performed by: PHYSICIAN ASSISTANT

## 2022-01-01 PROCEDURE — 95720 EEG PHY/QHP EA INCR W/VEEG: CPT | Performed by: PSYCHIATRY & NEUROLOGY

## 2022-01-01 PROCEDURE — 25010000002 ENOXAPARIN PER 10 MG: Performed by: NURSE PRACTITIONER

## 2022-01-01 PROCEDURE — 82077 ASSAY SPEC XCP UR&BREATH IA: CPT | Performed by: EMERGENCY MEDICINE

## 2022-01-01 PROCEDURE — 85610 PROTHROMBIN TIME: CPT | Performed by: EMERGENCY MEDICINE

## 2022-01-01 PROCEDURE — 93005 ELECTROCARDIOGRAM TRACING: CPT | Performed by: INTERNAL MEDICINE

## 2022-01-01 PROCEDURE — 94002 VENT MGMT INPAT INIT DAY: CPT

## 2022-01-01 PROCEDURE — 85025 COMPLETE CBC W/AUTO DIFF WBC: CPT | Performed by: INTERNAL MEDICINE

## 2022-01-01 PROCEDURE — 25010000002 PROPOFOL 1000 MG/100ML EMULSION

## 2022-01-01 PROCEDURE — 25010000002 PERFLUTREN 6.52 MG/ML SUSPENSION: Performed by: INTERNAL MEDICINE

## 2022-01-01 PROCEDURE — 25010000002 THIAMINE PER 100 MG: Performed by: PSYCHIATRY & NEUROLOGY

## 2022-01-01 PROCEDURE — 87070 CULTURE OTHR SPECIMN AEROBIC: CPT | Performed by: NURSE PRACTITIONER

## 2022-01-01 PROCEDURE — 99223 1ST HOSP IP/OBS HIGH 75: CPT | Performed by: CLINICAL NURSE SPECIALIST

## 2022-01-01 PROCEDURE — 25010000002 CEFTRIAXONE PER 250 MG: Performed by: INTERNAL MEDICINE

## 2022-01-01 PROCEDURE — 84132 ASSAY OF SERUM POTASSIUM: CPT | Performed by: INTERNAL MEDICINE

## 2022-01-01 PROCEDURE — 25010000002 PROPOFOL 10 MG/ML EMULSION: Performed by: INTERNAL MEDICINE

## 2022-01-01 PROCEDURE — 99232 SBSQ HOSP IP/OBS MODERATE 35: CPT | Performed by: INTERNAL MEDICINE

## 2022-01-01 PROCEDURE — 25010000002 EPINEPHRINE 1 MG/10ML SOLUTION PREFILLED SYRINGE: Performed by: EMERGENCY MEDICINE

## 2022-01-01 PROCEDURE — 85060 BLOOD SMEAR INTERPRETATION: CPT | Performed by: EMERGENCY MEDICINE

## 2022-01-01 PROCEDURE — 83036 HEMOGLOBIN GLYCOSYLATED A1C: CPT | Performed by: PSYCHIATRY & NEUROLOGY

## 2022-01-01 PROCEDURE — 84481 FREE ASSAY (FT-3): CPT | Performed by: PSYCHIATRY & NEUROLOGY

## 2022-01-01 PROCEDURE — 0 IOPAMIDOL PER 1 ML: Performed by: EMERGENCY MEDICINE

## 2022-01-01 PROCEDURE — 36415 COLL VENOUS BLD VENIPUNCTURE: CPT

## 2022-01-01 PROCEDURE — 81001 URINALYSIS AUTO W/SCOPE: CPT | Performed by: EMERGENCY MEDICINE

## 2022-01-01 PROCEDURE — 5A2204Z RESTORATION OF CARDIAC RHYTHM, SINGLE: ICD-10-PCS | Performed by: INTERNAL MEDICINE

## 2022-01-01 PROCEDURE — 87040 BLOOD CULTURE FOR BACTERIA: CPT | Performed by: EMERGENCY MEDICINE

## 2022-01-01 PROCEDURE — 99291 CRITICAL CARE FIRST HOUR: CPT | Performed by: PSYCHIATRY & NEUROLOGY

## 2022-01-01 PROCEDURE — 03HC33Z INSERTION OF INFUSION DEVICE INTO LEFT RADIAL ARTERY, PERCUTANEOUS APPROACH: ICD-10-PCS | Performed by: EMERGENCY MEDICINE

## 2022-01-01 PROCEDURE — 25010000002 AMIODARONE IN DEXTROSE 5% 360-4.14 MG/200ML-% SOLUTION: Performed by: INTERNAL MEDICINE

## 2022-01-01 PROCEDURE — A9577 INJ MULTIHANCE: HCPCS | Performed by: INTERNAL MEDICINE

## 2022-01-01 PROCEDURE — 94664 DEMO&/EVAL PT USE INHALER: CPT

## 2022-01-01 PROCEDURE — 83540 ASSAY OF IRON: CPT | Performed by: PSYCHIATRY & NEUROLOGY

## 2022-01-01 PROCEDURE — 25010000002 VANCOMYCIN 10 G RECONSTITUTED SOLUTION: Performed by: INTERNAL MEDICINE

## 2022-01-01 PROCEDURE — 82728 ASSAY OF FERRITIN: CPT | Performed by: PSYCHIATRY & NEUROLOGY

## 2022-01-01 PROCEDURE — 25010000002 METHYLPREDNISOLONE PER 40 MG: Performed by: NURSE PRACTITIONER

## 2022-01-01 PROCEDURE — 80061 LIPID PANEL: CPT | Performed by: PSYCHIATRY & NEUROLOGY

## 2022-01-01 PROCEDURE — 80053 COMPREHEN METABOLIC PANEL: CPT | Performed by: INTERNAL MEDICINE

## 2022-01-01 PROCEDURE — 5A12012 PERFORMANCE OF CARDIAC OUTPUT, SINGLE, MANUAL: ICD-10-PCS | Performed by: EMERGENCY MEDICINE

## 2022-01-01 PROCEDURE — 25010000002 PROPOFOL 10 MG/ML EMULSION: Performed by: EMERGENCY MEDICINE

## 2022-01-01 PROCEDURE — 94003 VENT MGMT INPAT SUBQ DAY: CPT

## 2022-01-01 PROCEDURE — 80306 DRUG TEST PRSMV INSTRMNT: CPT | Performed by: EMERGENCY MEDICINE

## 2022-01-01 PROCEDURE — 80050 GENERAL HEALTH PANEL: CPT | Performed by: EMERGENCY MEDICINE

## 2022-01-01 PROCEDURE — 70553 MRI BRAIN STEM W/O & W/DYE: CPT

## 2022-01-01 PROCEDURE — 81025 URINE PREGNANCY TEST: CPT | Performed by: EMERGENCY MEDICINE

## 2022-01-01 PROCEDURE — 0 GADOBENATE DIMEGLUMINE 529 MG/ML SOLUTION: Performed by: INTERNAL MEDICINE

## 2022-01-01 PROCEDURE — 85007 BL SMEAR W/DIFF WBC COUNT: CPT | Performed by: EMERGENCY MEDICINE

## 2022-01-01 PROCEDURE — 96373 THER/PROPH/DIAG INJ IA: CPT

## 2022-01-01 PROCEDURE — 84439 ASSAY OF FREE THYROXINE: CPT | Performed by: PSYCHIATRY & NEUROLOGY

## 2022-01-01 PROCEDURE — P9612 CATHETERIZE FOR URINE SPEC: HCPCS

## 2022-01-01 PROCEDURE — 95716 VEEG EA 12-26HR CONT MNTR: CPT

## 2022-01-01 PROCEDURE — 94761 N-INVAS EAR/PLS OXIMETRY MLT: CPT

## 2022-01-01 PROCEDURE — 87040 BLOOD CULTURE FOR BACTERIA: CPT | Performed by: FAMILY MEDICINE

## 2022-01-01 PROCEDURE — 83605 ASSAY OF LACTIC ACID: CPT | Performed by: EMERGENCY MEDICINE

## 2022-01-01 PROCEDURE — 31500 INSERT EMERGENCY AIRWAY: CPT

## 2022-01-01 PROCEDURE — 25010000002 LORAZEPAM PER 2 MG

## 2022-01-01 PROCEDURE — 99223 1ST HOSP IP/OBS HIGH 75: CPT | Performed by: INTERNAL MEDICINE

## 2022-01-01 PROCEDURE — 0 POTASSIUM CHLORIDE PER 2 MEQ: Performed by: NURSE PRACTITIONER

## 2022-01-01 PROCEDURE — 25010000002 FENTANYL CITRATE (PF) 50 MCG/ML SOLUTION: Performed by: INTERNAL MEDICINE

## 2022-01-01 PROCEDURE — 82550 ASSAY OF CK (CPK): CPT | Performed by: INTERNAL MEDICINE

## 2022-01-01 PROCEDURE — 02HV33Z INSERTION OF INFUSION DEVICE INTO SUPERIOR VENA CAVA, PERCUTANEOUS APPROACH: ICD-10-PCS | Performed by: EMERGENCY MEDICINE

## 2022-01-01 PROCEDURE — 82746 ASSAY OF FOLIC ACID SERUM: CPT | Performed by: PSYCHIATRY & NEUROLOGY

## 2022-01-01 PROCEDURE — 99497 ADVNCD CARE PLAN 30 MIN: CPT | Performed by: CLINICAL NURSE SPECIALIST

## 2022-01-01 PROCEDURE — 36600 WITHDRAWAL OF ARTERIAL BLOOD: CPT

## 2022-01-01 PROCEDURE — 74018 RADEX ABDOMEN 1 VIEW: CPT

## 2022-01-01 PROCEDURE — 84484 ASSAY OF TROPONIN QUANT: CPT | Performed by: INTERNAL MEDICINE

## 2022-01-01 PROCEDURE — 82607 VITAMIN B-12: CPT | Performed by: PSYCHIATRY & NEUROLOGY

## 2022-01-01 PROCEDURE — 5A1945Z RESPIRATORY VENTILATION, 24-96 CONSECUTIVE HOURS: ICD-10-PCS | Performed by: INTERNAL MEDICINE

## 2022-01-01 PROCEDURE — 87205 SMEAR GRAM STAIN: CPT | Performed by: NURSE PRACTITIONER

## 2022-01-01 RX ORDER — FUROSEMIDE 10 MG/ML
40 INJECTION INTRAMUSCULAR; INTRAVENOUS ONCE
Status: DISCONTINUED | OUTPATIENT
Start: 2022-01-01 | End: 2022-01-01

## 2022-01-01 RX ORDER — SODIUM CHLORIDE 0.9 % (FLUSH) 0.9 %
10 SYRINGE (ML) INJECTION AS NEEDED
Status: DISCONTINUED | OUTPATIENT
Start: 2022-01-01 | End: 2022-01-01 | Stop reason: HOSPADM

## 2022-01-01 RX ORDER — LORAZEPAM 2 MG/ML
1 INJECTION INTRAMUSCULAR EVERY 4 HOURS PRN
Status: DISCONTINUED | OUTPATIENT
Start: 2022-01-01 | End: 2022-01-01 | Stop reason: HOSPADM

## 2022-01-01 RX ORDER — FUROSEMIDE 10 MG/ML
40 INJECTION INTRAMUSCULAR; INTRAVENOUS EVERY 12 HOURS
Status: DISCONTINUED | OUTPATIENT
Start: 2022-01-01 | End: 2022-01-01 | Stop reason: HOSPADM

## 2022-01-01 RX ORDER — MAGNESIUM SULFATE HEPTAHYDRATE 40 MG/ML
4 INJECTION, SOLUTION INTRAVENOUS AS NEEDED
Status: DISCONTINUED | OUTPATIENT
Start: 2022-01-01 | End: 2022-01-01 | Stop reason: HOSPADM

## 2022-01-01 RX ORDER — FAMOTIDINE 10 MG/ML
20 INJECTION, SOLUTION INTRAVENOUS EVERY 12 HOURS SCHEDULED
Status: DISCONTINUED | OUTPATIENT
Start: 2022-01-01 | End: 2022-01-01 | Stop reason: HOSPADM

## 2022-01-01 RX ORDER — POTASSIUM CHLORIDE 29.8 MG/ML
20 INJECTION INTRAVENOUS
Status: DISCONTINUED | OUTPATIENT
Start: 2022-01-01 | End: 2022-01-01 | Stop reason: HOSPADM

## 2022-01-01 RX ORDER — ENOXAPARIN SODIUM 100 MG/ML
40 INJECTION SUBCUTANEOUS DAILY
Status: DISCONTINUED | OUTPATIENT
Start: 2022-01-01 | End: 2022-01-01 | Stop reason: HOSPADM

## 2022-01-01 RX ORDER — EPINEPHRINE 0.1 MG/ML
SYRINGE (ML) INJECTION
Status: COMPLETED | OUTPATIENT
Start: 2022-01-01 | End: 2022-01-01

## 2022-01-01 RX ORDER — LORAZEPAM 2 MG/ML
INJECTION INTRAMUSCULAR
Status: COMPLETED
Start: 2022-01-01 | End: 2022-01-01

## 2022-01-01 RX ORDER — FENTANYL CITRATE 50 UG/ML
25 INJECTION, SOLUTION INTRAMUSCULAR; INTRAVENOUS ONCE
Status: COMPLETED | OUTPATIENT
Start: 2022-01-01 | End: 2022-01-01

## 2022-01-01 RX ORDER — LORAZEPAM 2 MG/ML
1 INJECTION INTRAMUSCULAR EVERY 4 HOURS PRN
Status: DISCONTINUED | OUTPATIENT
Start: 2022-01-01 | End: 2022-01-01 | Stop reason: SDUPTHER

## 2022-01-01 RX ORDER — NOREPINEPHRINE BIT/0.9 % NACL 8 MG/250ML
.02-.3 INFUSION BOTTLE (ML) INTRAVENOUS
Status: DISCONTINUED | OUTPATIENT
Start: 2022-01-01 | End: 2022-01-01 | Stop reason: HOSPADM

## 2022-01-01 RX ORDER — CHLORHEXIDINE GLUCONATE 500 MG/1
1 CLOTH TOPICAL ONCE
Status: COMPLETED | OUTPATIENT
Start: 2022-01-01 | End: 2022-01-01

## 2022-01-01 RX ORDER — SODIUM CHLORIDE 9 MG/ML
75 INJECTION, SOLUTION INTRAVENOUS CONTINUOUS
Status: DISPENSED | OUTPATIENT
Start: 2022-01-01 | End: 2022-01-01

## 2022-01-01 RX ORDER — SODIUM CHLORIDE 0.9 % (FLUSH) 0.9 %
10 SYRINGE (ML) INJECTION EVERY 12 HOURS SCHEDULED
Status: DISCONTINUED | OUTPATIENT
Start: 2022-01-01 | End: 2022-01-01 | Stop reason: HOSPADM

## 2022-01-01 RX ORDER — PROPOFOL 10 MG/ML
INJECTION, EMULSION INTRAVENOUS
Status: COMPLETED
Start: 2022-01-01 | End: 2022-01-01

## 2022-01-01 RX ORDER — LORAZEPAM 2 MG/ML
1 INJECTION INTRAMUSCULAR ONCE
Status: COMPLETED | OUTPATIENT
Start: 2022-01-01 | End: 2022-01-01

## 2022-01-01 RX ORDER — OMEPRAZOLE 20 MG/1
20 CAPSULE, DELAYED RELEASE ORAL DAILY
COMMUNITY

## 2022-01-01 RX ORDER — ACETAMINOPHEN 500 MG
500 TABLET ORAL EVERY 6 HOURS PRN
COMMUNITY

## 2022-01-01 RX ORDER — METHYLPREDNISOLONE SODIUM SUCCINATE 40 MG/ML
40 INJECTION, POWDER, LYOPHILIZED, FOR SOLUTION INTRAMUSCULAR; INTRAVENOUS EVERY 8 HOURS
Status: DISCONTINUED | OUTPATIENT
Start: 2022-01-01 | End: 2022-01-01

## 2022-01-01 RX ORDER — LEVETIRACETAM 10 MG/ML
1000 INJECTION INTRAVASCULAR EVERY 12 HOURS SCHEDULED
Status: DISCONTINUED | OUTPATIENT
Start: 2022-01-01 | End: 2022-01-01 | Stop reason: HOSPADM

## 2022-01-01 RX ORDER — FUROSEMIDE 10 MG/ML
20 INJECTION INTRAMUSCULAR; INTRAVENOUS EVERY 12 HOURS
Status: DISCONTINUED | OUTPATIENT
Start: 2022-01-01 | End: 2022-01-01

## 2022-01-01 RX ORDER — LEVETIRACETAM 10 MG/ML
1000 INJECTION INTRAVASCULAR ONCE
Status: COMPLETED | OUTPATIENT
Start: 2022-01-01 | End: 2022-01-01

## 2022-01-01 RX ORDER — ONDANSETRON 2 MG/ML
4 INJECTION INTRAMUSCULAR; INTRAVENOUS EVERY 6 HOURS PRN
Status: DISCONTINUED | OUTPATIENT
Start: 2022-01-01 | End: 2022-01-01 | Stop reason: HOSPADM

## 2022-01-01 RX ORDER — CHLORHEXIDINE GLUCONATE 500 MG/1
1 CLOTH TOPICAL EVERY 24 HOURS
Status: DISCONTINUED | OUTPATIENT
Start: 2022-01-01 | End: 2022-01-01 | Stop reason: HOSPADM

## 2022-01-01 RX ORDER — IPRATROPIUM BROMIDE AND ALBUTEROL SULFATE 2.5; .5 MG/3ML; MG/3ML
3 SOLUTION RESPIRATORY (INHALATION)
Status: DISCONTINUED | OUTPATIENT
Start: 2022-01-01 | End: 2022-01-01 | Stop reason: HOSPADM

## 2022-01-01 RX ORDER — MAGNESIUM SULFATE HEPTAHYDRATE 40 MG/ML
2 INJECTION, SOLUTION INTRAVENOUS AS NEEDED
Status: DISCONTINUED | OUTPATIENT
Start: 2022-01-01 | End: 2022-01-01 | Stop reason: HOSPADM

## 2022-01-01 RX ORDER — BUDESONIDE 0.5 MG/2ML
0.5 INHALANT ORAL
Status: DISCONTINUED | OUTPATIENT
Start: 2022-01-01 | End: 2022-01-01 | Stop reason: HOSPADM

## 2022-01-01 RX ORDER — POTASSIUM CHLORIDE 7.45 MG/ML
10 INJECTION INTRAVENOUS
Status: COMPLETED | OUTPATIENT
Start: 2022-01-01 | End: 2022-01-01

## 2022-01-01 RX ORDER — METHYLPREDNISOLONE SODIUM SUCCINATE 40 MG/ML
40 INJECTION, POWDER, LYOPHILIZED, FOR SOLUTION INTRAMUSCULAR; INTRAVENOUS EVERY 12 HOURS
Status: DISCONTINUED | OUTPATIENT
Start: 2022-01-01 | End: 2022-01-01 | Stop reason: HOSPADM

## 2022-01-01 RX ORDER — ACETAMINOPHEN 650 MG/1
650 SUPPOSITORY RECTAL EVERY 4 HOURS PRN
Status: DISCONTINUED | OUTPATIENT
Start: 2022-01-01 | End: 2022-01-01 | Stop reason: HOSPADM

## 2022-01-01 RX ORDER — FUROSEMIDE 10 MG/ML
60 INJECTION INTRAMUSCULAR; INTRAVENOUS ONCE
Status: COMPLETED | OUTPATIENT
Start: 2022-01-01 | End: 2022-01-01

## 2022-01-01 RX ORDER — VECURONIUM BROMIDE 1 MG/ML
100 INJECTION, POWDER, LYOPHILIZED, FOR SOLUTION INTRAVENOUS ONCE
Status: COMPLETED | OUTPATIENT
Start: 2022-01-01 | End: 2022-01-01

## 2022-01-01 RX ORDER — LEVOTHYROXINE SODIUM ANHYDROUS 100 UG/5ML
88 INJECTION, POWDER, LYOPHILIZED, FOR SOLUTION INTRAVENOUS
Status: DISCONTINUED | OUTPATIENT
Start: 2022-01-01 | End: 2022-01-01 | Stop reason: HOSPADM

## 2022-01-01 RX ORDER — POTASSIUM CHLORIDE 1.5 G/1.77G
40 POWDER, FOR SOLUTION ORAL 2 TIMES DAILY
Status: DISCONTINUED | OUTPATIENT
Start: 2022-01-01 | End: 2022-01-01

## 2022-01-01 RX ADMIN — IPRATROPIUM BROMIDE AND ALBUTEROL SULFATE 3 ML: 2.5; .5 SOLUTION RESPIRATORY (INHALATION) at 06:33

## 2022-01-01 RX ADMIN — FENTANYL CITRATE 25 MCG: 50 INJECTION, SOLUTION INTRAMUSCULAR; INTRAVENOUS at 15:52

## 2022-01-01 RX ADMIN — MINERAL OIL, PETROLATUM: 425; 568 OINTMENT OPHTHALMIC at 03:40

## 2022-01-01 RX ADMIN — PROPOFOL 50 MCG/KG/MIN: 10 INJECTION, EMULSION INTRAVENOUS at 16:31

## 2022-01-01 RX ADMIN — METHYLPREDNISOLONE SODIUM SUCCINATE 40 MG: 40 INJECTION, POWDER, FOR SOLUTION INTRAMUSCULAR; INTRAVENOUS at 08:03

## 2022-01-01 RX ADMIN — ENOXAPARIN SODIUM 40 MG: 100 INJECTION SUBCUTANEOUS at 16:27

## 2022-01-01 RX ADMIN — Medication 10 ML: at 08:06

## 2022-01-01 RX ADMIN — BUDESONIDE 0.5 MG: 0.5 INHALANT RESPIRATORY (INHALATION) at 19:13

## 2022-01-01 RX ADMIN — MINERAL OIL, PETROLATUM: 425; 568 OINTMENT OPHTHALMIC at 04:20

## 2022-01-01 RX ADMIN — MUPIROCIN 1 APPLICATION: 20 OINTMENT TOPICAL at 21:49

## 2022-01-01 RX ADMIN — PROPOFOL 25 MCG/KG/MIN: 10 INJECTION, EMULSION INTRAVENOUS at 13:05

## 2022-01-01 RX ADMIN — PIPERACILLIN SODIUM AND TAZOBACTAM SODIUM 4.5 G: 4; .5 INJECTION, POWDER, LYOPHILIZED, FOR SOLUTION INTRAVENOUS at 04:20

## 2022-01-01 RX ADMIN — MINERAL OIL, PETROLATUM: 425; 568 OINTMENT OPHTHALMIC at 16:46

## 2022-01-01 RX ADMIN — METHYLPREDNISOLONE SODIUM SUCCINATE 40 MG: 40 INJECTION, POWDER, FOR SOLUTION INTRAMUSCULAR; INTRAVENOUS at 19:59

## 2022-01-01 RX ADMIN — PERFLUTREN 2 ML: 6.52 INJECTION, SUSPENSION INTRAVENOUS at 16:52

## 2022-01-01 RX ADMIN — MINERAL OIL, PETROLATUM: 425; 568 OINTMENT OPHTHALMIC at 20:00

## 2022-01-01 RX ADMIN — VECURONIUM BROMIDE 9.8 MG: 1 INJECTION, POWDER, LYOPHILIZED, FOR SOLUTION INTRAVENOUS at 15:49

## 2022-01-01 RX ADMIN — MINERAL OIL, PETROLATUM: 425; 568 OINTMENT OPHTHALMIC at 21:48

## 2022-01-01 RX ADMIN — FAMOTIDINE 20 MG: 10 INJECTION INTRAVENOUS at 20:00

## 2022-01-01 RX ADMIN — METHYLPREDNISOLONE SODIUM SUCCINATE 40 MG: 40 INJECTION, POWDER, FOR SOLUTION INTRAMUSCULAR; INTRAVENOUS at 00:09

## 2022-01-01 RX ADMIN — VANCOMYCIN HYDROCHLORIDE 1250 MG: 10 INJECTION, POWDER, LYOPHILIZED, FOR SOLUTION INTRAVENOUS at 06:22

## 2022-01-01 RX ADMIN — BUDESONIDE 0.5 MG: 0.5 INHALANT RESPIRATORY (INHALATION) at 06:39

## 2022-01-01 RX ADMIN — PROPOFOL 45 MCG/KG/MIN: 10 INJECTION, EMULSION INTRAVENOUS at 00:44

## 2022-01-01 RX ADMIN — FUROSEMIDE 60 MG: 10 INJECTION, SOLUTION INTRAMUSCULAR; INTRAVENOUS at 16:45

## 2022-01-01 RX ADMIN — GADOBENATE DIMEGLUMINE 20 ML: 529 INJECTION, SOLUTION INTRAVENOUS at 15:54

## 2022-01-01 RX ADMIN — POTASSIUM CHLORIDE 20 MEQ: 29.8 INJECTION, SOLUTION INTRAVENOUS at 07:37

## 2022-01-01 RX ADMIN — PIPERACILLIN SODIUM AND TAZOBACTAM SODIUM 4.5 G: 4; .5 INJECTION, POWDER, LYOPHILIZED, FOR SOLUTION INTRAVENOUS at 21:42

## 2022-01-01 RX ADMIN — MINERAL OIL, PETROLATUM: 425; 568 OINTMENT OPHTHALMIC at 00:10

## 2022-01-01 RX ADMIN — SODIUM CHLORIDE 75 ML/HR: 9 INJECTION, SOLUTION INTRAVENOUS at 23:17

## 2022-01-01 RX ADMIN — LEVETIRACETAM 1000 MG: 10 INJECTION INTRAVASCULAR at 20:00

## 2022-01-01 RX ADMIN — PIPERACILLIN SODIUM AND TAZOBACTAM SODIUM 4.5 G: 4; .5 INJECTION, POWDER, LYOPHILIZED, FOR SOLUTION INTRAVENOUS at 03:40

## 2022-01-01 RX ADMIN — POTASSIUM CHLORIDE 20 MEQ: 29.8 INJECTION, SOLUTION INTRAVENOUS at 08:24

## 2022-01-01 RX ADMIN — MINERAL OIL, PETROLATUM: 425; 568 OINTMENT OPHTHALMIC at 08:03

## 2022-01-01 RX ADMIN — CHLORHEXIDINE GLUCONATE 1 APPLICATION: 500 CLOTH TOPICAL at 04:32

## 2022-01-01 RX ADMIN — SODIUM CHLORIDE 500 ML: 9 INJECTION, SOLUTION INTRAVENOUS at 13:37

## 2022-01-01 RX ADMIN — LEVOTHYROXINE SODIUM ANHYDROUS 88 MCG: 100 INJECTION, POWDER, LYOPHILIZED, FOR SOLUTION INTRAVENOUS at 11:28

## 2022-01-01 RX ADMIN — METHYLPREDNISOLONE SODIUM SUCCINATE 40 MG: 40 INJECTION, POWDER, FOR SOLUTION INTRAMUSCULAR; INTRAVENOUS at 16:55

## 2022-01-01 RX ADMIN — IPRATROPIUM BROMIDE AND ALBUTEROL SULFATE 3 ML: 2.5; .5 SOLUTION RESPIRATORY (INHALATION) at 09:56

## 2022-01-01 RX ADMIN — MINERAL OIL, PETROLATUM: 425; 568 OINTMENT OPHTHALMIC at 23:17

## 2022-01-01 RX ADMIN — FUROSEMIDE 40 MG: 10 INJECTION, SOLUTION INTRAVENOUS at 08:45

## 2022-01-01 RX ADMIN — PROPOFOL 50 MCG/KG/MIN: 10 INJECTION, EMULSION INTRAVENOUS at 18:02

## 2022-01-01 RX ADMIN — LORAZEPAM 1 MG: 2 INJECTION INTRAMUSCULAR; INTRAVENOUS at 04:08

## 2022-01-01 RX ADMIN — LEVETIRACETAM 1000 MG: 10 INJECTION INTRAVASCULAR at 08:26

## 2022-01-01 RX ADMIN — AMIODARONE HYDROCHLORIDE 1 MG/MIN: 1.8 INJECTION, SOLUTION INTRAVENOUS at 08:17

## 2022-01-01 RX ADMIN — Medication 10 ML: at 20:01

## 2022-01-01 RX ADMIN — VANCOMYCIN HYDROCHLORIDE 1250 MG: 10 INJECTION, POWDER, LYOPHILIZED, FOR SOLUTION INTRAVENOUS at 18:20

## 2022-01-01 RX ADMIN — PROPOFOL 50 MCG/KG/MIN: 10 INJECTION, EMULSION INTRAVENOUS at 08:43

## 2022-01-01 RX ADMIN — LORAZEPAM 1 MG: 2 INJECTION INTRAMUSCULAR at 13:06

## 2022-01-01 RX ADMIN — IPRATROPIUM BROMIDE AND ALBUTEROL SULFATE 3 ML: 2.5; .5 SOLUTION RESPIRATORY (INHALATION) at 13:59

## 2022-01-01 RX ADMIN — EPINEPHRINE 1 MG: 0.1 INJECTION INTRACARDIAC; INTRAVENOUS at 12:03

## 2022-01-01 RX ADMIN — CHLORHEXIDINE GLUCONATE 1 APPLICATION: 500 CLOTH TOPICAL at 16:55

## 2022-01-01 RX ADMIN — FUROSEMIDE 40 MG: 10 INJECTION, SOLUTION INTRAVENOUS at 20:18

## 2022-01-01 RX ADMIN — BUDESONIDE 0.5 MG: 0.5 INHALANT RESPIRATORY (INHALATION) at 06:20

## 2022-01-01 RX ADMIN — AMIODARONE HYDROCHLORIDE 150 MG: 1.5 INJECTION, SOLUTION INTRAVENOUS at 08:25

## 2022-01-01 RX ADMIN — LORAZEPAM 1 MG: 2 INJECTION INTRAMUSCULAR; INTRAVENOUS at 08:24

## 2022-01-01 RX ADMIN — PROPOFOL 50 MCG/KG/MIN: 10 INJECTION, EMULSION INTRAVENOUS at 01:51

## 2022-01-01 RX ADMIN — MINERAL OIL, PETROLATUM: 425; 568 OINTMENT OPHTHALMIC at 16:27

## 2022-01-01 RX ADMIN — MUPIROCIN 1 APPLICATION: 20 OINTMENT TOPICAL at 20:01

## 2022-01-01 RX ADMIN — PROPOFOL 50 MCG/KG/MIN: 10 INJECTION, EMULSION INTRAVENOUS at 12:00

## 2022-01-01 RX ADMIN — POTASSIUM CHLORIDE 10 MEQ: 7.46 INJECTION, SOLUTION INTRAVENOUS at 16:56

## 2022-01-01 RX ADMIN — LEVETIRACETAM 1000 MG: 10 INJECTION INTRAVASCULAR at 21:47

## 2022-01-01 RX ADMIN — MUPIROCIN 1 APPLICATION: 20 OINTMENT TOPICAL at 08:03

## 2022-01-01 RX ADMIN — POTASSIUM CHLORIDE 10 MEQ: 7.46 INJECTION, SOLUTION INTRAVENOUS at 15:14

## 2022-01-01 RX ADMIN — LEVETIRACETAM 1000 MG: 10 INJECTION INTRAVASCULAR at 16:24

## 2022-01-01 RX ADMIN — CEFTRIAXONE 1 G: 1 INJECTION, POWDER, FOR SOLUTION INTRAMUSCULAR; INTRAVENOUS at 05:02

## 2022-01-01 RX ADMIN — PIPERACILLIN SODIUM AND TAZOBACTAM SODIUM 4.5 G: 4; .5 INJECTION, POWDER, LYOPHILIZED, FOR SOLUTION INTRAVENOUS at 11:29

## 2022-01-01 RX ADMIN — PROPOFOL 50 MCG/KG/MIN: 10 INJECTION, EMULSION INTRAVENOUS at 04:26

## 2022-01-01 RX ADMIN — PROPOFOL 50 MCG/KG/MIN: 10 INJECTION, EMULSION INTRAVENOUS at 21:24

## 2022-01-01 RX ADMIN — IOPAMIDOL 100 ML: 755 INJECTION, SOLUTION INTRAVENOUS at 13:55

## 2022-01-01 RX ADMIN — LEVETIRACETAM 1000 MG: 10 INJECTION INTRAVASCULAR at 08:03

## 2022-01-01 RX ADMIN — PIPERACILLIN SODIUM AND TAZOBACTAM SODIUM 4.5 G: 4; .5 INJECTION, POWDER, LYOPHILIZED, FOR SOLUTION INTRAVENOUS at 19:59

## 2022-01-01 RX ADMIN — LORAZEPAM 2 MG: 2 INJECTION INTRAMUSCULAR; INTRAVENOUS at 09:52

## 2022-01-01 RX ADMIN — PROPOFOL 50 MCG/KG/MIN: 10 INJECTION, EMULSION INTRAVENOUS at 20:19

## 2022-01-01 RX ADMIN — MUPIROCIN 1 APPLICATION: 20 OINTMENT TOPICAL at 16:58

## 2022-01-01 RX ADMIN — IPRATROPIUM BROMIDE AND ALBUTEROL SULFATE 3 ML: 2.5; .5 SOLUTION RESPIRATORY (INHALATION) at 19:26

## 2022-01-01 RX ADMIN — BUDESONIDE 0.5 MG: 0.5 INHALANT RESPIRATORY (INHALATION) at 19:59

## 2022-01-01 RX ADMIN — IPRATROPIUM BROMIDE AND ALBUTEROL SULFATE 3 ML: 2.5; .5 SOLUTION RESPIRATORY (INHALATION) at 19:59

## 2022-01-01 RX ADMIN — PROPOFOL 50 MCG/KG/MIN: 10 INJECTION, EMULSION INTRAVENOUS at 04:33

## 2022-01-01 RX ADMIN — MINERAL OIL, PETROLATUM: 425; 568 OINTMENT OPHTHALMIC at 11:30

## 2022-01-01 RX ADMIN — IPRATROPIUM BROMIDE AND ALBUTEROL SULFATE 3 ML: 2.5; .5 SOLUTION RESPIRATORY (INHALATION) at 06:20

## 2022-01-01 RX ADMIN — PROPOFOL 50 MCG/KG/MIN: 10 INJECTION, EMULSION INTRAVENOUS at 17:32

## 2022-01-01 RX ADMIN — CHLORHEXIDINE GLUCONATE 1 APPLICATION: 500 CLOTH TOPICAL at 03:40

## 2022-01-01 RX ADMIN — ENOXAPARIN SODIUM 40 MG: 100 INJECTION SUBCUTANEOUS at 16:45

## 2022-01-01 RX ADMIN — THIAMINE HYDROCHLORIDE 200 MG: 100 INJECTION, SOLUTION INTRAMUSCULAR; INTRAVENOUS at 17:11

## 2022-01-01 RX ADMIN — FAMOTIDINE 20 MG: 10 INJECTION INTRAVENOUS at 08:08

## 2022-01-01 RX ADMIN — SODIUM CHLORIDE 1000 ML: 9 INJECTION, SOLUTION INTRAVENOUS at 12:10

## 2022-01-01 RX ADMIN — PROPOFOL 50 MCG/KG/MIN: 10 INJECTION, EMULSION INTRAVENOUS at 08:25

## 2022-11-09 PROBLEM — I46.9 CARDIAC ARREST (HCC): Status: ACTIVE | Noted: 2022-01-01

## 2022-11-09 NOTE — CONSULTS
PULMONARY AND CRITICAL CARE CONSULT - [facility]    Trina Ro   MR# [id number]  Acct# [id number]  11/9/2022   14:54 CST    Referring Provider: Usama Rodriguez*    Chief Complaint: Mechanically ventilated    HPI: We are consulted by Usama Rodriguez* to see this 42 y.o. female born on 1980.  Patient is currently unresponsive and intubated unable to provide any information.  Documentation indicates patient was brought in per EMS with CPR in progress.  EMS contacted by family for abnormal respirations and level of alertness.  Patient has a history of seizures.  Family also reported an episode of choking while eating a pork chop yesterday resulting in vomitus.  Patient received defibrillation x4 as well as epinephrine x1 dose.  She was also intubated.  We have been asked to help manage her ventilator status.  Current saturation 100 on PEEP of 8 and FiO2 0.8.  Passively ventilating.  During examination she coughed and appeared to have a fairly significant bronchospasm resulting in an adequate ventilation and high peak airway pressures.  Episode did resolve on its own.  Discussed with RT.  ABG and breathing treatment ordered for now.  No reported history of lung disease although she is an everyday smoker.  Technician at bedside to perform stat EEG.  There is also a MRI ordered.  She is on Levophed for blood pressure support.  Map 75.  No family present.    Past Medical History   has a past medical history of Anxiety state, Cancer (HCC), Endogenous obesity, History of malignant neoplasm of thyroid, Postoperative hypothyroidism, Type 2 diabetes mellitus (HCC), and Vitamin D deficiency.   has a past surgical history that includes Thyroidectomy; Cholecystectomy; and Tonsillectomy.  No Known Allergies  Medications  furosemide, 40 mg, Intravenous, Once  levETIRAcetam, 1,000 mg, Intravenous, Once  potassium chloride, 10 mEq, Intravenous, Q1H  thiamine (VITAMIN B1) IVPB, 200 mg,  Intravenous, Once      norepinephrine, 0.02-0.3 mcg/kg/min  propofol, 5-50 mcg/kg/min, Last Rate: Stopped (11/09/22 1446)      Social History   reports that she has been smoking. She has never used smokeless tobacco. She reports that she does not drink alcohol and does not use drugs. Pt unable to provide history due to mechanical ventilated state  Family History  family history includes Breast cancer in an other family member; Cholelithiasis in an other family member; Diabetes in an other family member; Heart disease in an other family member; Hypertension in an other family member; Kidney disease in an other family member; Pancreatic cancer in an other family member; Prostate cancer in her father; Stroke in her paternal grandmother and another family member; Thyroid disease in an other family member. Pt unable to provide history due to mechanical ventilated state  Review of Systems:  Cannot obtain due to mechanical ventilated state  Physical Exam:  Temp:  [97.9 °F (36.6 °C)] 97.9 °F (36.6 °C)  Heart Rate:  [] 87  Resp:  [17] 17  BP: (103-126)/(62-92) 103/62  Arterial Line BP: (88-95)/(35-65) 89/63  FiO2 (%):  [50 %-80 %] 80 %No intake or output data in the 24 hours ending 11/09/22 1454      11/09/22  1257   Weight: 98.4 kg (217 lb)     SpO2 Percentage    11/09/22 1430 11/09/22 1435 11/09/22 1440   SpO2: 100% 100% 100%     Body mass index is 35.02 kg/m².  Vent Settings        Resp Rate (Set): 20     FiO2 (%): 80 %  PEEP/CPAP (cm H2O): 8 cm H20  Minute Ventilation (L/min) (Obs): 13.9 L/min           PIP Observed (cm H2O): 25 cm H2O  Plateau Pressure (cm H2O): 29 cm H2O        Physical Exam  Constitutional:       General: She is in acute distress.      Appearance: She is obese. She is ill-appearing. She is not toxic-appearing.   HENT:      Head: Normocephalic and atraumatic.      Comments: ETT  EEG leads     Right Ear: External ear normal.      Left Ear: External ear normal.      Nose: Nose normal.   Eyes:       General:         Right eye: No discharge.         Left eye: No discharge.      Conjunctiva/sclera: Conjunctivae normal.   Neck:      Comments: Thick, triple lumen central line right neck  Cardiovascular:      Rate and Rhythm: Normal rate and regular rhythm.      Heart sounds: No murmur heard.  Pulmonary:      Effort: No tachypnea, accessory muscle usage or respiratory distress.      Breath sounds: Decreased air movement present.      Comments: Coughing, biting tube  Abdominal:      General: Abdomen is flat. Bowel sounds are normal. There is no distension.      Comments: obese   Genitourinary:     Comments: hardy  Musculoskeletal:         General: Normal range of motion.      Cervical back: Normal range of motion and neck supple.      Right lower leg: No edema.      Left lower leg: No edema.   Skin:     General: Skin is warm and dry.      Coloration: Skin is not jaundiced or pale.   Neurological:      Comments: intubated       Electronically signed by AIDEN Tarango, 11/9/2022, 14:54 CST      Physician Substantive Portion: Medical Decision Making    Results from last 7 days   Lab Units 11/09/22  1213   WBC 10*3/mm3 16.40*   HEMOGLOBIN g/dL 9.8*   PLATELETS 10*3/mm3 247     Results from last 7 days   Lab Units 11/09/22  1213   SODIUM mmol/L 136   POTASSIUM mmol/L 3.1*   CO2 mmol/L 16.0*   BUN mg/dL 8   CREATININE mg/dL 0.88   MAGNESIUM mg/dL 2.5   GLUCOSE mg/dL 256*     Results from last 7 days   Lab Units 11/09/22  1455 11/09/22  1306   PH, ARTERIAL pH units 7.250* 7.162*   PCO2, ARTERIAL mm Hg 43.6 43.8   PO2 ART mm Hg 122.0* 66.0*   FIO2 % 80 65     Lab Results   Component Value Date    PROBNP 14,804.0 (H) 11/09/2022     No results found for: BLOODCX, URINECX, WOUNDCX, MRSACX, RESPCX, STOOLCX    Recent radiology:   Imaging Results (Last 72 Hours)     Procedure Component Value Units Date/Time    CT Angiogram Chest [[id number]] Collected: 11/09/22 1406     Updated: 11/09/22 1414    Narrative:      CT ANGIOGRAM  CHEST- 11/9/2022 1:47 AM CST      HISTORY: Pulmonary embolism (PE) suspected, unknown D-dimer      COMPARISON: Chest x-ray dated 11/9/2022.      DOSE LENGTH PRODUCT: 247 mGy cm. Automated exposure control was also  utilized to decrease patient radiation dose.     TECHNIQUE: Helical tomographic images of the chest were obtained after  the administration of intravenous contrast following angiogram protocol.  Additionally, 3D and multiplanar reformatted images were provided.        FINDINGS:    Pulmonary arteries: There is adequate enhancement of the pulmonary  arteries to evaluate for central and segmental pulmonary emboli. There  are no filling defects within the main, lobar, segmental or visualized  subsegmental pulmonary arteries. The pulmonary arteries are within  normal limits for size.      Aorta and great vessels: Thoracic aorta is normal in caliber. No  dissection identified. No flow-limiting stenosis identified at the great  vessel origins.     Visualized neck base: The imaged portion of the base of the neck appears  unremarkable.      Lungs: Bilateral septal thickening and mixed groundglass and  consolidative dependent airspace opacities reflecting pulmonary edema.  There are bilateral small layering pleural effusions. Endotracheal tube  is present with tip approximately 4.2 cm above the lori. No  pneumothorax. Minimal secretions in the trachea. Airways are otherwise  clear.     Heart: Enlarged left ventricle. There is no pericardial effusion.      Mediastinum and lymph nodes: No suspicious hilar or mediastinal  adenopathy..     Skeletal and soft tissues: Chest wall soft tissues are unremarkable. No  acute bony abnormality.       Upper abdomen: The imaged portion of the upper abdomen demonstrates no  acute process.        Impression:      1. No evidence of pulmonary embolus.  2. Enlarged left ventricle. Pulmonary edema with bilateral small  layering pleural effusions.  3. Endotracheal tube is in good  position.        This report was finalized on 11/09/2022 14:11 by Dr Antonio Ahmadi, .    CT Head Without Contrast [[id number]] Collected: 11/09/22 1400     Updated: 11/09/22 1406    Narrative:      CT HEAD WO CONTRAST- 11/9/2022 1:47 AM CST     HISTORY: Neuro deficit, acute, stroke suspected       DOSE LENGTH PRODUCT: 1083 mGy cm. Automated exposure control was also  utilized to decrease patient radiation dose.     Technique:   Axial CT of the brain without IV contrast. Sagittal and coronal  reformations are also provided for review. Soft tissue and bone kernels  are available for interpretation.     Comparison: None.     Findings:      There is no evidence of acute large vascular territory infarct. No  intra-axial or extra-axial hemorrhage. No visualized mass lesion or mass  effect. The ventricles, cortical sulci and basal cisterns are symmetric  and age appropriate.  Posterior fossa structures are unremarkable. The  scalp and calvarium are intact. Visualized paranasal sinuses and  mastoids are clear.        Impression:      Impression:    1. No acute intracranial process.  This report was finalized on 11/09/2022 14:02 by Dr Antonio Ahmadi, .    XR Chest 1 View [[id number]] Collected: 11/09/22 1300     Updated: 11/09/22 1306    Narrative:      XR CHEST 1 VW- 11/9/2022 12:40 PM CST     HISTORY: CVC placement, ETT placement verification     COMPARISON: None.     FINDINGS:      Status post endotracheal intubation. Endotracheal tube is in good  position. There is a right IJ central venous catheter with tip  projecting over the right atrium. Cardiomegaly is present. There are  bilateral hazy pulmonary infiltrates, central predominant, suggesting  pulmonary edema. Lungs are well expanded. No pleural effusion or  pneumothorax. Defibrillator pad is in place. No acute bony abnormality  seen.       Impression:      1. Endotracheal tube is in good position. Lungs are well expanded.  2. Bilateral central predominant hazy  pulmonary infiltrates concerning  for pulmonary edema.  3. Central venous catheter is in good position.        This report was finalized on 11/09/2022 13:03 by Dr Antonio Ahmadi, .        My radiograph interpretation/independent review of imaging: Reviewed and agree with current interpretation.  Patient has bilateral pulm infiltrate and pleural effusions.  It is most likely volume overload with elevated BNP but possibility of aspiration pneumonia cannot be ruled out.  Other test results (not lab or imaging): Results for orders placed during the hospital encounter of 11/09/22    Adult Transthoracic Echo Complete W/ Cont if Necessary Per Protocol    Interpretation Summary  •  Left ventricular systolic function is severely decreased. Left ventricular ejection fraction appears to be 26 - 30%.  •  Right ventricle not well visualized but appears to have grossly normal size and systolic function.  •  Cardiac valves are poorly visualized, however no obvious abnormalities by Doppler assessment.  Reviewed.  Ischemic cardiomyopathy noted  Independent review of ekg: Done.  Problem List as identified by Epic (may contain historical, inactive problems)  Patient Active Problem List   Diagnosis   • Postoperative hypothyroidism   • Controlled type 2 diabetes mellitus without complication, without long-term current use of insulin (HCC)   • Essential hypertension   • Vitamin D deficiency   • Tobacco abuse   • History of thyroid cancer   • Chronic hepatitis C without hepatic coma (HCC)   • Elevated liver function tests   • Cardiac arrest (HCC)     Pulmonary Assessment:  New problem (to me), with additional workup planned: Acute hypoxic respiratory failure, out of hospital cardiac arrest, s/p CPR and multiple DC shocks, reported cardiac arrhythmia versus pulseless electrical activity, ischemic and dilated cardiomyopathy, bilateral pulmonary infiltrate, aspiration pneumonia, seizure activity, congestive systolic heart failure, bilateral  pleural effusion  New problem (to me), no additional workup planned: History of tobacco abuse, history of thyroid cancer and hypothyroidism, hypertension, diabetes mellitus type 2, transaminitis, positive hepatitis C, Anxiety and depression,  Other problems either stable, failing to improve or worsenin. Acute hypoxic respiratory failure  2. Oral intubation and mechanical ventilation  3. Out-of-hospital cardiac arrest s/p CPR multiple defibrillator shocks  4. Dilated and ischemic cardiomyopathy with low ejection fraction  5. Acute on chronic congestive systolic and diastolic heart failure  6. Seizure activity possible anoxic encephalopathy  7. History of seizure activity in the past  8. Hypertension  9. Diabetes mellitus type 2  10. History of thyroid cancer and hypothyroidism  11. Tobacco abuse/possible COPD  12. Anxiety and depression  13. Hepatitis C positive with transaminitis  14. Allergic rhinitis    Recommend/plan:   · Patient presented today in the ER after she had out of hospital cardiac arrest.  · No family was present at the time of visit and history was obtained from the chart notes and from talking to the RN  · That history is not very clear at this moment but apparently patient did have some choking event last night while eating followed by coughing up some black thick sputum and she also had a seizure-like activity which later recovered.  · She apparently did have seizure-like activity or seizures in the past but she was not on any antiepileptic medications  · She has known history of hypertension, diabetes mellitus type 2, and also has chronic pain and anxiety.  She has allergy problems as well and possibly has asthma and COPD but she was not on any notes at home.  She is an active smoker.  · She lives at home with her  and reportedly she had a witnessed seizure-like activity and stopped breathing at home.  Her  had a recent surgery done and was unable to do a CPR but EMS was  called.  · Fire department arrived first and they found the patient in her home which is actually a trailer home and they apparently did shock the patient 1 or 2 times.  EMS arrived later and patient was intubated on the way to the emergency department and patient apparently 3 or 4 more shocks on the way to the hospital.   · On arrival to the hospital patient was intubated and was placed on ventilator.  After further work-up showed she has elevated BNP and patient was also noted to have bilateral pleural effusion bilateral pulm infiltrate suggesting pneumonia and heart failure.  Ejection fraction was very low in the echocardiogram suggesting ischemic and dilated cardiomyopathy.  Left ventricular hypertrophy noted in the imaging studies.  Patient is started on Keppra for seizure activity and patient was getting a EEG monitoring at the time of my visit.  She was unresponsive and could not provide any history.  · Patient is obviously not ready for ventilator weaning.  Ventilator settings has been adjusted.  Her oxygenation is adequate.  · She is currently on assist-control volume control ventilation, tidal volume 500 rate of 20 PEEP decreased to 5 and FiO2 was kept at 40%.  She is going to have spontaneous breathing trial if possible after she is stable.  She may need tube feeding if she remains on the ventilator  · As she has history of tobacco use and probable COPD she was started on Pulmicort and DuoNeb.  She will need a work-up for the COPD with a PFT when she is more stable and smoking cessation is to be addressed  · She has allergic rhinitis and she will be started on fluticasone nasal spray and Zyrtec..  · Keppra will be continued for seizure activity.  Patient is a possibility of anoxic encephalopathy because her downtime after the cardiac arrest was unclear.  Neurologic will be consulted and further work-up for possible brain injury is in progress.  · Patient has leukocytosis and has bilateral pulmonary  infiltrate and pleural effusion.  This could be from aspiration pneumonia.  She apparently had a vomiting last night and seizure-like activity as well.  · I recommend to start the patient on Zosyn.  The patient was noted to have hepatitis C positive status and was on Mavyret at home which will be continued.  She was also getting thyroid supplement for hypothyroidism and had a history of thyroid cancer.  · Continue home medication blood pressure and glycemic control per hospitalist team  · Repeat labs and imaging studies from time to time.  She is current sedation if needed provided patient has no problem with her neurologic assessment.  She is currently unresponsive  · Care plan discussed with RN.  · CODE STATUS: Full.  Overall prognosis: Guarded  · We appreciate the consult and we will follow  · Total time spent in seeing this patient as inpatient pulmonary consult was 60 minutes    This visit was performed by both a physician and an [facility] RN.  I personally evaluated and examined the patient.  I performed all aspects of the medical decision making as documented.    Electronically signed by     Carlin Moseley MD,  Pulmonologist/Intensivist   11/9/2022, 18:31 CST

## 2022-11-09 NOTE — H&P
"    [facility] Medicine Services  HISTORY AND PHYSICAL    Date of Admission: 11/9/2022  Primary Care Physician: Franco Pascual MD    Subjective     Chief Complaint: Status post cardiorespiratory resuscitation  History of Present Illness  Patient unable to give any historical details due to current condition  Case discussed with Dr. Kaylah Wakefield of emergency room.  I also spoke to the nurses caring for her.  I was able to speak to Dr. Pereira as well  Patient reportedly is 42-year-old.  Yesterday she choked on pork chop and had some vomitus described to be \"black material\"  According to Dr. Wakefield patient has history of seizure.  Patient reportedly gurgling and then fell and then subsequently had CPR.  It was not clear when was the actual CPR initiated but based on information gathered from there is this it likely is greater than 10 minutes.  I am informed that the fire department came in for his.  Patient had 4 episodes of defibrillation.  I do not have any rhythm strip during the event.  Dr. Wakefield mentioned that patient has PEA when she arrived in the emergency room  She gave a dose of epinephrine.  Pulse regained  Patient was initiated on code chill.    Patient seen at bedside.  She is going for head CT scan  Her blood pressure is in the 70s with mean arterial pressure of 65.  Her distal extremities is warm  No gross cyanosis  She is biting hard on the ET tube  She is mechanically ventilated with setting of assist-control, rate of 20, tidal volume 500, FiO2 of 80 and 8 PEEP  Her blood pressure on arterial line is 74/52    I ordered for bolus of 500 cc fluid.  She actually received a liter already out of the 1.5 L requested by ER    There is an EKG ordered but has not been done yet.    Diagnostic studies:  pH of 7.16, PCO2 44, PaO2 of 66 on assist control, tidal volume 500 rate of 20, PEEP 5 and FiO2 of 65%  Potassium 3.1, anion gap 18, CO2 of 16, sugar 256, albumin " 2.9  PT PTT are 15.6 and 39.6 respectively  White count is 16.4 with predominance of lymphocytes, there is presence of bands  proBNP 14,800  Chest x-ray: ET tube in place lungs well expanded haziness bilateral centrally concerning for pulmonary edema.        Review of Systems   Unble to give any historical details    Past Medical History:   Past Medical History:   Diagnosis Date   • Anxiety state    • Cancer (HCC)     THYROID   • Endogenous obesity    • History of malignant neoplasm of thyroid    • Postoperative hypothyroidism    • Type 2 diabetes mellitus (HCC)    • Vitamin D deficiency      Past Surgical History:  Past Surgical History:   Procedure Laterality Date   • CHOLECYSTECTOMY     • THYROIDECTOMY     • TONSILLECTOMY       Social History:  reports that she has been smoking. She has never used smokeless tobacco. She reports that she does not drink alcohol and does not use drugs.    Family History: family history includes Breast cancer in an other family member; Cholelithiasis in an other family member; Diabetes in an other family member; Heart disease in an other family member; Hypertension in an other family member; Kidney disease in an other family member; Pancreatic cancer in an other family member; Prostate cancer in her father; Stroke in her paternal grandmother and another family member; Thyroid disease in an other family member.        Allergies:  No Known Allergies    Medications:  Prior to Admission medications    Medication Sig Start Date End Date Taking? Authorizing Provider   Alogliptin Benzoate 12.5 MG tablet Take 1 tablet by mouth Daily. 2/11/19   Siddhartha Cole APRN   busPIRone (BUSPAR) 5 MG tablet Take  by mouth. TAKE 1-2 TABLETS BY MOUTH TID    Reji Sawyer MD   cetirizine (zyrTEC) 10 MG tablet Take 10 mg by mouth Daily.    ProviderReji MD   DULoxetine (Cymbalta) 60 MG capsule Take 60 mg by mouth Daily.    ProviderReji MD   gabapentin (NEURONTIN) 100 MG  "capsule Take 200 mg by mouth 3 (Three) Times a Day.    Reji Sawyer MD   Glecaprevir-Pibrentasvir (Mavyret) 100-40 MG tablet Take 3 tablets by mouth Daily. 5/14/20   Dani Mott MD   hydrochlorothiazide (HYDRODIURIL) 25 MG tablet Take 25 mg by mouth daily.    Reji Sawyer MD   hydrOXYzine pamoate (VISTARIL) 25 MG capsule  2/20/20   Reji Sawyer MD   levothyroxine (SYNTHROID, LEVOTHROID) 175 MCG tablet TAKE ONE TABLET BY MOUTH EVERY DAY EXCEPT ON SUNDAY TAKE TWO TABLETS BY MOUTH 12/13/18   Siddhartha Cole APRN   lisinopril (PRINIVIL,ZESTRIL) 10 MG tablet Take 10 mg by mouth Daily.    Reji Sawyer MD   metFORMIN (GLUCOPHAGE) 1000 MG tablet Take 1 tablet by mouth 2 (Two) Times a Day With Meals. 3/7/18   Siddhartha Cole APRN   montelukast (SINGULAIR) 10 MG tablet Take 10 mg by mouth.    Reji Sawyer MD   prazosin (MINIPRESS) 1 MG capsule Take 1 mg by mouth Every Night.    Reji Sawyer MD   traZODone (DESYREL) 50 MG tablet Take 50 mg by mouth Every Night.    Reji Sawyer MD     I have utilized all available immediate resources to obtain, update, and review the patient's current medications.    Objective     Vital Signs: /92   Pulse 120   Temp 97.9 °F (36.6 °C) (Axillary)   Resp 17   Ht 167.6 cm (66\")   Wt 98.4 kg (217 lb)   SpO2 96%   BMI 35.02 kg/m²   Physical Exam   GEN: Intubated sedated on 50 mcg of propofol  Mechanically ventilated  HEENT: Atraumatic, sluggishly reactive pupil measuring 3 to 4 mm anicteric, Trachea midline  Biting tube  Lungs: CTAB, no wheezing/rales/rhonchi  Heart: Tachycardic +S1/s2, no rub patient apparently not hooked to the telemetry to read  ABD: soft, nt/nd, +BS, no guarding/rebound  Extremities: atraumatic, no cyanosis, no edema  Skin: no rashes or lesions  Neuro: Sedated intubated  Psych: Flat affect  Patient does not appear to be fluid overloaded and probably clinically needing IV fluids as a " "bolus her with fluid I also stopped the Lasix particularly in shock  Other description shown of exam as shown in the H&P  Results Reviewed:  Lab Results (last 24 hours)     Procedure Component Value Units Date/Time    POC Urine Pregnancy [[id number]]  (Normal) Resulted: 11/09/22 1345    Specimen: Urine Updated: 11/09/22 1347     HCG, Urine, QL Negative     Lot Number [id number]     Internal Positive Control Positive     Internal Negative Control Negative     Expiration Date 02/29/2024    BNP [[id number]]  (Abnormal) Collected: 11/09/22 1213    Specimen: Blood Updated: 11/09/22 1342     proBNP 14,804.0 pg/mL     Narrative:      Among patients with dyspnea, NT-proBNP is highly sensitive for the detection of acute congestive heart failure. In addition NT-proBNP of <300 pg/ml effectively rules out acute congestive heart failure with 99% negative predictive value.      TSH [[id number]]  (Abnormal) Collected: 11/09/22 1213    Specimen: Blood Updated: 11/09/22 1331     TSH 41.480 uIU/mL     Procalcitonin [[id number]]  (Normal) Collected: 11/09/22 1213    Specimen: Blood Updated: 11/09/22 1330     Procalcitonin 0.05 ng/mL     Narrative:      As a Marker for Sepsis (Non-Neonates):    1. <0.5 ng/mL represents a low risk of severe sepsis and/or septic shock.  2. >2 ng/mL represents a high risk of severe sepsis and/or septic shock.    As a Marker for Lower Respiratory Tract Infections that require antibiotic therapy:    PCT on Admission    Antibiotic Therapy       6-12 Hrs later    >0.5                Strongly Recommended  >0.25 - <0.5        Recommended   0.1 - 0.25          Discouraged              Remeasure/reassess PCT  <0.1                Strongly Discouraged     Remeasure/reassess PCT    As 28 day mortality risk marker: \"Change in Procalcitonin Result\" (>80% or <=80%) if Day 0 (or Day 1) and Day 4 values are available. Refer to http://www.[employer]-pct-calculator.com    Change in PCT <=80%  A decrease of PCT levels below or " equal to 80% defines a positive change in PCT test result representing a higher risk for 28-day all-cause mortality of patients diagnosed with severe sepsis for septic shock.    Change in PCT >80%  A decrease of PCT levels of more than 80% defines a negative change in PCT result representing a lower risk for 28-day all-cause mortality of patients diagnosed with severe sepsis or septic shock.       CBC & Differential [[id number]]  (Abnormal) Collected: 11/09/22 1213    Specimen: Blood Updated: 11/09/22 1330    Narrative:      The following orders were created for panel order CBC & Differential.  Procedure                               Abnormality         Status                     ---------                               -----------         ------                     CBC Auto Differential[[id number]]        Abnormal            Final result               Slide Review, Hematology[[id number]]                         In process                   Please view results for these tests on the individual orders.    CBC Auto Differential [[id number]]  (Abnormal) Collected: 11/09/22 1213    Specimen: Blood Updated: 11/09/22 1330     WBC 16.40 10*3/mm3      RBC 4.00 10*6/mm3      Hemoglobin 9.8 g/dL      Hematocrit 34.1 %      MCV 85.3 fL      MCH 24.5 pg      MCHC 28.7 g/dL      RDW 15.8 %      RDW-SD 49.3 fl      MPV 10.7 fL      Platelets 247 10*3/mm3     Manual Differential [[id number]]  (Abnormal) Collected: 11/09/22 1213    Specimen: Blood Updated: 11/09/22 1329     Neutrophil % 35.2 %      Lymphocyte % 43.8 %      Monocyte % 3.1 %      Eosinophil % 1.6 %      Bands %  7.8 %      Metamyelocyte % 3.9 %      Myelocyte % 1.6 %      Atypical Lymphocyte % 0.8 %      Neutrophils Absolute 7.05 10*3/mm3      Lymphocytes Absolute 7.31 10*3/mm3      Monocytes Absolute 0.51 10*3/mm3      Eosinophils Absolute 0.26 10*3/mm3      nRBC 2.3 /100 WBC      Anisocytosis Mod/2+     Crenated RBC's Large/3+     Microcytes Slight/1+     Ovalocytes  Slight/1+     Poikilocytes Large/3+     Polychromasia Mod/2+     WBC Morphology Normal     Platelet Estimate Adequate    Lactic Acid, Plasma [[id number]]  (Abnormal) Collected: 11/09/22 1213    Specimen: Blood Updated: 11/09/22 1329     Lactate 7.1 mmol/L     Slide Review, Hematology [[id number]] Collected: 11/09/22 1213    Specimen: Blood Updated: 11/09/22 1328    Comprehensive Metabolic Panel [[id number]]  (Abnormal) Collected: 11/09/22 1213    Specimen: Blood Updated: 11/09/22 1325     Glucose 256 mg/dL      BUN 8 mg/dL      Creatinine 0.88 mg/dL      Sodium 136 mmol/L      Potassium 3.1 mmol/L      Comment: Slight hemolysis detected by analyzer. Results may be affected.        Chloride 102 mmol/L      CO2 16.0 mmol/L      Calcium 8.3 mg/dL      Total Protein 6.1 g/dL      Albumin 2.90 g/dL      ALT (SGPT) 7 U/L      AST (SGOT) 17 U/L      Alkaline Phosphatase 79 U/L      Total Bilirubin 0.3 mg/dL      Globulin 3.2 gm/dL      A/G Ratio 0.9 g/dL      BUN/Creatinine Ratio 9.1     Anion Gap 18.0 mmol/L      eGFR 83.2 mL/min/1.73      Comment: National Kidney Foundation and American Society of Nephrology (ASN) Task Force recommended calculation based on the Chronic Kidney Disease Epidemiology Collaboration (CKD-EPI) equation refit without adjustment for race.       Narrative:      GFR Normal >60  Chronic Kidney Disease <60  Kidney Failure <15      Troponin [[id number]]  (Normal) Collected: 11/09/22 1213    Specimen: Blood Updated: 11/09/22 1320     Troponin T 0.021 ng/mL     Narrative:      Troponin T Reference Range:  <= 0.03 ng/mL-   Negative for AMI  >0.03 ng/mL-     Abnormal for myocardial necrosis.  Clinicians would have to utilize clinical acumen, EKG, Troponin and serial changes to determine if it is an Acute Myocardial Infarction or myocardial injury due to an underlying chronic condition.       Results may be falsely decreased if patient taking Biotin.      Lipase [[id number]]  (Normal) Collected: 11/09/22 1213     Specimen: Blood Updated: 11/09/22 1320     Lipase 28 U/L     Ethanol [[id number]] Collected: 11/09/22 1213    Specimen: Blood Updated: 11/09/22 1320     Ethanol % <0.010 %     Narrative:      Not for legal purposes. Chain of Custody not followed.     Blood Culture - Blood, Arm, Right [[id number]] Collected: 11/09/22 1213    Specimen: Blood from Arm, Right Updated: 11/09/22 1317    [place] Draw [[id number]] Collected: 11/09/22 1213    Specimen: Blood Updated: 11/09/22 1315    Narrative:      The following orders were created for panel order [place] Draw.  Procedure                               Abnormality         Status                     ---------                               -----------         ------                     Green Top (Gel)[[id number]]                                  Final result               Lavender Top[[id number]]                                     Final result               Zelaya Top[[id number]]                                         In process                 Light Blue Top[[id number]]                                   Final result                 Please view results for these tests on the individual orders.    Lavender Top [[id number]] Collected: 11/09/22 1213    Specimen: Blood Updated: 11/09/22 1315     Extra Tube hold for add-on     Comment: Auto resulted       Green Top (Gel) [[id number]] Collected: 11/09/22 1213    Specimen: Blood Updated: 11/09/22 1315     Extra Tube Hold for add-ons.     Comment: Auto resulted.       Light Blue Top [[id number]] Collected: 11/09/22 1213    Specimen: Blood Updated: 11/09/22 1315     Extra Tube Hold for add-ons.     Comment: Auto resulted       Protime-INR [[id number]]  (Abnormal) Collected: 11/09/22 1213    Specimen: Blood Updated: 11/09/22 1314     Protime 15.6 Seconds      INR 1.29    aPTT [[id number]]  (Abnormal) Collected: 11/09/22 1213    Specimen: Blood Updated: 11/09/22 1314     PTT 39.6 seconds     Blood Gas, Arterial - [[id number]]  (Abnormal)  Collected: 11/09/22 1306    Specimen: Arterial Blood Updated: 11/09/22 1304     Site Arterial Line     Dagoberto's Test N/A     pH, Arterial 7.162 pH units      Comment: 85 Value below critical limit        pCO2, Arterial 43.8 mm Hg      pO2, Arterial 66.0 mm Hg      Comment: 84 Value below reference range        HCO3, Arterial 15.7 mmol/L      Comment: 84 Value below reference range        Base Excess, Arterial -12.4 mmol/L      Comment: 84 Value below reference range        O2 Saturation, Arterial 86.6 %      Comment: 84 Value below reference range        Temperature 37.0 C      Barometric Pressure for Blood Gas 756 mmHg      Modality Ventilator     FIO2 65 %      Ventilator Mode AC     Set Tidal Volume 500     Set Mech Resp Rate 20.0     PEEP 5.0     Notified Who DR NEWSOME     Notified By [id number]     Notified Time 11/09/2022 13:08     Collected by [id number]     Comment: Meter: [id number]     :  [id number]        pCO2, Temperature Corrected 43.8 mm Hg      pH, Temp Corrected 7.162 pH Units      pO2, Temperature Corrected 66.0 mm Hg     Gray Top [[id number]] Collected: 11/09/22 1213    Specimen: Blood Updated: 11/09/22 1224        Imaging Results (Last 24 Hours)     Procedure Component Value Units Date/Time    CT Head Without Contrast [[id number]] Resulted: 11/09/22 1319     Updated: 11/09/22 1319    CT Angiogram Chest [[id number]] Resulted: 11/09/22 1319     Updated: 11/09/22 1319    XR Chest 1 View [[id number]] Collected: 11/09/22 1300     Updated: 11/09/22 1306    Narrative:      XR CHEST 1 VW- 11/9/2022 12:40 PM CST     HISTORY: CVC placement, ETT placement verification     COMPARISON: None.     FINDINGS:      Status post endotracheal intubation. Endotracheal tube is in good  position. There is a right IJ central venous catheter with tip  projecting over the right atrium. Cardiomegaly is present. There are  bilateral hazy pulmonary infiltrates, central predominant, suggesting  pulmonary edema. Lungs are well  expanded. No pleural effusion or  pneumothorax. Defibrillator pad is in place. No acute bony abnormality  seen.       Impression:      1. Endotracheal tube is in good position. Lungs are well expanded.  2. Bilateral central predominant hazy pulmonary infiltrates concerning  for pulmonary edema.  3. Central venous catheter is in good position.        This report was finalized on 11/09/2022 13:03 by Dr Antonio Ahmadi, .        I have personally reviewed and interpreted the radiology studies and ECG obtained at time of admission.     Assessment / Plan     Assessment:   Active Hospital Problems    Diagnosis    • **Cardiac arrest (HCC)        Problem list not necessary in order priority  · Anion metabolic acidosis  · Lactic acidosis  · Suspected seizure  · Encephalopathy possibly from seizure versus other etiologies  · Status post cardiorespiratory resuscitation.  Reportedly PEA.  Initially placed on code chill protocol.  After reviewing the inclusion and exclusion criteria, patient believed to have an exclusion criteria for code chill (suspected seizures/status epilepticus, initiation of BLS greater than 10 minutes)  · Acute respiratory failure requiring mechanical ventilation  · Elevated BNP  · Shock (etiology unclear) -Levophed  · Hypokalemia replace potassium  · ? Hx of Hep c base on med from home (Mavyret)  · Elevated thyroid stimulating hormone - ? Hx of thyroid cancer with thyroidectomy - on supplemental HRT. Change to iv dose ~ half of normal PO dose  · ? Medical compliance  · Hyperglycemia - ? Hx of DM      plan:      Head CT and CT angiogram chest pending  Patient received a dose of Lasix  Noted order in previous order for fluid  We will check echocardiogram  Will request for EEG  Neurology consulted  We will hold off on Keppra as discussed with Dr. Pereira  Pulmonary consult for vent management  To prevent for vent synchronization, sedation and or management of suspected status epilepticus until proven  otherwise  We will check magnesium, urine drug screen  Will need levophed for hypertension code  DC code chill based on exclusion criteria.  Hold metformin and antihtn meds  Npo  dvt and stress ulcer prophylaxis  Stat eeg  Critical care time greater than 30 minutes  Prognosis guarded status/Advanced Care Plan: Full code  The patient's surrogate decision maker is to be determined  I discussed my findings and recommendations with the nurses, Dr. Pereira and Dr. Wakefield    Estimated length of stay is to be determined.     The patient was seen and examined by me on  Electronically signed by Usama Rodriguez MD, 11/09/22, 13:53 CST.
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
## 2022-11-09 NOTE — PROGRESS NOTES
Dr. Pereira requested that patient be paralyzed.  My experience and paralytics are mostly on rapid sequence intubation and patient is on code chill protocol.  She needed patient to be paralyzed in order to visualize better any seizure activities.  She said that there is lots of muscle artifacts from what she is seeing right now.  Patient is currently intubated.  I requested the pharmacist (Zahraa) and discuss the goal of care.  I looked at rocuronium and vecuronium as possible option.  Will just give her a one-time dose.  Patient is off propofol while on EEG.  Therefore, we decided also to give her fentanyl so she will just be paralyzed and aware that she could not move.    Airway air is currently secured.    Patient hemodynamically stable  Urine drug screen negative  Blood gas showing improvement in her pH  Lactic acid down at 2.5  Magnesium 2.5  Case discussed with nurse practitioner Troy with pulmonary as well.  Case discussed with nurse Indira.  Patient has been identified correctly and ordered placed correctly as verified by pharmacist.    Additional critical time spent at least 15 minutes.    Noted based on CT angiogram the following  Imaging Results (Last 24 Hours)     Procedure Component Value Units Date/Time    CT Angiogram Chest [[id number]] Collected: 11/09/22 1406     Updated: 11/09/22 1414    Narrative:      CT ANGIOGRAM CHEST- 11/9/2022 1:47 AM CST      HISTORY: Pulmonary embolism (PE) suspected, unknown D-dimer      COMPARISON: Chest x-ray dated 11/9/2022.      DOSE LENGTH PRODUCT: 247 mGy cm. Automated exposure control was also  utilized to decrease patient radiation dose.     TECHNIQUE: Helical tomographic images of the chest were obtained after  the administration of intravenous contrast following angiogram protocol.  Additionally, 3D and multiplanar reformatted images were provided.        FINDINGS:    Pulmonary arteries: There is adequate enhancement of the pulmonary  arteries to evaluate for  central and segmental pulmonary emboli. There  are no filling defects within the main, lobar, segmental or visualized  subsegmental pulmonary arteries. The pulmonary arteries are within  normal limits for size.      Aorta and great vessels: Thoracic aorta is normal in caliber. No  dissection identified. No flow-limiting stenosis identified at the great  vessel origins.     Visualized neck base: The imaged portion of the base of the neck appears  unremarkable.      Lungs: Bilateral septal thickening and mixed groundglass and  consolidative dependent airspace opacities reflecting pulmonary edema.  There are bilateral small layering pleural effusions. Endotracheal tube  is present with tip approximately 4.2 cm above the lori. No  pneumothorax. Minimal secretions in the trachea. Airways are otherwise  clear.     Heart: Enlarged left ventricle. There is no pericardial effusion.      Mediastinum and lymph nodes: No suspicious hilar or mediastinal  adenopathy..     Skeletal and soft tissues: Chest wall soft tissues are unremarkable. No  acute bony abnormality.       Upper abdomen: The imaged portion of the upper abdomen demonstrates no  acute process.        Impression:      1. No evidence of pulmonary embolus.  2. Enlarged left ventricle. Pulmonary edema with bilateral small  layering pleural effusions.  3. Endotracheal tube is in good position.        This report was finalized on 11/09/2022 14:11 by Dr Antonio Ahmadi, .    CT Head Without Contrast [[id number]] Collected: 11/09/22 1400     Updated: 11/09/22 1406    Narrative:      CT HEAD WO CONTRAST- 11/9/2022 1:47 AM CST     HISTORY: Neuro deficit, acute, stroke suspected       DOSE LENGTH PRODUCT: 1083 mGy cm. Automated exposure control was also  utilized to decrease patient radiation dose.     Technique:   Axial CT of the brain without IV contrast. Sagittal and coronal  reformations are also provided for review. Soft tissue and bone kernels  are available for  interpretation.     Comparison: None.     Findings:      There is no evidence of acute large vascular territory infarct. No  intra-axial or extra-axial hemorrhage. No visualized mass lesion or mass  effect. The ventricles, cortical sulci and basal cisterns are symmetric  and age appropriate.  Posterior fossa structures are unremarkable. The  scalp and calvarium are intact. Visualized paranasal sinuses and  mastoids are clear.        Impression:      Impression:    1. No acute intracranial process.  This report was finalized on 11/09/2022 14:02 by Dr Antonio Ahmadi, .    XR Chest 1 View [[id number]] Collected: 11/09/22 1300     Updated: 11/09/22 1306    Narrative:      XR CHEST 1 VW- 11/9/2022 12:40 PM CST     HISTORY: CVC placement, ETT placement verification     COMPARISON: None.     FINDINGS:      Status post endotracheal intubation. Endotracheal tube is in good  position. There is a right IJ central venous catheter with tip  projecting over the right atrium. Cardiomegaly is present. There are  bilateral hazy pulmonary infiltrates, central predominant, suggesting  pulmonary edema. Lungs are well expanded. No pleural effusion or  pneumothorax. Defibrillator pad is in place. No acute bony abnormality  seen.       Impression:      1. Endotracheal tube is in good position. Lungs are well expanded.  2. Bilateral central predominant hazy pulmonary infiltrates concerning  for pulmonary edema.  3. Central venous catheter is in good position.        This report was finalized on 11/09/2022 13:03 by Dr Antonio Ahmadi, .        Based on finding of pulmonary edema, I will give the patient Lasix IV and will just uptitrate levophed depending on response.    Electronically signed by Usama Rodriguez MD, 11/09/22, 3:53 PM CST.

## 2022-11-09 NOTE — ED PROVIDER NOTES
Subjective   History of Present Illness  Patient is a 42-year-old female who presented to the ER in cardiac arrest.  Per EMS, they were called to the scene for an unresponsive patient.  When they arrived the fire department was already there and had an AED on the patient.  The AED shocked the patient 4-5 times.  Patient remained in PEA during transport to the ER.  She was given 1 round of epinephrine.  Patient does have a history of seizures and the family thought she may be having a seizure.    Family arrived at the scene.   states that the patient states she got choked on a pork chop last night and vomited a black mass.   states he was at the house today and the patient made a gurgling noise and became unresponsive.  Patient's  states he started CPR immediately.  He states he continued CPR until EMS arrived.  Patient has not been sick otherwise.  No further HPI could be obtained due to altered mental status.        Review of Systems   Unable to perform ROS: Intubated       Past Medical History:   Diagnosis Date   • Anxiety state    • Cancer (HCC)     THYROID   • Endogenous obesity    • History of malignant neoplasm of thyroid    • Postoperative hypothyroidism    • Type 2 diabetes mellitus (HCC)    • Vitamin D deficiency        No Known Allergies    Past Surgical History:   Procedure Laterality Date   • CHOLECYSTECTOMY     • THYROIDECTOMY     • TONSILLECTOMY         Family History   Problem Relation Age of Onset   • Hypertension Other    • Thyroid disease Other    • Diabetes Other    • Heart disease Other    • Stroke Other    • Breast cancer Other    • Cholelithiasis Other    • Kidney disease Other    • Pancreatic cancer Other    • Prostate cancer Father    • Stroke Paternal Grandmother        Social History     Socioeconomic History   • Marital status:    Tobacco Use   • Smoking status: Every Day   • Smokeless tobacco: Never   • Tobacco comments:     smoking cessation encouraged     Substance and Sexual Activity   • Alcohol use: No   • Drug use: No           Objective   Physical Exam  Vitals and nursing note reviewed.   Constitutional:       Appearance: She is well-developed.      Interventions: She is intubated.      Comments: unresponsive   HENT:      Head: Normocephalic and atraumatic.      Mouth/Throat:      Comments: intubated  Eyes:      Conjunctiva/sclera: Conjunctivae normal.      Pupils: Pupils are equal, round, and reactive to light.   Cardiovascular:      Rate and Rhythm: Regular rhythm. Tachycardia present.      Heart sounds: Normal heart sounds.   Pulmonary:      Effort: She is intubated.      Breath sounds: Rhonchi present.   Abdominal:      Palpations: Abdomen is soft.      Tenderness: There is no abdominal tenderness.   Musculoskeletal:         General: No deformity. Normal range of motion.      Cervical back: Normal range of motion.   Skin:     General: Skin is warm.   Neurological:      Comments: Intubated, no purposeful movements   Psychiatric:         Behavior: Behavior normal.         Central Line At Bedside    Date/Time: 11/9/2022 2:17 PM  Performed by: Kaylah Wakefield MD  Authorized by: Kaylah Wakefield MD     Consent:     Consent obtained:  Emergent situation  Universal protocol:     Patient identity confirmed:  Anonymous protocol, patient vented/unresponsive  Pre-procedure details:     Indication(s): central venous access and hemodynamic monitoring      Skin preparation:  Chlorhexidine  Sedation:     Sedation type:  None  Anesthesia:     Anesthesia method:  None  Procedure details:     Location:  R internal jugular    Patient position:  Reverse Trendelenburg    Procedural supplies:  Triple lumen    Ultrasound guidance: yes      Sterile ultrasound techniques: Sterile gel and sterile probe covers were used      Number of attempts:  2    Successful placement: yes    Post-procedure details:     Post-procedure:  Dressing applied and line sutured    Assessment:  Blood  return through all ports, free fluid flow, no pneumothorax on x-ray and placement verified by x-ray    Procedure completion:  Tolerated well, no immediate complications  Insert Arterial Line    Date/Time: 11/9/2022 2:18 PM  Performed by: Kaylah Wakefield MD  Authorized by: Kaylah Wakefield MD     Consent:     Consent obtained:  Emergent situation  Universal protocol:     Patient identity confirmed:  Anonymous protocol, patient vented/unresponsive  Indications:     Indications: hemodynamic monitoring and multiple ABGs    Pre-procedure details:     Skin preparation:  Chlorhexidine  Anesthesia:     Anesthesia method:  None  Procedure details:     Location:  L radial    Dagoberto's test performed: yes      Number of attempts:  2  Post-procedure details:     Post-procedure:  Sterile dressing applied and sutured    Procedure completion:  Tolerated well, no immediate complications  Intubation    Date/Time: 11/9/2022 2:19 PM  Performed by: Kaylah Wakefield MD  Authorized by: Kaylah Wakefield MD     Consent:     Consent obtained:  Emergent situation  Universal protocol:     Patient identity confirmed:  Anonymous protocol, patient vented/unresponsive  Pre-procedure details:     Indication: failure to oxygenate, failure to protect airway and failure to ventilate      Patient status:  Unresponsive    Induction agents:  None    Paralytics:  None  Procedure details:     Preoxygenation:  Bag valve mask    CPR in progress: no      Intubation method:  Oral    Intubation technique: video assisted      Laryngoscope blade:  Mac 3    Bougie used: no      Tube size (mm):  7.5    Tube type:  Cuffed    Number of attempts:  1    Ventilation between attempts: no      Tube visualized through cords: yes    Placement assessment:     ETT at teeth/gumline (cm):  23    Tube secured with:  ETT lr    Breath sounds:  Equal    Placement verification: chest rise, CXR verification, direct visualization, equal breath sounds, tube exhalation and  waveform ETCO2      CXR findings:  Appropriate position  Post-procedure details:     Procedure completion:  Tolerated well, no immediate complications               ED Course      Patient arrived in cardiac arrest.  Patient was in PEA.  Patient was given a round of epinephrine.  ROSC was obtained.  Please see nurses note for complete ACLS protocol.    I then proceeded to intubate the patient and I placed a central line and arterial line.  A code chill was called.  Please see procedure notes.     Lab Results (last 24 hours)     Procedure Component Value Units Date/Time    CBC & Differential [[id number]]  (Abnormal) Collected: 11/09/22 1213    Specimen: Blood Updated: 11/09/22 1330    Narrative:      The following orders were created for panel order CBC & Differential.  Procedure                               Abnormality         Status                     ---------                               -----------         ------                     CBC Auto Differential[[id number]]        Abnormal            Final result               Slide Review, Hematology[[id number]]                         In process                   Please view results for these tests on the individual orders.    Comprehensive Metabolic Panel [[id number]]  (Abnormal) Collected: 11/09/22 1213    Specimen: Blood Updated: 11/09/22 1325     Glucose 256 mg/dL      BUN 8 mg/dL      Creatinine 0.88 mg/dL      Sodium 136 mmol/L      Potassium 3.1 mmol/L      Comment: Slight hemolysis detected by analyzer. Results may be affected.        Chloride 102 mmol/L      CO2 16.0 mmol/L      Calcium 8.3 mg/dL      Total Protein 6.1 g/dL      Albumin 2.90 g/dL      ALT (SGPT) 7 U/L      AST (SGOT) 17 U/L      Alkaline Phosphatase 79 U/L      Total Bilirubin 0.3 mg/dL      Globulin 3.2 gm/dL      A/G Ratio 0.9 g/dL      BUN/Creatinine Ratio 9.1     Anion Gap 18.0 mmol/L      eGFR 83.2 mL/min/1.73      Comment: National Kidney Foundation and American Society of Nephrology  (ASN) Task Force recommended calculation based on the Chronic Kidney Disease Epidemiology Collaboration (CKD-EPI) equation refit without adjustment for race.       Narrative:      GFR Normal >60  Chronic Kidney Disease <60  Kidney Failure <15      Protime-INR [[id number]]  (Abnormal) Collected: 11/09/22 1213    Specimen: Blood Updated: 11/09/22 1314     Protime 15.6 Seconds      INR 1.29    aPTT [[id number]]  (Abnormal) Collected: 11/09/22 1213    Specimen: Blood Updated: 11/09/22 1314     PTT 39.6 seconds     Lipase [[id number]]  (Normal) Collected: 11/09/22 1213    Specimen: Blood Updated: 11/09/22 1320     Lipase 28 U/L     Troponin [[id number]]  (Normal) Collected: 11/09/22 1213    Specimen: Blood Updated: 11/09/22 1320     Troponin T 0.021 ng/mL     Narrative:      Troponin T Reference Range:  <= 0.03 ng/mL-   Negative for AMI  >0.03 ng/mL-     Abnormal for myocardial necrosis.  Clinicians would have to utilize clinical acumen, EKG, Troponin and serial changes to determine if it is an Acute Myocardial Infarction or myocardial injury due to an underlying chronic condition.       Results may be falsely decreased if patient taking Biotin.      Blood Culture - Blood, Arm, Right [[id number]] Collected: 11/09/22 1213    Specimen: Blood from Arm, Right Updated: 11/09/22 1317    Lactic Acid, Plasma [[id number]]  (Abnormal) Collected: 11/09/22 1213    Specimen: Blood Updated: 11/09/22 1329     Lactate 7.1 mmol/L     Procalcitonin [[id number]]  (Normal) Collected: 11/09/22 1213    Specimen: Blood Updated: 11/09/22 1330     Procalcitonin 0.05 ng/mL     Narrative:      As a Marker for Sepsis (Non-Neonates):    1. <0.5 ng/mL represents a low risk of severe sepsis and/or septic shock.  2. >2 ng/mL represents a high risk of severe sepsis and/or septic shock.    As a Marker for Lower Respiratory Tract Infections that require antibiotic therapy:    PCT on Admission    Antibiotic Therapy       6-12 Hrs later    >0.5                 "Strongly Recommended  >0.25 - <0.5        Recommended   0.1 - 0.25          Discouraged              Remeasure/reassess PCT  <0.1                Strongly Discouraged     Remeasure/reassess PCT    As 28 day mortality risk marker: \"Change in Procalcitonin Result\" (>80% or <=80%) if Day 0 (or Day 1) and Day 4 values are available. Refer to http://www.[employer]-pct-calculator.com    Change in PCT <=80%  A decrease of PCT levels below or equal to 80% defines a positive change in PCT test result representing a higher risk for 28-day all-cause mortality of patients diagnosed with severe sepsis for septic shock.    Change in PCT >80%  A decrease of PCT levels of more than 80% defines a negative change in PCT result representing a lower risk for 28-day all-cause mortality of patients diagnosed with severe sepsis or septic shock.       Ethanol [[id number]] Collected: 11/09/22 1213    Specimen: Blood Updated: 11/09/22 1320     Ethanol % <0.010 %     Narrative:      Not for legal purposes. Chain of Custody not followed.     TSH [[id number]]  (Abnormal) Collected: 11/09/22 1213    Specimen: Blood Updated: 11/09/22 1331     TSH 41.480 uIU/mL     CBC Auto Differential [[id number]]  (Abnormal) Collected: 11/09/22 1213    Specimen: Blood Updated: 11/09/22 1330     WBC 16.40 10*3/mm3      RBC 4.00 10*6/mm3      Hemoglobin 9.8 g/dL      Hematocrit 34.1 %      MCV 85.3 fL      MCH 24.5 pg      MCHC 28.7 g/dL      RDW 15.8 %      RDW-SD 49.3 fl      MPV 10.7 fL      Platelets 247 10*3/mm3     Manual Differential [[id number]]  (Abnormal) Collected: 11/09/22 1213    Specimen: Blood Updated: 11/09/22 1329     Neutrophil % 35.2 %      Lymphocyte % 43.8 %      Monocyte % 3.1 %      Eosinophil % 1.6 %      Bands %  7.8 %      Metamyelocyte % 3.9 %      Myelocyte % 1.6 %      Atypical Lymphocyte % 0.8 %      Neutrophils Absolute 7.05 10*3/mm3      Lymphocytes Absolute 7.31 10*3/mm3      Monocytes Absolute 0.51 10*3/mm3      Eosinophils Absolute 0.26 " 10*3/mm3      nRBC 2.3 /100 WBC      Anisocytosis Mod/2+     Crenated RBC's Large/3+     Microcytes Slight/1+     Ovalocytes Slight/1+     Poikilocytes Large/3+     Polychromasia Mod/2+     WBC Morphology Normal     Platelet Estimate Adequate    BNP [[id number]]  (Abnormal) Collected: 11/09/22 1213    Specimen: Blood Updated: 11/09/22 1342     proBNP 14,804.0 pg/mL     Narrative:      Among patients with dyspnea, NT-proBNP is highly sensitive for the detection of acute congestive heart failure. In addition NT-proBNP of <300 pg/ml effectively rules out acute congestive heart failure with 99% negative predictive value.      Slide Review, Hematology [[id number]] Collected: 11/09/22 1213    Specimen: Blood Updated: 11/09/22 1328    Magnesium [[id number]] Collected: 11/09/22 1213    Specimen: Blood Updated: 11/09/22 1415    Blood Gas, Arterial - [[id number]]  (Abnormal) Collected: 11/09/22 1306    Specimen: Arterial Blood Updated: 11/09/22 1304     Site Arterial Line     Dagoberto's Test N/A     pH, Arterial 7.162 pH units      Comment: 85 Value below critical limit        pCO2, Arterial 43.8 mm Hg      pO2, Arterial 66.0 mm Hg      Comment: 84 Value below reference range        HCO3, Arterial 15.7 mmol/L      Comment: 84 Value below reference range        Base Excess, Arterial -12.4 mmol/L      Comment: 84 Value below reference range        O2 Saturation, Arterial 86.6 %      Comment: 84 Value below reference range        Temperature 37.0 C      Barometric Pressure for Blood Gas 756 mmHg      Modality Ventilator     FIO2 65 %      Ventilator Mode AC     Set Tidal Volume 500     Set Mech Resp Rate 20.0     PEEP 5.0     Notified Who DR NEWSOME     Notified By [id number]     Notified Time 11/09/2022 13:08     Collected by [id number]     Comment: Meter: [id number]     :  [id number]        pCO2, Temperature Corrected 43.8 mm Hg      pH, Temp Corrected 7.162 pH Units      pO2, Temperature Corrected 66.0 mm Hg     Urinalysis With  Culture If Indicated - Urine, Catheter [[id number]] Collected: 11/09/22 1342    Specimen: Urine, Catheter Updated: 11/09/22 1356    Urinalysis, Microscopic Only - Urine, Catheter [[id number]] Collected: 11/09/22 1342    Specimen: Urine, Catheter Updated: 11/09/22 1405    POC Urine Pregnancy [[id number]]  (Normal) Resulted: 11/09/22 1345    Specimen: Urine Updated: 11/09/22 1347     HCG, Urine, QL Negative     Lot Number [id number]     Internal Positive Control Positive     Internal Negative Control Negative     Expiration Date 02/29/2024        CT Head Without Contrast   Final Result   Impression:     1. No acute intracranial process.   This report was finalized on 11/09/2022 14:02 by Dr Antonio Ahmadi, .      CT Angiogram Chest   Final Result   1. No evidence of pulmonary embolus.   2. Enlarged left ventricle. Pulmonary edema with bilateral small   layering pleural effusions.   3. Endotracheal tube is in good position.           This report was finalized on 11/09/2022 14:11 by Dr Antonio Ahmadi, .      XR Chest 1 View   Final Result   1. Endotracheal tube is in good position. Lungs are well expanded.   2. Bilateral central predominant hazy pulmonary infiltrates concerning   for pulmonary edema.   3. Central venous catheter is in good position.           This report was finalized on 11/09/2022 13:03 by Dr Antonio Ahmadi, .          There was concern for seizure-like activity.  Patient was placed on a propofol drip and was given a milligram of Ativan.  Blood pressure slightly low.  Patient was started on Levophed.  Dr. Erine Back with neurology was consulted and a stat EEG was ordered.  Labs showed metabolic acidosis with an elevated lactate.  Also showed leukocytosis, elevated TSH and hyperglycemia.  Patient also had mild hypokalemia.  Chest x-ray showed ET tube to be in good position.  Patient had findings concerning for pulmonary edema.  She was given a dose of Lasix.  Central venous catheter was in good position.   CT scan of the head showed no acute findings.  CTA of the chest showed no evidence of pulmonary embolus.  Patient did have an enlarged left ventricle with pulmonary edema and bilateral small layering pleural effusions.  Patient was admitted to the ICU by Dr. Rodriguez with the hosptialist service for further treatment.                                MDM    Final diagnoses:   Cardiac arrest (HCC)   Acute pulmonary edema (HCC)       ED Disposition  ED Disposition     ED Disposition   Decision to Admit    Condition   --    Comment   Level of Care: Critical Care [6]   Diagnosis: Cardiac arrest (HCC) [427.5.ICD-9-CM]   Admitting Physician: NATASHA RODRIGUEZ [[id number]]   Attending Physician: NATASHA RODRIGUEZ [[id number]]   Certification: I Certify That Inpatient Hospital Services Are Medically Necessary For Greater Than 2 Midnights               No follow-up provider specified.       Medication List      No changes were made to your prescriptions during this visit.          Kaylah Wakefield MD  11/09/22 5182

## 2022-11-10 NOTE — PROGRESS NOTES
[facility] Medicine Services  INPATIENT PROGRESS NOTE    Patient Name: Trina Ro  Date of Admission: 11/9/2022  Today's Date: 11/10/22  Length of Stay: 1  Primary Care Physician: Franco Pascual MD    Subjective   Chief Complaint: f/u     HPI   Dr. Pereira yesterday after looking continuously on the EEG decided to start back on propofol and place the patient on Keppra.  She mentioned that she looks hyper irritable at the left central parietal.    Hemodynamically stable  Temperature 100.6 T-max at 2 AM.  White count increased to 21,000  Noted to have been started on Zosyn likely since last night  Given yesterday at the dose of ceftriaxone empirically until things are sorted out  I gave a dose of Lasix yesterday as CT of the chest reported concerning for pulmonary edema.  Follow-up chest x-ray reported mild improving perihilar opacities favoring improving but residual edema  Echocardiogram done yesterday.    Results for orders placed during the hospital encounter of 11/09/22    Adult Transthoracic Echo Complete W/ Cont if Necessary Per Protocol    Interpretation Summary  •  Left ventricular systolic function is severely decreased. Left ventricular ejection fraction appears to be 26 - 30%.  •  Right ventricle not well visualized but appears to have grossly normal size and systolic function.  •  Cardiac valves are poorly visualized, however no obvious abnormalities by Doppler assessment.    EF at 26 to 30%.  No prior echo for comparison  Troponin follow-up is 0.297    Just had seizure off propofol     Review of Systems   Unable to participate in her care    Objective    Temp:  [97.8 °F (36.6 °C)-100.6 °F (38.1 °C)] 99.9 °F (37.7 °C)  Heart Rate:  [] 99  Resp:  [17-26] 26  BP: ()/(62-93) 109/74  Arterial Line BP: ()/(35-81) 105/70  FiO2 (%):  [40 %-80 %] 40 %  Physical Exam  GEN: Intubated off  propofol  Mechanically ventilated  HEENT: Atraumatic,  eyeballs appears upwardly located   Trachea midline  Lungs: CTAB, no wheezing/rales/rhonchi  Heart: s1 s2 rrr sinus ryhthm at 96  ABD: soft, nt/nd, +BS, no guarding/rebound  Extremities: atraumatic, no cyanosis, no edema; withdraws to pain   Yellow urine output in bag. Reportedly decrease output in general - will monitor  Skin: no rashes or lesions  Neuro: intubated  Psych: Flat affect      Results Review:  I have reviewed the labs, radiology results, and diagnostic studies.    Laboratory Data:   Results from last 7 days   Lab Units 11/10/22  0430 11/09/22  1213   WBC 10*3/mm3 21.24* 16.40*   HEMOGLOBIN g/dL 9.9* 9.8*   HEMATOCRIT % 32.4* 34.1   PLATELETS 10*3/mm3 279 247        Results from last 7 days   Lab Units 11/10/22  0430 11/09/22  2315 11/09/22  1213   SODIUM mmol/L 135*  --  136   POTASSIUM mmol/L 3.6 3.6 3.1*   CHLORIDE mmol/L 104  --  102   CO2 mmol/L 19.0*  --  16.0*   BUN mg/dL 15  --  8   CREATININE mg/dL 1.01*  --  0.88   CALCIUM mg/dL 7.9*  --  8.3*   BILIRUBIN mg/dL 0.3  --  0.3   ALK PHOS U/L 69  --  79   ALT (SGPT) U/L 14  --  7   AST (SGOT) U/L 30  --  17   GLUCOSE mg/dL 220*  --  256*       Culture Data:   No results found for: BLOODCX, URINECX, WOUNDCX, MRSACX, RESPCX, STOOLCX    Radiology Data:   Imaging Results (Last 24 Hours)     Procedure Component Value Units Date/Time    XR Chest 1 View [[id number]] Collected: 11/10/22 0740     Updated: 11/10/22 0745    Narrative:      HISTORY: Intubated, lung infiltrates     CXR: A frontal view the chest obtained. Patient rotated to the left.     COMPARISON: 11/9/2022     FINDINGS: Endotracheal tube tip appropriate in position at the T2/T3  level. Enteric tube descends into the abdomen. Right IJ central line in  place with tip overlying the SVC.     Stable mild cardiomegaly. Small left pleural effusion. Perihilar  densities favoring mild edema, improved from yesterday's exam. No  pneumothorax.       Impression:      1. Appropriate line support lines.  Interval placement of an enteric  tube.  2. Mild improving perihilar opacities favoring improving but residual  edema. Small left pleural effusion. Stable mediastinal contours.  This report was finalized on 11/10/2022 07:42 by Dr. Kayla Fallon MD.    XR Abdomen KUB [[id number]] Collected: 11/10/22 0718     Updated: 11/10/22 0722    Narrative:      XR ABDOMEN KUB- 11/9/2022 10:08 PM CST     HISTORY: og placement; I46.9-Cardiac arrest, cause unspecified;  J81.0-Acute pulmonary edema     FINDINGS:  Feeding tube is seen with the tip located in the lower  gastric body     The visualized intestinal gas pattern is unremarkable without evidence  of obstruction.  Consolidation is seen in the retrocardiac space.  Cholecystectomy clips.          Impression:      Feeding tube tip located in the lower gastric body.     This report was finalized on 11/10/2022 07:18 by Dr Antonio Ahmadi, .    CT Angiogram Chest [[id number]] Collected: 11/09/22 1406     Updated: 11/09/22 1414    Narrative:      CT ANGIOGRAM CHEST- 11/9/2022 1:47 AM CST      HISTORY: Pulmonary embolism (PE) suspected, unknown D-dimer      COMPARISON: Chest x-ray dated 11/9/2022.      DOSE LENGTH PRODUCT: 247 mGy cm. Automated exposure control was also  utilized to decrease patient radiation dose.     TECHNIQUE: Helical tomographic images of the chest were obtained after  the administration of intravenous contrast following angiogram protocol.  Additionally, 3D and multiplanar reformatted images were provided.        FINDINGS:    Pulmonary arteries: There is adequate enhancement of the pulmonary  arteries to evaluate for central and segmental pulmonary emboli. There  are no filling defects within the main, lobar, segmental or visualized  subsegmental pulmonary arteries. The pulmonary arteries are within  normal limits for size.      Aorta and great vessels: Thoracic aorta is normal in caliber. No  dissection identified. No flow-limiting stenosis identified at the  great  vessel origins.     Visualized neck base: The imaged portion of the base of the neck appears  unremarkable.      Lungs: Bilateral septal thickening and mixed groundglass and  consolidative dependent airspace opacities reflecting pulmonary edema.  There are bilateral small layering pleural effusions. Endotracheal tube  is present with tip approximately 4.2 cm above the lori. No  pneumothorax. Minimal secretions in the trachea. Airways are otherwise  clear.     Heart: Enlarged left ventricle. There is no pericardial effusion.      Mediastinum and lymph nodes: No suspicious hilar or mediastinal  adenopathy..     Skeletal and soft tissues: Chest wall soft tissues are unremarkable. No  acute bony abnormality.       Upper abdomen: The imaged portion of the upper abdomen demonstrates no  acute process.        Impression:      1. No evidence of pulmonary embolus.  2. Enlarged left ventricle. Pulmonary edema with bilateral small  layering pleural effusions.  3. Endotracheal tube is in good position.        This report was finalized on 11/09/2022 14:11 by Dr Antonio Ahmadi, .    CT Head Without Contrast [[id number]] Collected: 11/09/22 1400     Updated: 11/09/22 1406    Narrative:      CT HEAD WO CONTRAST- 11/9/2022 1:47 AM CST     HISTORY: Neuro deficit, acute, stroke suspected       DOSE LENGTH PRODUCT: 1083 mGy cm. Automated exposure control was also  utilized to decrease patient radiation dose.     Technique:   Axial CT of the brain without IV contrast. Sagittal and coronal  reformations are also provided for review. Soft tissue and bone kernels  are available for interpretation.     Comparison: None.     Findings:      There is no evidence of acute large vascular territory infarct. No  intra-axial or extra-axial hemorrhage. No visualized mass lesion or mass  effect. The ventricles, cortical sulci and basal cisterns are symmetric  and age appropriate.  Posterior fossa structures are unremarkable. The  scalp and  calvarium are intact. Visualized paranasal sinuses and  mastoids are clear.        Impression:      Impression:    1. No acute intracranial process.  This report was finalized on 11/09/2022 14:02 by Dr Antonio Ahmadi, .    XR Chest 1 View [[id number]] Collected: 11/09/22 1300     Updated: 11/09/22 1306    Narrative:      XR CHEST 1 VW- 11/9/2022 12:40 PM CST     HISTORY: CVC placement, ETT placement verification     COMPARISON: None.     FINDINGS:      Status post endotracheal intubation. Endotracheal tube is in good  position. There is a right IJ central venous catheter with tip  projecting over the right atrium. Cardiomegaly is present. There are  bilateral hazy pulmonary infiltrates, central predominant, suggesting  pulmonary edema. Lungs are well expanded. No pleural effusion or  pneumothorax. Defibrillator pad is in place. No acute bony abnormality  seen.       Impression:      1. Endotracheal tube is in good position. Lungs are well expanded.  2. Bilateral central predominant hazy pulmonary infiltrates concerning  for pulmonary edema.  3. Central venous catheter is in good position.        This report was finalized on 11/09/2022 13:03 by Dr Antonio Ahmadi, .          I have reviewed the patient's current medications.     Assessment/Plan     Active Hospital Problems    Diagnosis    • **Cardiac arrest (HCC)      Problem list    • seizure  • Encephalopathy possibly from seizure versus other etiologies  • Status post cardiorespiratory resuscitation.  Reportedly PEA.  Initially placed on code chill protocol.  After reviewing the inclusion and exclusion criteria, patient believed to have an exclusion criteria for code chill (suspected seizures/status epilepticus, initiation of BLS greater than 10 minutes); no significant elevation of troponin despite multiple defibrillation  • Cardiomyopathy/pulmonary edema acute EF 26 to 30%  • Acute respiratory failure requiring mechanical ventilation  • Elevated BNP - cardiomyopathy  - etiology unclear  • Shock (etiology unclear) -Levophed  • Hypokalemia replace potassium  • ? Hx of Hep c base on med from home ([place])  • Elevated thyroid stimulating hormone - ? Hx of thyroid cancer with thyroidectomy - on supplemental HRT. Changed to iv dose ~ half of normal PO dose  • ? Medical compliance  • Lactic acidosis (resolved) from seizure, respiratory failure -   • Hyperglycemia - ? Hx of DM  · Folic acid deficiency  Plan:  Antiepileptic drugs per neurology; continues EEG per neurology  Cardiology consult  Pulmonary on vent management  Diuretic; follow fluid and elevtrolyte status  Limit fluid  Levophed is off since 2 AM toay  Replace potassium  On empiric abx SIRS (setting of seizure)  Results for orders placed during the hospital encounter of 11/09/22    Adult Transthoracic Echo Complete W/ Cont if Necessary Per Protocol    Interpretation Summary  •  Left ventricular systolic function is severely decreased. Left ventricular ejection fraction appears to be 26 - 30%.  •  Right ventricle not well visualized but appears to have grossly normal size and systolic function.  •  Cardiac valves are poorly visualized, however no obvious abnormalities by Doppler assessment.      Critical care time > 30 mins    artificial tears, , Both Eyes, Q4H  budesonide, 0.5 mg, Nebulization, BID - RT  Chlorhexidine Gluconate Cloth, 1 application, Topical, Q24H  enoxaparin, 40 mg, Subcutaneous, Daily  famotidine, 20 mg, Intravenous, Q12H  ipratropium-albuterol, 3 mL, Nebulization, 4x Daily - RT  levETIRAcetam, 1,000 mg, Intravenous, Q12H  levothyroxine sodium, 88 mcg, Intravenous, Daily  methylPREDNISolone sodium succinate, 40 mg, Intravenous, Q8H  mupirocin, 1 application, Each Nare, BID  piperacillin-tazobactam, 4.5 g, Intravenous, Q8H  sodium chloride, 10 mL, Intravenous, Q12H          Discharge Planning:tbd  Electronically signed by Usama Rodriguez MD, 11/10/22, 08:06 CST.

## 2022-11-10 NOTE — PLAN OF CARE
Goal Outcome Evaluation: Propofol maintained at 50. No evidence of pain. MRI obtained. Adequate UOP through hardy. Art line d/c. Minimal vent settings. No purposeful movement noted. VSS.

## 2022-11-10 NOTE — NURSING NOTE
3 sliver rings. One valentin wedding band, valenitn band and a plain sliver ring. Gave to pts  Joseph. Pt mother present as well. Witnessed by Consuelo GARCIA

## 2022-11-10 NOTE — CASE MANAGEMENT/SOCIAL WORK
Discharge Planning Assessment  [facility]     Patient Name: Trina Ro  MRN: [id number]  Today's Date: 11/10/2022    Admit Date: 11/9/2022        Discharge Needs Assessment     Row Name 11/10/22 0934       Living Environment    People in Home spouse    Name(s) of People in Home Joseph Benjamin    Current Living Arrangements home    Primary Care Provided by self    Provides Primary Care For no one    Family Caregiver if Needed spouse;parent(s)    Quality of Family Relationships supportive;helpful;involved    Able to Return to Prior Arrangements yes       Resource/Environmental Concerns    Resource/Environmental Concerns none       Transition Planning    Patient/Family Anticipated Services at Transition        Discharge Needs Assessment    Readmission Within the Last 30 Days no previous admission in last 30 days    Equipment Currently Used at Home none    Concerns to be Addressed discharge planning    Discharge Coordination/Progress Patient currently admitted to CCU on vent.  Needs/plan unclear, SW will follow and assist as needed.  Patient was independent at home, residing with her spouse, has a PCP and rX coverage.               Discharge Plan    No documentation.               Continued Care and Services - Admitted Since 11/9/2022    Coordination has not been started for this encounter.          Demographic Summary    No documentation.                Functional Status    No documentation.                Psychosocial    No documentation.                Abuse/Neglect    No documentation.                Legal    No documentation.                Substance Abuse    No documentation.                Patient Forms    No documentation.                   KIRSTIE Turner

## 2022-11-10 NOTE — PROGRESS NOTES
RT EQUIPMENT DEVICE RELATED - SKIN ASSESSMENT    RT Medical Equipment/Device:     ETT Robles/[employer]    Skin Assessment:      Lips:  Intact    Device Skin Pressure Protection:  Pressure points protected   ETT position changed q 4 hrs
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
## 2022-11-10 NOTE — PAYOR COMM NOTE
"ADMIT 11/9/2022    [facility]  DINORAH  [phone]  OR  FAX  [phone]    April Mukherjee (42 y.o. Female)     Date of Birth   1980    Social Security Number       Address   [address] [place] [address] [address] [facility] [address]    Home Phone   [phone]    MRN   [id number]       Sab[employer]ism   [employer]    Marital Status                               Admission Date   11/9/22    Admission Type   Emergency    Admitting Provider   Usama Rodriguez MD    Attending Provider   Usama Rodriguez MD    Department, Room/Bed   [facility] CARDIAC CARE, C008/1       Discharge Date       Discharge Disposition       Discharge Destination                               Attending Provider: Usama Rodriguez MD    Allergies: No Known Allergies    Isolation: None   Infection: None   Code Status: CPR    Ht: 167.6 cm (66\")   Wt: 95.5 kg (210 lb 8 oz)    Admission Cmt: None   Principal Problem: Cardiac arrest (HCC) [I46.9]                 Active Insurance as of 11/9/2022     Primary Coverage     Payor Plan Insurance Group Employer/Plan Group    [employer] [place] [employer] [place]ICA[place]      Payor Plan Address Payor Plan Phone Number Payor Plan Fax Number Effective Dates    [address] [address] [phone]  9/9/2016 - None Entered    [place] [address]       Subscriber Name Subscriber Birth Date Member ID       APRIL MUKHERJEE 1980 [id number]                 Emergency Contacts      (Rel.) Home Phone Work Phone Mobile Phone    SouravJoseph (Spouse) -- -- [phone]    Martha Mukherjee (Mother) -- -- [phone]               History & Physical      Usama Rodriguez MD at 11/09/22 [facility]              [facility] Medicine Services  HISTORY AND PHYSICAL    Date of Admission: 11/9/2022  Primary Care Physician: Franco Pascual MD    Subjective     Chief Complaint: Status post cardiorespiratory resuscitation  History of Present " "Illness  Patient unable to give any historical details due to current condition  Case discussed with Dr. Kaylah Wakefield of emergency room.  I also spoke to the nurses caring for her.  I was able to speak to Dr. Pereira as well  Patient reportedly is 42-year-old.  Yesterday she choked on pork chop and had some vomitus described to be \"black material\"  According to Dr. Wakefield patient has history of seizure.  Patient reportedly gurgling and then fell and then subsequently had CPR.  It was not clear when was the actual CPR initiated but based on information gathered from there is this it likely is greater than 10 minutes.  I am informed that the fire department came in for his.  Patient had 4 episodes of defibrillation.  I do not have any rhythm strip during the event.  Dr. Wakefield mentioned that patient has PEA when she arrived in the emergency room  She gave a dose of epinephrine.  Pulse regained  Patient was initiated on code chill.    Patient seen at bedside.  She is going for head CT scan  Her blood pressure is in the 70s with mean arterial pressure of 65.  Her distal extremities is warm  No gross cyanosis  She is biting hard on the ET tube  She is mechanically ventilated with setting of assist-control, rate of 20, tidal volume 500, FiO2 of 80 and 8 PEEP  Her blood pressure on arterial line is 74/52    I ordered for bolus of 500 cc fluid.  She actually received a liter already out of the 1.5 L requested by ER    There is an EKG ordered but has not been done yet.    Diagnostic studies:  pH of 7.16, PCO2 44, PaO2 of 66 on assist control, tidal volume 500 rate of 20, PEEP 5 and FiO2 of 65%  Potassium 3.1, anion gap 18, CO2 of 16, sugar 256, albumin 2.9  PT PTT are 15.6 and 39.6 respectively  White count is 16.4 with predominance of lymphocytes, there is presence of bands  proBNP 14,800  Chest x-ray: ET tube in place lungs well expanded haziness bilateral centrally concerning for pulmonary edema.        Review of Systems "   Unble to give any historical details    Past Medical History:   Past Medical History:   Diagnosis Date   • Anxiety state    • Cancer (HCC)     THYROID   • Endogenous obesity    • History of malignant neoplasm of thyroid    • Postoperative hypothyroidism    • Type 2 diabetes mellitus (HCC)    • Vitamin D deficiency      Past Surgical History:  Past Surgical History:   Procedure Laterality Date   • CHOLECYSTECTOMY     • THYROIDECTOMY     • TONSILLECTOMY       Social History:  reports that she has been smoking. She has never used smokeless tobacco. She reports that she does not drink alcohol and does not use drugs.    Family History: family history includes Breast cancer in an other family member; Cholelithiasis in an other family member; Diabetes in an other family member; Heart disease in an other family member; Hypertension in an other family member; Kidney disease in an other family member; Pancreatic cancer in an other family member; Prostate cancer in her father; Stroke in her paternal grandmother and another family member; Thyroid disease in an other family member.        Allergies:  No Known Allergies    Medications:  Prior to Admission medications    Medication Sig Start Date End Date Taking? Authorizing Provider   Alogliptin Benzoate 12.5 MG tablet Take 1 tablet by mouth Daily. 2/11/19   Siddhartha Cole APRN   busPIRone (BUSPAR) 5 MG tablet Take  by mouth. TAKE 1-2 TABLETS BY MOUTH TID    Reji Sawyer MD   cetirizine (zyrTEC) 10 MG tablet Take 10 mg by mouth Daily.    Reji Sawyer MD   DULoxetine (Cymbalta) 60 MG capsule Take 60 mg by mouth Daily.    Reji Sawyer MD   gabapentin (NEURONTIN) 100 MG capsule Take 200 mg by mouth 3 (Three) Times a Day.    Reji Sawyer MD   Glecaprevir-Pibrentasvir (Mavyret) 100-40 MG tablet Take 3 tablets by mouth Daily. 5/14/20   Dani Mott MD   hydrochlorothiazide (HYDRODIURIL) 25 MG tablet Take 25 mg by mouth daily.     "Reji Sawyer MD   hydrOXYzine pamoate (VISTARIL) 25 MG capsule  2/20/20   Reji Sawyer MD   levothyroxine (SYNTHROID, LEVOTHROID) 175 MCG tablet TAKE ONE TABLET BY MOUTH EVERY DAY EXCEPT ON SUNDAY TAKE TWO TABLETS BY MOUTH 12/13/18   Siddhartha Cole APRN   lisinopril (PRINIVIL,ZESTRIL) 10 MG tablet Take 10 mg by mouth Daily.    Reji Sawyer MD   metFORMIN (GLUCOPHAGE) 1000 MG tablet Take 1 tablet by mouth 2 (Two) Times a Day With Meals. 3/7/18   Siddhartha Cole APRN   montelukast (SINGULAIR) 10 MG tablet Take 10 mg by mouth.    Reji Sawyer MD   prazosin (MINIPRESS) 1 MG capsule Take 1 mg by mouth Every Night.    Reji Sawyer MD   traZODone (DESYREL) 50 MG tablet Take 50 mg by mouth Every Night.    Reji Sawyer MD     I have utilized all available immediate resources to obtain, update, and review the patient's current medications.    Objective     Vital Signs: /92   Pulse 120   Temp 97.9 °F (36.6 °C) (Axillary)   Resp 17   Ht 167.6 cm (66\")   Wt 98.4 kg (217 lb)   SpO2 96%   BMI 35.02 kg/m²   Physical Exam   GEN: Intubated sedated on 50 mcg of propofol  Mechanically ventilated  HEENT: Atraumatic, sluggishly reactive pupil measuring 3 to 4 mm anicteric, Trachea midline  Biting tube  Lungs: CTAB, no wheezing/rales/rhonchi  Heart: Tachycardic +S1/s2, no rub patient apparently not hooked to the telemetry to read  ABD: soft, nt/nd, +BS, no guarding/rebound  Extremities: atraumatic, no cyanosis, no edema  Skin: no rashes or lesions  Neuro: Sedated intubated  Psych: Flat affect  Patient does not appear to be fluid overloaded and probably clinically needing IV fluids as a bolus her with fluid I also stopped the Lasix particularly in shock  Other description shown of exam as shown in the H&P  Results Reviewed:  Lab Results (last 24 hours)     Procedure Component Value Units Date/Time    POC Urine Pregnancy [[id number]]  (Normal) Resulted: " "11/09/22 1345    Specimen: Urine Updated: 11/09/22 1347     HCG, Urine, QL Negative     Lot Number [id number]     Internal Positive Control Positive     Internal Negative Control Negative     Expiration Date 02/29/2024    BNP [[id number]]  (Abnormal) Collected: 11/09/22 1213    Specimen: Blood Updated: 11/09/22 1342     proBNP 14,804.0 pg/mL     Narrative:      Among patients with dyspnea, NT-proBNP is highly sensitive for the detection of acute congestive heart failure. In addition NT-proBNP of <300 pg/ml effectively rules out acute congestive heart failure with 99% negative predictive value.      TSH [[id number]]  (Abnormal) Collected: 11/09/22 1213    Specimen: Blood Updated: 11/09/22 1331     TSH 41.480 uIU/mL     Procalcitonin [[id number]]  (Normal) Collected: 11/09/22 1213    Specimen: Blood Updated: 11/09/22 1330     Procalcitonin 0.05 ng/mL     Narrative:      As a Marker for Sepsis (Non-Neonates):    1. <0.5 ng/mL represents a low risk of severe sepsis and/or septic shock.  2. >2 ng/mL represents a high risk of severe sepsis and/or septic shock.    As a Marker for Lower Respiratory Tract Infections that require antibiotic therapy:    PCT on Admission    Antibiotic Therapy       6-12 Hrs later    >0.5                Strongly Recommended  >0.25 - <0.5        Recommended   0.1 - 0.25          Discouraged              Remeasure/reassess PCT  <0.1                Strongly Discouraged     Remeasure/reassess PCT    As 28 day mortality risk marker: \"Change in Procalcitonin Result\" (>80% or <=80%) if Day 0 (or Day 1) and Day 4 values are available. Refer to http://www.[facility]-pct-calculator.com    Change in PCT <=80%  A decrease of PCT levels below or equal to 80% defines a positive change in PCT test result representing a higher risk for 28-day all-cause mortality of patients diagnosed with severe sepsis for septic shock.    Change in PCT >80%  A decrease of PCT levels of more than 80% defines a negative change in PCT " result representing a lower risk for 28-day all-cause mortality of patients diagnosed with severe sepsis or septic shock.       CBC & Differential [[id number]]  (Abnormal) Collected: 11/09/22 1213    Specimen: Blood Updated: 11/09/22 1330    Narrative:      The following orders were created for panel order CBC & Differential.  Procedure                               Abnormality         Status                     ---------                               -----------         ------                     CBC Auto Differential[[id number]]        Abnormal            Final result               Slide Review, Hematology[[id number]]                         In process                   Please view results for these tests on the individual orders.    CBC Auto Differential [[id number]]  (Abnormal) Collected: 11/09/22 1213    Specimen: Blood Updated: 11/09/22 1330     WBC 16.40 10*3/mm3      RBC 4.00 10*6/mm3      Hemoglobin 9.8 g/dL      Hematocrit 34.1 %      MCV 85.3 fL      MCH 24.5 pg      MCHC 28.7 g/dL      RDW 15.8 %      RDW-SD 49.3 fl      MPV 10.7 fL      Platelets 247 10*3/mm3     Manual Differential [[id number]]  (Abnormal) Collected: 11/09/22 1213    Specimen: Blood Updated: 11/09/22 1329     Neutrophil % 35.2 %      Lymphocyte % 43.8 %      Monocyte % 3.1 %      Eosinophil % 1.6 %      Bands %  7.8 %      Metamyelocyte % 3.9 %      Myelocyte % 1.6 %      Atypical Lymphocyte % 0.8 %      Neutrophils Absolute 7.05 10*3/mm3      Lymphocytes Absolute 7.31 10*3/mm3      Monocytes Absolute 0.51 10*3/mm3      Eosinophils Absolute 0.26 10*3/mm3      nRBC 2.3 /100 WBC      Anisocytosis Mod/2+     Crenated RBC's Large/3+     Microcytes Slight/1+     Ovalocytes Slight/1+     Poikilocytes Large/3+     Polychromasia Mod/2+     WBC Morphology Normal     Platelet Estimate Adequate    Lactic Acid, Plasma [[id number]]  (Abnormal) Collected: 11/09/22 1213    Specimen: Blood Updated: 11/09/22 1329     Lactate 7.1 mmol/L     Slide Review,  Hematology [[id number]] Collected: 11/09/22 1213    Specimen: Blood Updated: 11/09/22 1328    Comprehensive Metabolic Panel [[id number]]  (Abnormal) Collected: 11/09/22 1213    Specimen: Blood Updated: 11/09/22 1325     Glucose 256 mg/dL      BUN 8 mg/dL      Creatinine 0.88 mg/dL      Sodium 136 mmol/L      Potassium 3.1 mmol/L      Comment: Slight hemolysis detected by analyzer. Results may be affected.        Chloride 102 mmol/L      CO2 16.0 mmol/L      Calcium 8.3 mg/dL      Total Protein 6.1 g/dL      Albumin 2.90 g/dL      ALT (SGPT) 7 U/L      AST (SGOT) 17 U/L      Alkaline Phosphatase 79 U/L      Total Bilirubin 0.3 mg/dL      Globulin 3.2 gm/dL      A/G Ratio 0.9 g/dL      BUN/Creatinine Ratio 9.1     Anion Gap 18.0 mmol/L      eGFR 83.2 mL/min/1.73      Comment: National Kidney Foundation and American Society of Nephrology (ASN) Task Force recommended calculation based on the Chronic Kidney Disease Epidemiology Collaboration (CKD-EPI) equation refit without adjustment for race.       Narrative:      GFR Normal >60  Chronic Kidney Disease <60  Kidney Failure <15      Troponin [[id number]]  (Normal) Collected: 11/09/22 1213    Specimen: Blood Updated: 11/09/22 1320     Troponin T 0.021 ng/mL     Narrative:      Troponin T Reference Range:  <= 0.03 ng/mL-   Negative for AMI  >0.03 ng/mL-     Abnormal for myocardial necrosis.  Clinicians would have to utilize clinical acumen, EKG, Troponin and serial changes to determine if it is an Acute Myocardial Infarction or myocardial injury due to an underlying chronic condition.       Results may be falsely decreased if patient taking Biotin.      Lipase [[id number]]  (Normal) Collected: 11/09/22 1213    Specimen: Blood Updated: 11/09/22 1320     Lipase 28 U/L     Ethanol [[id number]] Collected: 11/09/22 1213    Specimen: Blood Updated: 11/09/22 1320     Ethanol % <0.010 %     Narrative:      Not for legal purposes. Chain of Custody not followed.     Blood Culture -  Blood, Arm, Right [[id number]] Collected: 11/09/22 1213    Specimen: Blood from Arm, Right Updated: 11/09/22 1317    [place] Draw [[id number]] Collected: 11/09/22 1213    Specimen: Blood Updated: 11/09/22 1315    Narrative:      The following orders were created for panel order [place] Draw.  Procedure                               Abnormality         Status                     ---------                               -----------         ------                     Green Top (Gel)[[id number]]                                  Final result               Lavender Top[[id number]]                                     Final result               Zelaya Top[[id number]]                                         In process                 Light Blue Top[[id number]]                                   Final result                 Please view results for these tests on the individual orders.    Lavender Top [[id number]] Collected: 11/09/22 1213    Specimen: Blood Updated: 11/09/22 1315     Extra Tube hold for add-on     Comment: Auto resulted       Green Top (Gel) [[id number]] Collected: 11/09/22 1213    Specimen: Blood Updated: 11/09/22 1315     Extra Tube Hold for add-ons.     Comment: Auto resulted.       Light Blue Top [[id number]] Collected: 11/09/22 1213    Specimen: Blood Updated: 11/09/22 1315     Extra Tube Hold for add-ons.     Comment: Auto resulted       Protime-INR [[id number]]  (Abnormal) Collected: 11/09/22 1213    Specimen: Blood Updated: 11/09/22 1314     Protime 15.6 Seconds      INR 1.29    aPTT [[id number]]  (Abnormal) Collected: 11/09/22 1213    Specimen: Blood Updated: 11/09/22 1314     PTT 39.6 seconds     Blood Gas, Arterial - [[id number]]  (Abnormal) Collected: 11/09/22 1306    Specimen: Arterial Blood Updated: 11/09/22 1304     Site Arterial Line     Dagoberto's Test N/A     pH, Arterial 7.162 pH units      Comment: 85 Value below critical limit        pCO2, Arterial 43.8 mm Hg      pO2, Arterial 66.0 mm Hg      Comment: 84  Value below reference range        HCO3, Arterial 15.7 mmol/L      Comment: 84 Value below reference range        Base Excess, Arterial -12.4 mmol/L      Comment: 84 Value below reference range        O2 Saturation, Arterial 86.6 %      Comment: 84 Value below reference range        Temperature 37.0 C      Barometric Pressure for Blood Gas 756 mmHg      Modality Ventilator     FIO2 65 %      Ventilator Mode AC     Set Tidal Volume 500     Set Mech Resp Rate 20.0     PEEP 5.0     Notified Who DR NEWSOME     Notified By [id number]     Notified Time 11/09/2022 13:08     Collected by [id number]     Comment: Meter: [id number]     :  [id number]        pCO2, Temperature Corrected 43.8 mm Hg      pH, Temp Corrected 7.162 pH Units      pO2, Temperature Corrected 66.0 mm Hg     Gray Top [[id number]] Collected: 11/09/22 1213    Specimen: Blood Updated: 11/09/22 1224        Imaging Results (Last 24 Hours)     Procedure Component Value Units Date/Time    CT Head Without Contrast [[id number]] Resulted: 11/09/22 1319     Updated: 11/09/22 1319    CT Angiogram Chest [[id number]] Resulted: 11/09/22 1319     Updated: 11/09/22 1319    XR Chest 1 View [[id number]] Collected: 11/09/22 1300     Updated: 11/09/22 1306    Narrative:      XR CHEST 1 VW- 11/9/2022 12:40 PM CST     HISTORY: CVC placement, ETT placement verification     COMPARISON: None.     FINDINGS:      Status post endotracheal intubation. Endotracheal tube is in good  position. There is a right IJ central venous catheter with tip  projecting over the right atrium. Cardiomegaly is present. There are  bilateral hazy pulmonary infiltrates, central predominant, suggesting  pulmonary edema. Lungs are well expanded. No pleural effusion or  pneumothorax. Defibrillator pad is in place. No acute bony abnormality  seen.       Impression:      1. Endotracheal tube is in good position. Lungs are well expanded.  2. Bilateral central predominant hazy pulmonary infiltrates concerning  for  pulmonary edema.  3. Central venous catheter is in good position.        This report was finalized on 11/09/2022 13:03 by Dr Antonio Ahmadi, .        I have personally reviewed and interpreted the radiology studies and ECG obtained at time of admission.     Assessment / Plan     Assessment:   Active Hospital Problems    Diagnosis    • **Cardiac arrest (HCC)        Problem list not necessary in order priority  · Anion metabolic acidosis  · Lactic acidosis  · Suspected seizure  · Encephalopathy possibly from seizure versus other etiologies  · Status post cardiorespiratory resuscitation.  Reportedly PEA.  Initially placed on code chill protocol.  After reviewing the inclusion and exclusion criteria, patient believed to have an exclusion criteria for code chill (suspected seizures/status epilepticus, initiation of BLS greater than 10 minutes)  · Acute respiratory failure requiring mechanical ventilation  · Elevated BNP  · Shock (etiology unclear) -Levophed  · Hypokalemia replace potassium  · ? Hx of Hep c base on med from home (Mavyret)  · Elevated thyroid stimulating hormone - ? Hx of thyroid cancer with thyroidectomy - on supplemental HRT. Change to iv dose ~ half of normal PO dose  · ? Medical compliance  · Hyperglycemia - ? Hx of DM      plan:      Head CT and CT angiogram chest pending  Patient received a dose of Lasix  Noted order in previous order for fluid  We will check echocardiogram  Will request for EEG  Neurology consulted  We will hold off on Keppra as discussed with Dr. Pereira  Pulmonary consult for vent management  To prevent for vent synchronization, sedation and or management of suspected status epilepticus until proven otherwise  We will check magnesium, urine drug screen  Will need levophed for hypertension code  DC code chill based on exclusion criteria.  Hold metformin and antihtn meds  Npo  dvt and stress ulcer prophylaxis  Stat eeg  Critical care time greater than 30 minutes  Prognosis guarded  status/Advanced Care Plan: Full code  The patient's surrogate decision maker is to be determined  I discussed my findings and recommendations with the nurses, Dr. Pereira and Dr. Wakefield    Estimated length of stay is to be determined.     The patient was seen and examined by me on  Electronically signed by Usama Rodriguez MD, 11/09/22, 13:53 CST.                Electronically signed by Usama Rodriguez MD at 11/09/22 1426          Emergency Department Notes      Kaylah Wakefield MD at 11/09/22 1403      Procedure Orders    1. Central Line At Bedside [[id number]] ordered by Kaylah Wakefield MD    2. Insert Arterial Line [[id number]] ordered by Kaylah Wakefield MD    3. Intubation [[id number]] ordered by Kaylah Wakefield MD               Subjective   History of Present Illness  Patient is a 42-year-old female who presented to the ER in cardiac arrest.  Per EMS, they were called to the scene for an unresponsive patient.  When they arrived the fire department was already there and had an AED on the patient.  The AED shocked the patient 4-5 times.  Patient remained in PEA during transport to the ER.  She was given 1 round of epinephrine.  Patient does have a history of seizures and the family thought she may be having a seizure.    Family arrived at the scene.   states that the patient states she got choked on a pork chop last night and vomited a black mass.   states he was at the house today and the patient made a gurgling noise and became unresponsive.  Patient's  states he started CPR immediately.  He states he continued CPR until EMS arrived.  Patient has not been sick otherwise.  No further HPI could be obtained due to altered mental status.        Review of Systems   Unable to perform ROS: Intubated       Past Medical History:   Diagnosis Date   • Anxiety state    • Cancer (HCC)     THYROID   • Endogenous obesity    • History of malignant neoplasm of thyroid    • Postoperative  hypothyroidism    • Type 2 diabetes mellitus (HCC)    • Vitamin D deficiency        No Known Allergies    Past Surgical History:   Procedure Laterality Date   • CHOLECYSTECTOMY     • THYROIDECTOMY     • TONSILLECTOMY         Family History   Problem Relation Age of Onset   • Hypertension Other    • Thyroid disease Other    • Diabetes Other    • Heart disease Other    • Stroke Other    • Breast cancer Other    • Cholelithiasis Other    • Kidney disease Other    • Pancreatic cancer Other    • Prostate cancer Father    • Stroke Paternal Grandmother        Social History     Socioeconomic History   • Marital status:    Tobacco Use   • Smoking status: Every Day   • Smokeless tobacco: Never   • Tobacco comments:     smoking cessation encouraged    Substance and Sexual Activity   • Alcohol use: No   • Drug use: No           Objective   Physical Exam  Vitals and nursing note reviewed.   Constitutional:       Appearance: She is well-developed.      Interventions: She is intubated.      Comments: unresponsive   HENT:      Head: Normocephalic and atraumatic.      Mouth/Throat:      Comments: intubated  Eyes:      Conjunctiva/sclera: Conjunctivae normal.      Pupils: Pupils are equal, round, and reactive to light.   Cardiovascular:      Rate and Rhythm: Regular rhythm. Tachycardia present.      Heart sounds: Normal heart sounds.   Pulmonary:      Effort: She is intubated.      Breath sounds: Rhonchi present.   Abdominal:      Palpations: Abdomen is soft.      Tenderness: There is no abdominal tenderness.   Musculoskeletal:         General: No deformity. Normal range of motion.      Cervical back: Normal range of motion.   Skin:     General: Skin is warm.   Neurological:      Comments: Intubated, no purposeful movements   Psychiatric:         Behavior: Behavior normal.         Central Line At Bedside    Date/Time: 11/9/2022 2:17 PM  Performed by: Kaylah Wakefield MD  Authorized by: Kaylah Wakefield MD     Consent:      Consent obtained:  Emergent situation  Universal protocol:     Patient identity confirmed:  Anonymous protocol, patient vented/unresponsive  Pre-procedure details:     Indication(s): central venous access and hemodynamic monitoring      Skin preparation:  Chlorhexidine  Sedation:     Sedation type:  None  Anesthesia:     Anesthesia method:  None  Procedure details:     Location:  R internal jugular    Patient position:  Reverse Trendelenburg    Procedural supplies:  Triple lumen    Ultrasound guidance: yes      Sterile ultrasound techniques: Sterile gel and sterile probe covers were used      Number of attempts:  2    Successful placement: yes    Post-procedure details:     Post-procedure:  Dressing applied and line sutured    Assessment:  Blood return through all ports, free fluid flow, no pneumothorax on x-ray and placement verified by x-ray    Procedure completion:  Tolerated well, no immediate complications  Insert Arterial Line    Date/Time: 11/9/2022 2:18 PM  Performed by: Kaylah Wakefield MD  Authorized by: Kaylah Wakefield MD     Consent:     Consent obtained:  Emergent situation  Universal protocol:     Patient identity confirmed:  Anonymous protocol, patient vented/unresponsive  Indications:     Indications: hemodynamic monitoring and multiple ABGs    Pre-procedure details:     Skin preparation:  Chlorhexidine  Anesthesia:     Anesthesia method:  None  Procedure details:     Location:  L radial    Dagoberto's test performed: yes      Number of attempts:  2  Post-procedure details:     Post-procedure:  Sterile dressing applied and sutured    Procedure completion:  Tolerated well, no immediate complications  Intubation    Date/Time: 11/9/2022 2:19 PM  Performed by: Kaylah Wakefield MD  Authorized by: Kaylah Wakefield MD     Consent:     Consent obtained:  Emergent situation  Universal protocol:     Patient identity confirmed:  Anonymous protocol, patient vented/unresponsive  Pre-procedure details:      Indication: failure to oxygenate, failure to protect airway and failure to ventilate      Patient status:  Unresponsive    Induction agents:  None    Paralytics:  None  Procedure details:     Preoxygenation:  Bag valve mask    CPR in progress: no      Intubation method:  Oral    Intubation technique: video assisted      Laryngoscope blade:  Mac 3    Bougie used: no      Tube size (mm):  7.5    Tube type:  Cuffed    Number of attempts:  1    Ventilation between attempts: no      Tube visualized through cords: yes    Placement assessment:     ETT at teeth/gumline (cm):  23    Tube secured with:  ETT lr    Breath sounds:  Equal    Placement verification: chest rise, CXR verification, direct visualization, equal breath sounds, tube exhalation and waveform ETCO2      CXR findings:  Appropriate position  Post-procedure details:     Procedure completion:  Tolerated well, no immediate complications              ED Course      Patient arrived in cardiac arrest.  Patient was in PEA.  Patient was given a round of epinephrine.  ROSC was obtained.  Please see nurses note for complete ACLS protocol.    I then proceeded to intubate the patient and I placed a central line and arterial line.  A code chill was called.  Please see procedure notes.     Lab Results (last 24 hours)     Procedure Component Value Units Date/Time    CBC & Differential [[id number]]  (Abnormal) Collected: 11/09/22 1213    Specimen: Blood Updated: 11/09/22 1330    Narrative:      The following orders were created for panel order CBC & Differential.  Procedure                               Abnormality         Status                     ---------                               -----------         ------                     CBC Auto Differential[[id number]]        Abnormal            Final result               Slide Review, Hematology[[id number]]                         In process                   Please view results for these tests on the individual orders.     Comprehensive Metabolic Panel [[id number]]  (Abnormal) Collected: 11/09/22 1213    Specimen: Blood Updated: 11/09/22 1325     Glucose 256 mg/dL      BUN 8 mg/dL      Creatinine 0.88 mg/dL      Sodium 136 mmol/L      Potassium 3.1 mmol/L      Comment: Slight hemolysis detected by analyzer. Results may be affected.        Chloride 102 mmol/L      CO2 16.0 mmol/L      Calcium 8.3 mg/dL      Total Protein 6.1 g/dL      Albumin 2.90 g/dL      ALT (SGPT) 7 U/L      AST (SGOT) 17 U/L      Alkaline Phosphatase 79 U/L      Total Bilirubin 0.3 mg/dL      Globulin 3.2 gm/dL      A/G Ratio 0.9 g/dL      BUN/Creatinine Ratio 9.1     Anion Gap 18.0 mmol/L      eGFR 83.2 mL/min/1.73      Comment: National Kidney Foundation and American Society of Nephrology (ASN) Task Force recommended calculation based on the Chronic Kidney Disease Epidemiology Collaboration (CKD-EPI) equation refit without adjustment for race.       Narrative:      GFR Normal >60  Chronic Kidney Disease <60  Kidney Failure <15      Protime-INR [[id number]]  (Abnormal) Collected: 11/09/22 1213    Specimen: Blood Updated: 11/09/22 1314     Protime 15.6 Seconds      INR 1.29    aPTT [[id number]]  (Abnormal) Collected: 11/09/22 1213    Specimen: Blood Updated: 11/09/22 1314     PTT 39.6 seconds     Lipase [[id number]]  (Normal) Collected: 11/09/22 1213    Specimen: Blood Updated: 11/09/22 1320     Lipase 28 U/L     Troponin [[id number]]  (Normal) Collected: 11/09/22 1213    Specimen: Blood Updated: 11/09/22 1320     Troponin T 0.021 ng/mL     Narrative:      Troponin T Reference Range:  <= 0.03 ng/mL-   Negative for AMI  >0.03 ng/mL-     Abnormal for myocardial necrosis.  Clinicians would have to utilize clinical acumen, EKG, Troponin and serial changes to determine if it is an Acute Myocardial Infarction or myocardial injury due to an underlying chronic condition.       Results may be falsely decreased if patient taking Biotin.      Blood Culture - Blood, Arm, Right  "[[id number]] Collected: 11/09/22 1213    Specimen: Blood from Arm, Right Updated: 11/09/22 1317    Lactic Acid, Plasma [[id number]]  (Abnormal) Collected: 11/09/22 1213    Specimen: Blood Updated: 11/09/22 1329     Lactate 7.1 mmol/L     Procalcitonin [[id number]]  (Normal) Collected: 11/09/22 1213    Specimen: Blood Updated: 11/09/22 1330     Procalcitonin 0.05 ng/mL     Narrative:      As a Marker for Sepsis (Non-Neonates):    1. <0.5 ng/mL represents a low risk of severe sepsis and/or septic shock.  2. >2 ng/mL represents a high risk of severe sepsis and/or septic shock.    As a Marker for Lower Respiratory Tract Infections that require antibiotic therapy:    PCT on Admission    Antibiotic Therapy       6-12 Hrs later    >0.5                Strongly Recommended  >0.25 - <0.5        Recommended   0.1 - 0.25          Discouraged              Remeasure/reassess PCT  <0.1                Strongly Discouraged     Remeasure/reassess PCT    As 28 day mortality risk marker: \"Change in Procalcitonin Result\" (>80% or <=80%) if Day 0 (or Day 1) and Day 4 values are available. Refer to [email or web address]://[email or web address] [email or web address]    Change in PCT <=80%  A decrease of PCT levels below or equal to 80% defines a positive change in PCT test result representing a higher risk for 28-day all-cause mortality of patients diagnosed with severe sepsis for septic shock.    Change in PCT >80%  A decrease of PCT levels of more than 80% defines a negative change in PCT result representing a lower risk for 28-day all-cause mortality of patients diagnosed with severe sepsis or septic shock.       Ethanol [[id number]] Collected: 11/09/22 1213    Specimen: Blood Updated: 11/09/22 1320     Ethanol % <0.010 %     Narrative:      Not for legal purposes. Chain of Custody not followed.     TSH [[id number]]  (Abnormal) Collected: 11/09/22 1213    Specimen: Blood Updated: 11/09/22 1331     TSH 41.480 uIU/mL     CBC Auto Differential [[id number]]  (Abnormal) " Collected: 11/09/22 1213    Specimen: Blood Updated: 11/09/22 1330     WBC 16.40 10*3/mm3      RBC 4.00 10*6/mm3      Hemoglobin 9.8 g/dL      Hematocrit 34.1 %      MCV 85.3 fL      MCH 24.5 pg      MCHC 28.7 g/dL      RDW 15.8 %      RDW-SD 49.3 fl      MPV 10.7 fL      Platelets 247 10*3/mm3     Manual Differential [[id number]]  (Abnormal) Collected: 11/09/22 1213    Specimen: Blood Updated: 11/09/22 1329     Neutrophil % 35.2 %      Lymphocyte % 43.8 %      Monocyte % 3.1 %      Eosinophil % 1.6 %      Bands %  7.8 %      Metamyelocyte % 3.9 %      Myelocyte % 1.6 %      Atypical Lymphocyte % 0.8 %      Neutrophils Absolute 7.05 10*3/mm3      Lymphocytes Absolute 7.31 10*3/mm3      Monocytes Absolute 0.51 10*3/mm3      Eosinophils Absolute 0.26 10*3/mm3      nRBC 2.3 /100 WBC      Anisocytosis Mod/2+     Crenated RBC's Large/3+     Microcytes Slight/1+     Ovalocytes Slight/1+     Poikilocytes Large/3+     Polychromasia Mod/2+     WBC Morphology Normal     Platelet Estimate Adequate    BNP [[id number]]  (Abnormal) Collected: 11/09/22 1213    Specimen: Blood Updated: 11/09/22 1342     proBNP 14,804.0 pg/mL     Narrative:      Among patients with dyspnea, NT-proBNP is highly sensitive for the detection of acute congestive heart failure. In addition NT-proBNP of <300 pg/ml effectively rules out acute congestive heart failure with 99% negative predictive value.      Slide Review, Hematology [[id number]] Collected: 11/09/22 1213    Specimen: Blood Updated: 11/09/22 1328    Magnesium [[id number]] Collected: 11/09/22 1213    Specimen: Blood Updated: 11/09/22 1415    Blood Gas, Arterial - [[id number]]  (Abnormal) Collected: 11/09/22 1306    Specimen: Arterial Blood Updated: 11/09/22 1304     Site Arterial Line     Dagoberto's Test N/A     pH, Arterial 7.162 pH units      Comment: 85 Value below critical limit        pCO2, Arterial 43.8 mm Hg      pO2, Arterial 66.0 mm Hg      Comment: 84 Value below reference range        HCO3,  Arterial 15.7 mmol/L      Comment: 84 Value below reference range        Base Excess, Arterial -12.4 mmol/L      Comment: 84 Value below reference range        O2 Saturation, Arterial 86.6 %      Comment: 84 Value below reference range        Temperature 37.0 C      Barometric Pressure for Blood Gas 756 mmHg      Modality Ventilator     FIO2 65 %      Ventilator Mode AC     Set Tidal Volume 500     Set Mech Resp Rate 20.0     PEEP 5.0     Notified Who DR NEWSOME     Notified By [id number]     Notified Time 11/09/2022 13:08     Collected by [id number]     Comment: Meter: [id number]     :  [id number]        pCO2, Temperature Corrected 43.8 mm Hg      pH, Temp Corrected 7.162 pH Units      pO2, Temperature Corrected 66.0 mm Hg     Urinalysis With Culture If Indicated - Urine, Catheter [[id number]] Collected: 11/09/22 1342    Specimen: Urine, Catheter Updated: 11/09/22 1356    Urinalysis, Microscopic Only - Urine, Catheter [[id number]] Collected: 11/09/22 1342    Specimen: Urine, Catheter Updated: 11/09/22 1405    POC Urine Pregnancy [[id number]]  (Normal) Resulted: 11/09/22 1345    Specimen: Urine Updated: 11/09/22 1347     HCG, Urine, QL Negative     Lot Number [id number]     Internal Positive Control Positive     Internal Negative Control Negative     Expiration Date 02/29/2024        CT Head Without Contrast   Final Result   Impression:     1. No acute intracranial process.   This report was finalized on 11/09/2022 14:02 by Dr Antonio Ahmadi, .      CT Angiogram Chest   Final Result   1. No evidence of pulmonary embolus.   2. Enlarged left ventricle. Pulmonary edema with bilateral small   layering pleural effusions.   3. Endotracheal tube is in good position.           This report was finalized on 11/09/2022 14:11 by Dr Antonio Ahmadi, .      XR Chest 1 View   Final Result   1. Endotracheal tube is in good position. Lungs are well expanded.   2. Bilateral central predominant hazy pulmonary infiltrates concerning   for  pulmonary edema.   3. Central venous catheter is in good position.           This report was finalized on 11/09/2022 13:03 by Dr Antonio Ahmadi, .          There was concern for seizure-like activity.  Patient was placed on a propofol drip and was given a milligram of Ativan.  Blood pressure slightly low.  Patient was started on Levophed.  Dr. Ernie Back with neurology was consulted and a stat EEG was ordered.  Labs showed metabolic acidosis with an elevated lactate.  Also showed leukocytosis, elevated TSH and hyperglycemia.  Patient also had mild hypokalemia.  Chest x-ray showed ET tube to be in good position.  Patient had findings concerning for pulmonary edema.  She was given a dose of Lasix.  Central venous catheter was in good position.  CT scan of the head showed no acute findings.  CTA of the chest showed no evidence of pulmonary embolus.  Patient did have an enlarged left ventricle with pulmonary edema and bilateral small layering pleural effusions.  Patient was admitted to the ICU by Dr. Santiago with the hosptialist service for further treatment.                                MDM    Final diagnoses:   Cardiac arrest (HCC)   Acute pulmonary edema (HCC)       ED Disposition  ED Disposition     ED Disposition   Decision to Admit    Condition   --    Comment   Level of Care: Critical Care [6]   Diagnosis: Cardiac arrest (HCC) [427.5.ICD-9-CM]   Admitting Physician: NATASHA SANTIAGO [[id number]]   Attending Physician: NATASHA SANTIAGO [[id number]]   Certification: I Certify That Inpatient Hospital Services Are Medically Necessary For Greater Than 2 Midnights               No follow-up provider specified.       Medication List      No changes were made to your prescriptions during this visit.          Kaylah Wakefield MD  11/09/22 1420      Electronically signed by Kaylah Wakefield MD at 11/09/22 1420         Facility-Administered Medications as of 11/10/2022   Medication Dose Route Frequency  Provider Last Rate Last Admin   • acetaminophen (TYLENOL) suppository 650 mg  650 mg Rectal Q4H PRN Usama Rodriguez MD       • artificial tears ophthalmic ointment   Both Eyes Q4H Usama Rodriguez MD   Given at 11/10/22 0420   • budesonide (PULMICORT) nebulizer solution 0.5 mg  0.5 mg Nebulization BID - RT Carlin Moseley MD   0.5 mg at 11/09/22 1913   • cefTRIAXone (ROCEPHIN) 1 g in sodium chloride 0.9 % 100 mL IVPB-VTB  1 g Intravenous Once Usama Rodriguez MD       • [COMPLETED] Chlorhexidine Gluconate Cloth 2 % pads 1 application  1 application Topical Once Usama Rodriguez MD   1 application at 11/09/22 1655   • Chlorhexidine Gluconate Cloth 2 % pads 1 application  1 application Topical Q24H Usama Rodriguez MD   1 application at 11/10/22 0432   • Enoxaparin Sodium (LOVENOX) syringe 40 mg  40 mg Subcutaneous Daily Shani Mendiola APRN   40 mg at 11/09/22 1645   • [COMPLETED] EPINEPHrine (ADRENALIN) injection    Code / Trauma / Sedation Medication Kaylah Wakefield MD   1 mg at 11/09/22 1203   • famotidine (PEPCID) injection 20 mg  20 mg Intravenous Q12H Usama Rodriguez MD       • [COMPLETED] fentaNYL citrate (PF) (SUBLIMAZE) injection 25 mcg  25 mcg Intravenous Once Usama Rodriguez MD   25 mcg at 11/09/22 1552   • [COMPLETED] furosemide (LASIX) injection 60 mg  60 mg Intravenous Once Usama Rodriguez MD   60 mg at 11/09/22 1645   • influenza vac split quad (FLUZONE,FLUARIX,AFLURIA,FLULAVAL) injection 0.5 mL  0.5 mL Intramuscular During Hospitalization Usama Rodriguez MD       • [COMPLETED] iopamidol (ISOVUE-370) 76 % injection 100 mL  100 mL Intravenous Once in imaging Kaylah Wakefield MD   100 mL at 11/09/22 1355   • ipratropium-albuterol (DUO-NEB) nebulizer solution 3 mL  3 mL Nebulization 4x Daily - RT Shani Mendiola, APRN   3 mL at 11/09/22 1926   • [COMPLETED] levETIRAcetam in NaCl 0.75% (KEPPRA) IVPB 1,000 mg   1,000 mg Intravenous Once Usama Rodriguez MD   1,000 mg at 11/09/22 1624   • levETIRAcetam in NaCl 0.75% (KEPPRA) IVPB 1,000 mg  1,000 mg Intravenous Q12H Roise Nunes MD   1,000 mg at 11/09/22 2147   • levothyroxine sodium injection 88 mcg  88 mcg Intravenous Daily Usama Rodriguez MD       • [COMPLETED] LORazepam (ATIVAN) injection 1 mg  1 mg Intravenous Once Kaylah Wakefield MD   1 mg at 11/09/22 1306   • LORazepam (ATIVAN) injection 1 mg  1 mg Intravenous Q4H PRN Usama Rodriguez MD       • methylPREDNISolone sodium succinate (SOLU-Medrol) injection 40 mg  40 mg Intravenous Q8H Shani Mendiola APRN   40 mg at 11/10/22 0009   • mupirocin (BACTROBAN) 2 % nasal ointment 1 application  1 application Each Nare BID Usama Rodriguez MD   1 application at 11/09/22 2149   • norepinephrine (LEVOPHED) 8 mg in 250 mL NS infusion (premix)  0.02-0.3 mcg/kg/min Intravenous Titrated Usama Rodriguez MD   Stopped at 11/10/22 0100   • ondansetron (ZOFRAN) injection 4 mg  4 mg Intravenous Q6H PRN Usama Rodriguez MD       • [COMPLETED] perflutren (DEFINITY) lipid microspheres injection 13.04 mg  2 mL Intravenous Once Usama Rodriguez MD   2 mL at 11/09/22 1652   • Pharmacy to Dose enoxaparin (LOVENOX)   Does not apply Continuous PRN Usama Rodriguez MD       • Pharmacy to Dose Zosyn   Does not apply Continuous PRN Carlin Moseley MD       • [COMPLETED] piperacillin-tazobactam (ZOSYN) 4.5 g/100 mL 0.9% NS IVPB (mbp)  4.5 g Intravenous Once Carlin Moseley MD   4.5 g at 11/09/22 2142   • piperacillin-tazobactam (ZOSYN) 4.5 g/100 mL 0.9% NS IVPB (mbp)  4.5 g Intravenous Q8H Carlin Moseley MD   4.5 g at 11/10/22 0420   • [COMPLETED] potassium chloride 10 mEq in 100 mL IVPB  10 mEq Intravenous Q1H Usama Rodriguez  mL/hr at 11/09/22 1656 10 mEq at 11/09/22 1656   • propofol (DIPRIVAN) infusion 10 mg/mL 100 mL  5-50 mcg/kg/min  Intravenous Titrated Usama Rodriguez MD 29.5 mL/hr at 11/10/22 0426 50 mcg/kg/min at 11/10/22 0426   • [COMPLETED] sodium chloride 0.9 % bolus 1,000 mL  1,000 mL Intravenous Once Kaylah Wakefield MD 2,000 mL/hr at 11/09/22 1210 1,000 mL at 11/09/22 1210   • [COMPLETED] sodium chloride 0.9 % bolus 500 mL  500 mL Intravenous Once Kaylah Wakefield MD 1,000 mL/hr at 11/09/22 1337 500 mL at 11/09/22 1337   • sodium chloride 0.9 % flush 10 mL  10 mL Intravenous PRN Usama Rodriguez MD       • sodium chloride 0.9 % flush 10 mL  10 mL Intravenous PRN Usama Rodriguez MD       • sodium chloride 0.9 % flush 10 mL  10 mL Intravenous Q12H Usama Rodriguez MD       • sodium chloride 0.9 % flush 10 mL  10 mL Intravenous PRN Usama Rodriguez MD       • [COMPLETED] thiamine (B-1) 200 mg in sodium chloride 0.9 % 100 mL IVPB  200 mg Intravenous Once Rosie Nunes  mL/hr at 11/09/22 1711 200 mg at 11/09/22 1711   • [COMPLETED] vecuronium (NORCURON) injection 9.8 mg  100 mcg/kg Intravenous Once Usama Rodriguez MD   9.8 mg at 11/09/22 1549       Orders (last 48 hrs)      Start     Ordered    11/10/22 1100  levothyroxine sodium injection 88 mcg  Daily at 1100         11/10/22 0436    11/10/22 0900  sodium chloride 0.9 % flush 10 mL  Every 12 Hours Scheduled         11/10/22 0436    11/10/22 0900  famotidine (PEPCID) injection 20 mg  Every 12 Hours Scheduled         11/10/22 0436    11/10/22 0800  Vital Signs  Every 4 Hours       11/10/22 0436    11/10/22 0800  Oral Care  2 Times Daily       11/10/22 0436    11/10/22 0800  Neuro Checks  Every 4 Hours       11/10/22 0436    11/10/22 0800  Weaning Parameters  Every Morning       11/10/22 0305    11/10/22 0800  Weaning Trial per protocol  Every Morning       11/10/22 0305    11/10/22 0800  Weaning Trial per protocol  Every Morning       11/10/22 0307    11/10/22 0800  Weaning Parameters  Every Morning       11/10/22  0307    11/10/22 0600  Troponin  Every 6 Hours       11/10/22 0436    11/10/22 0600  CBC & Differential  Daily       11/09/22 1414    11/10/22 0600  Comprehensive Metabolic Panel  Morning Draw         11/09/22 1414    11/10/22 0600  Blood Gas, Arterial -  Daily       11/09/22 1501    11/10/22 0600  XR Chest 1 View  Daily       11/09/22 1501    11/10/22 0600  CBC Auto Differential  PROCEDURE ONCE         11/09/22 2202    11/10/22 0530  cefTRIAXone (ROCEPHIN) 1 g in sodium chloride 0.9 % 100 mL IVPB-VTB  Once         11/10/22 0436    11/10/22 0437  ECG 12 Lead Other; status post cardiorespiratory resuscitation  STAT         11/10/22 0436    11/10/22 0437  CK  STAT         11/10/22 0436    11/10/22 0437  Weigh Patient  Once         11/10/22 0436    11/10/22 0437  Oxygen Therapy- Nasal Cannula; Titrate for SPO2: 90% - 95%  Continuous         11/10/22 0436    11/10/22 0437  Insert Peripheral IV  Once         11/10/22 0436    11/10/22 0437  Saline Lock & Maintain IV Access  Continuous         11/10/22 0436    11/10/22 0437  Activity - Strict Bed Rest  Until Discontinued         11/10/22 0436    11/10/22 0437  Insert Indwelling Urinary Catheter  Once        Comments: Follow Protocol As Outlined in Process Instructions (Open Order Report to View Full Instructions)   See Hyperspace for full Linked Orders Report.    11/10/22 0436    11/10/22 0437  Assess Need for Indwelling Urinary Catheter - Follow Removal Protocol  Continuous        Comments: Follow Protocol As Outlined in Process Instructions (Open Order Report to View Full Instructions)   See Hyperspace for full Linked Orders Report.    11/10/22 0436    11/10/22 0437  NPO Diet NPO Type: Strict NPO  Diet Effective Now         11/10/22 0436    11/10/22 0436  Intake & Output  Every Shift       11/10/22 0436    11/10/22 0436  sodium chloride 0.9 % flush 10 mL  As Needed         11/10/22 0436    11/10/22 0436  Pharmacy to Dose enoxaparin (LOVENOX)  Continuous PRN          11/10/22 0436    11/10/22 0436  Urinary Catheter Care  Every Shift      See Hyperspace for full Linked Orders Report.    11/10/22 0436    11/10/22 0436  ondansetron (ZOFRAN) injection 4 mg  Every 6 Hours PRN         11/10/22 0436    11/10/22 0436  acetaminophen (TYLENOL) suppository 650 mg  Every 4 Hours PRN         11/10/22 0436    11/10/22 0436  LORazepam (ATIVAN) injection 1 mg  Every 4 Hours PRN         11/10/22 0436    11/10/22 0400  Chlorhexidine Gluconate Cloth 2 % pads 1 application  Every 24 Hours         11/09/22 1504    11/10/22 0400  piperacillin-tazobactam (ZOSYN) 4.5 g/100 mL 0.9% NS IVPB (mbp)  Every 8 Hours         11/09/22 1830    11/10/22 0307  Intubation  Once         11/10/22 0307    11/10/22 0306  Ventilator - AC/VC; 20; 40%; 5; mL/kg IBW; Other; 500  Continuous         11/10/22 0306    11/10/22 0304  Ventilator - AC/VC; 20; 40%; 5; mL/kg IBW; Other; 500  Continuous,   Status:  Canceled         11/10/22 0305    11/09/22 2326  POC Glucose Once  PROCEDURE ONCE         11/09/22 2314    11/09/22 2302  Potassium  Once         11/09/22 2301    11/09/22 2114  Blood Culture - Blood, Hand, Right  STAT         11/09/22 2114 11/09/22 2100  potassium chloride (KLOR-CON) packet 40 mEq  2 Times Daily,   Status:  Discontinued         11/09/22 1501    11/09/22 2100  levETIRAcetam in NaCl 0.75% (KEPPRA) IVPB 1,000 mg  Every 12 Hours Scheduled         11/09/22 1603    11/09/22 2050  influenza vac split quad (FLUZONE,FLUARIX,AFLURIA,FLULAVAL) injection 0.5 mL  During Hospitalization         11/09/22 2050 11/09/22 2016  XR Abdomen KUB  1 Time Imaging         11/09/22 2016 11/09/22 2000  piperacillin-tazobactam (ZOSYN) 4.5 g/100 mL 0.9% NS IVPB (mbp)  Once         11/09/22 1830 11/09/22 1915  budesonide (PULMICORT) nebulizer solution 0.5 mg  2 Times Daily - RT         11/09/22 1823 11/09/22 1823  Pharmacy to Dose Zosyn  Continuous PRN         11/09/22 1823 11/09/22 1751  STAT Lactic Acid, Reflex   PROCEDURE ONCE         11/09/22 1523    11/09/22 1745  perflutren (DEFINITY) lipid microspheres injection 13.04 mg  Once         11/09/22 1651    11/09/22 1645  furosemide (LASIX) injection 60 mg  Once         11/09/22 1553    11/09/22 1630  ipratropium-albuterol (DUO-NEB) nebulizer solution 3 mL  4 Times Daily - RT         11/09/22 1501    11/09/22 1630  fentaNYL citrate (PF) (SUBLIMAZE) injection 25 mcg  Once         11/09/22 1539    11/09/22 1615  artificial tears ophthalmic ointment  Every 4 Hours         11/09/22 1527    11/09/22 1615  vecuronium (NORCURON) injection 9.8 mg  Once         11/09/22 1527    11/09/22 1604  EEG Continuous Monitoring With Video  STAT         11/09/22 1603    11/09/22 1600  furosemide (LASIX) injection 20 mg  Every 12 Hours,   Status:  Discontinued         11/09/22 1501    11/09/22 1600  methylPREDNISolone sodium succinate (SOLU-Medrol) injection 40 mg  Every 8 Hours         11/09/22 1501    11/09/22 1600  Enoxaparin Sodium (LOVENOX) syringe 40 mg  Daily        Note to Pharmacy: Wait on MRI results    11/09/22 1501    11/09/22 1600  mupirocin (BACTROBAN) 2 % nasal ointment 1 application  2 Times Daily         11/09/22 1504    11/09/22 1600  Chlorhexidine Gluconate Cloth 2 % pads 1 application  Once         11/09/22 1504    11/09/22 1600  Oral Care & Skin Care  Every 2 Hours       11/09/22 1527    11/09/22 1530  thiamine (B-1) 200 mg in sodium chloride 0.9 % 100 mL IVPB  Once         11/09/22 1439    11/09/22 1526  Patients must be intubated with adequate sedation and analgesia prior to starting neuromuscular blocking agent  Once         11/09/22 1527    11/09/22 1526  Titrate Neuromuscular Blockade Using Peripheral Nerve Stimulator  Until Discontinued         11/09/22 1527    11/09/22 1526  Obtain Baseline Train of Four Prior to Administration of Neuromuscular Blockade Bolus  Once         11/09/22 1527    11/09/22 1526  After Bolus Dose, Assess Train of Four Every 15 Minutes for  "Minimal of 2 Twitches Prior to Initiating Infusion  Every 15 Minutes       22 1527    22 1526  After Initiating Infusion, Assess Train of Four Every 1 Hour Until Stable (Desired Level is 2-3 Twitches Unless Otherwise Ordered by MD  Per Order Details        Comments: \" Train of four monitorin-1 twitches = Hold infusion and recheck TOF every 30-60 minutes until response occurs. Resume infusion at half the previous rate once TOF is 2-3 twitches.  2-3 twitches and no spontaneous breathing or ventilator dyssynchrony = maintain infusion and assess every 4 hours  4 twitches and spontaneous breathing or ventilator dyssynchrony = increase rate per medication orders\"    22 1527    22 1526  BIS Monitoring  Per Order Details         22 1527    22 1526  Respiratory Therapist & Primary Nurse to Monitor Patient's Compliance WIth Ventilatory Goals  Continuous         22 1527    22 1526  Notify Provider if Unable to Maintain Optimal Ventilatory Parameters  Until Discontinued         22 1527    22 1526  Place Pressure Relief Mattress on Bed  Continuous         22 1527    22 1526  Elevate HOB 30 Degrees  Continuous         22 1527    22 1526  Place High Top Tennis Shoes or Lenaird Splints for Prevention of Foot Drop.  Consider Handrolls / Splints for Hands As Needed  Continuous         22 1527    22 1526  Passive Range of Motion With Repositioning  Now Then Every 4 Hours         22 1527    22 1526  Tape Eyelids Shut if Greater Than 50% of Eye Surface is Exposed  Continuous         22 1527    22 1513  STAT Lactic Acid, Reflex  PROCEDURE ONCE         22 1324    22 1508  T3, Free  Once         22 1444    22 1459  Occult Blood X 1, Stool - Stool, Per Rectum  Once         22 1458    22 1459  Respiratory Culture - Sputum, Bronchus  Once         22 1501    22 1450  Blood " Gas, Arterial -  PROCEDURE ONCE         11/09/22 1455    11/09/22 1445  T4, Free  Once         11/09/22 1444    11/09/22 1436  Hemoglobin A1c  Once         11/09/22 1435    11/09/22 1436  Lipid Panel  Once         11/09/22 1435    11/09/22 1434  MRI Brain With & Without Contrast  1 Time Imaging         11/09/22 1433    11/09/22 1433  Folate  Once         11/09/22 1432    11/09/22 1432  Iron Profile  Once         11/09/22 1432    11/09/22 1432  Ferritin  Once         11/09/22 1432    11/09/22 1432  Vitamin B12  Once         11/09/22 1432    11/09/22 1430  potassium chloride 10 mEq in 100 mL IVPB  Every 1 Hour         11/09/22 1414    11/09/22 1427  Urine Drug Screen - Urine, Clean Catch  Once         11/09/22 1304    11/09/22 1421  Critical Care  Once,   Status:  Canceled        Comments: This order was created via procedure documentation    11/09/22 1420    11/09/22 1421  Code Status and Medical Interventions:  Continuous         11/09/22 1424    11/09/22 1420  Intubation  Once        Comments: This order was created via procedure documentation    11/09/22 1419    11/09/22 1419  Insert Arterial Line  Once        Comments: This order was created via procedure documentation    11/09/22 1418    11/09/22 1418  Central Line At Bedside  Once        Comments: This order was created via procedure documentation    11/09/22 1417    11/09/22 1414  Blood Gas, Arterial -  STAT         11/09/22 1414    11/09/22 1413  Adult Transthoracic Echo Complete W/ Cont if Necessary Per Protocol  Once         11/09/22 1412    11/09/22 1413  Magnesium  STAT         11/09/22 1414    11/09/22 1406  Urinalysis, Microscopic Only - Urine, Catheter  Once         11/09/22 1405    11/09/22 1355  Inpatient Pulmonology Consult  Once        Specialty:  Pulmonary Disease  Provider:  (Not yet assigned)    11/09/22 1354    11/09/22 1345  norepinephrine (LEVOPHED) 8 mg in 250 mL NS infusion (premix)  Titrated         11/09/22 1343    11/09/22 1344   Inpatient Neurology Consult General  Once        Specialty:  Neurology  Provider:  (Not yet assigned)    11/09/22 1343    11/09/22 1344  Cardiac Monitoring  Continuous        Comments: Follow Standing Orders As Outlined in Process Instructions (Open Order Report to View Full Instructions)    11/09/22 1343    11/09/22 1343  Inpatient Admission  Once         11/09/22 1343    11/09/22 1340  levETIRAcetam in NaCl 0.75% (KEPPRA) IVPB 1,000 mg  Once         11/09/22 1338    11/09/22 1337  EEG  STAT         11/09/22 1338    11/09/22 1330  sodium chloride 0.9 % bolus 1,000 mL  Once         11/09/22 1335    11/09/22 1329  Slide Review, Hematology  Once         11/09/22 1328    11/09/22 1326  furosemide (LASIX) injection 40 mg  Once,   Status:  Discontinued         11/09/22 1324    11/09/22 1325  EEG  STAT         11/09/22 1324    11/09/22 1325  BNP  Once         11/09/22 1324    11/09/22 1321  iopamidol (ISOVUE-370) 76 % injection 100 mL  Once in Imaging         11/09/22 1319    11/09/22 1308  Manual Differential  Once         11/09/22 1307    11/09/22 1306  sodium chloride 0.9 % bolus 500 mL  Once         11/09/22 1304    11/09/22 1306  propofol (DIPRIVAN) infusion 10 mg/mL 100 mL  Titrated         11/09/22 1304    11/09/22 1306  LORazepam (ATIVAN) injection 1 mg  Once         11/09/22 1304    11/09/22 1306  CBC Auto Differential  Once         11/09/22 1305    11/09/22 1305  Blood Gas, Arterial -  PROCEDURE ONCE         11/09/22 1306    11/09/22 1304  CT Head Without Contrast  1 Time Imaging         11/09/22 1304    11/09/22 1304  CT Angiogram Chest  1 Time Imaging         11/09/22 1304    11/09/22 1304  Insert peripheral IV  Once        See Hyperspace for full Linked Orders Report.    11/09/22 1304    11/09/22 1304  POC Urine Pregnancy  STAT         11/09/22 1304    11/09/22 1303  sodium chloride 0.9 % flush 10 mL  As Needed        See Hyperspace for full Linked Orders Report.    11/09/22 1304    11/09/22 1303  CBC &  Differential  Once         11/09/22 1304    11/09/22 1303  Comprehensive Metabolic Panel  Once         11/09/22 1304    11/09/22 1303  Protime-INR  Once         11/09/22 1304    11/09/22 1303  aPTT  Once         11/09/22 1304    11/09/22 1303  Lipase  Once         11/09/22 1304    11/09/22 1303  Urinalysis With Culture If Indicated - Urine, Catheter  Once         11/09/22 1304    11/09/22 1303  Troponin  Once         11/09/22 1304    11/09/22 1303  Blood Culture - Blood, Arm, Right  Once         11/09/22 1304    11/09/22 1303  Blood Culture - Blood,  Once         11/09/22 1304    11/09/22 1303  Lactic Acid, Plasma  Once         11/09/22 1304    11/09/22 1303  Procalcitonin  Once         11/09/22 1304    11/09/22 1303  Ethanol  Once         11/09/22 1304    11/09/22 1303  TSH  Once         11/09/22 1304    11/09/22 1303  XR Chest 1 View  1 Time Imaging,   Status:  Canceled         11/09/22 1304    11/09/22 1303  ECG 12 Lead Syncope  Once         11/09/22 1304    11/09/22 1238  XR Chest 1 View  1 Time Imaging         11/09/22 1238    11/09/22 1216  EPINEPHrine (ADRENALIN) injection  Code / Trauma / Sedation Medication         11/09/22 1217    11/09/22 1211  Insert peripheral IV  Once         11/09/22 1211    11/09/22 1211  Wylie Draw  Once         11/09/22 1211    11/09/22 1211  Green Top (Gel)  PROCEDURE ONCE         11/09/22 1211    11/09/22 1211  Lavender Top  PROCEDURE ONCE         11/09/22 1211    11/09/22 1211  Gray Top  PROCEDURE ONCE         11/09/22 1211    11/09/22 1211  Light Blue Top  PROCEDURE ONCE         11/09/22 1211    11/09/22 1210  sodium chloride 0.9 % flush 10 mL  As Needed         11/09/22 1211                 Physician Progress Notes (last 72 hours)      Usama Rodriguez MD at 11/09/22 1546          Dr. Pereira requested that patient be paralyzed.  My experience and paralytics are mostly on rapid sequence intubation and patient is on code chill protocol.  She needed patient to be  paralyzed in order to visualize better any seizure activities.  She said that there is lots of muscle artifacts from what she is seeing right now.  Patient is currently intubated.  I requested the pharmacist (Zahraa) and discuss the goal of care.  I looked at rocuronium and vecuronium as possible option.  Will just give her a one-time dose.  Patient is off propofol while on EEG.  Therefore, we decided also to give her fentanyl so she will just be paralyzed and aware that she could not move.    Airway air is currently secured.    Patient hemodynamically stable  Urine drug screen negative  Blood gas showing improvement in her pH  Lactic acid down at 2.5  Magnesium 2.5  Case discussed with nurse practitioner Troy with pulmonary as well.  Case discussed with nurse Indira.  Patient has been identified correctly and ordered placed correctly as verified by pharmacist.    Additional critical time spent at least 15 minutes.    Noted based on CT angiogram the following  Imaging Results (Last 24 Hours)     Procedure Component Value Units Date/Time    CT Angiogram Chest [[id number]] Collected: 11/09/22 1406     Updated: 11/09/22 1414    Narrative:      CT ANGIOGRAM CHEST- 11/9/2022 1:47 AM CST      HISTORY: Pulmonary embolism (PE) suspected, unknown D-dimer      COMPARISON: Chest x-ray dated 11/9/2022.      DOSE LENGTH PRODUCT: 247 mGy cm. Automated exposure control was also  utilized to decrease patient radiation dose.     TECHNIQUE: Helical tomographic images of the chest were obtained after  the administration of intravenous contrast following angiogram protocol.  Additionally, 3D and multiplanar reformatted images were provided.        FINDINGS:    Pulmonary arteries: There is adequate enhancement of the pulmonary  arteries to evaluate for central and segmental pulmonary emboli. There  are no filling defects within the main, lobar, segmental or visualized  subsegmental pulmonary arteries. The pulmonary arteries are  within  normal limits for size.      Aorta and great vessels: Thoracic aorta is normal in caliber. No  dissection identified. No flow-limiting stenosis identified at the great  vessel origins.     Visualized neck base: The imaged portion of the base of the neck appears  unremarkable.      Lungs: Bilateral septal thickening and mixed groundglass and  consolidative dependent airspace opacities reflecting pulmonary edema.  There are bilateral small layering pleural effusions. Endotracheal tube  is present with tip approximately 4.2 cm above the lori. No  pneumothorax. Minimal secretions in the trachea. Airways are otherwise  clear.     Heart: Enlarged left ventricle. There is no pericardial effusion.      Mediastinum and lymph nodes: No suspicious hilar or mediastinal  adenopathy..     Skeletal and soft tissues: Chest wall soft tissues are unremarkable. No  acute bony abnormality.       Upper abdomen: The imaged portion of the upper abdomen demonstrates no  acute process.        Impression:      1. No evidence of pulmonary embolus.  2. Enlarged left ventricle. Pulmonary edema with bilateral small  layering pleural effusions.  3. Endotracheal tube is in good position.        This report was finalized on 11/09/2022 14:11 by Dr Antonio Ahmadi, .    CT Head Without Contrast [[id number]] Collected: 11/09/22 1400     Updated: 11/09/22 1406    Narrative:      CT HEAD WO CONTRAST- 11/9/2022 1:47 AM CST     HISTORY: Neuro deficit, acute, stroke suspected       DOSE LENGTH PRODUCT: 1083 mGy cm. Automated exposure control was also  utilized to decrease patient radiation dose.     Technique:   Axial CT of the brain without IV contrast. Sagittal and coronal  reformations are also provided for review. Soft tissue and bone kernels  are available for interpretation.     Comparison: None.     Findings:      There is no evidence of acute large vascular territory infarct. No  intra-axial or extra-axial hemorrhage. No visualized mass  lesion or mass  effect. The ventricles, cortical sulci and basal cisterns are symmetric  and age appropriate.  Posterior fossa structures are unremarkable. The  scalp and calvarium are intact. Visualized paranasal sinuses and  mastoids are clear.        Impression:      Impression:    1. No acute intracranial process.  This report was finalized on 11/09/2022 14:02 by Dr Antonio Ahmadi, .    XR Chest 1 View [[id number]] Collected: 11/09/22 1300     Updated: 11/09/22 1306    Narrative:      XR CHEST 1 VW- 11/9/2022 12:40 PM CST     HISTORY: CVC placement, ETT placement verification     COMPARISON: None.     FINDINGS:      Status post endotracheal intubation. Endotracheal tube is in good  position. There is a right IJ central venous catheter with tip  projecting over the right atrium. Cardiomegaly is present. There are  bilateral hazy pulmonary infiltrates, central predominant, suggesting  pulmonary edema. Lungs are well expanded. No pleural effusion or  pneumothorax. Defibrillator pad is in place. No acute bony abnormality  seen.       Impression:      1. Endotracheal tube is in good position. Lungs are well expanded.  2. Bilateral central predominant hazy pulmonary infiltrates concerning  for pulmonary edema.  3. Central venous catheter is in good position.        This report was finalized on 11/09/2022 13:03 by Dr Antonio Ahmadi, .        Based on finding of pulmonary edema, I will give the patient Lasix IV and will just uptitrate levophed depending on response.    Electronically signed by Usama Rodriguez MD, 11/09/22, 3:53 PM CST.                 Electronically signed by Usama Rodriguez MD at 11/09/22 1555          Consult Notes (last 72 hours)      Shani Mendiola APRN at 11/09/22 1502      Consult Orders    1. Inpatient Pulmonology Consult [[id number]] ordered by Usama Rodriguez MD at 11/09/22 1354             Consult note completed    Electronically signed by Shani Mendiola APRN at  11/09/22 1502     Carlin Moseley MD at 11/09/22 1446              PULMONARY AND CRITICAL CARE CONSULT - [facility]    Trina Ro   MR# [id number]  Acct# [id number]  11/9/2022   14:54 CST    Referring Provider: Usama Rdoriguez*    Chief Complaint: Mechanically ventilated    HPI: We are consulted by Usama Rodriguez* to see this 42 y.o. female born on 1980.  Patient is currently unresponsive and intubated unable to provide any information.  Documentation indicates patient was brought in per EMS with CPR in progress.  EMS contacted by family for abnormal respirations and level of alertness.  Patient has a history of seizures.  Family also reported an episode of choking while eating a pork chop yesterday resulting in vomitus.  Patient received defibrillation x4 as well as epinephrine x1 dose.  She was also intubated.  We have been asked to help manage her ventilator status.  Current saturation 100 on PEEP of 8 and FiO2 0.8.  Passively ventilating.  During examination she coughed and appeared to have a fairly significant bronchospasm resulting in an adequate ventilation and high peak airway pressures.  Episode did resolve on its own.  Discussed with RT.  ABG and breathing treatment ordered for now.  No reported history of lung disease although she is an everyday smoker.  Technician at bedside to perform stat EEG.  There is also a MRI ordered.  She is on Levophed for blood pressure support.  Map 75.  No family present.    Past Medical History   has a past medical history of Anxiety state, Cancer (HCC), Endogenous obesity, History of malignant neoplasm of thyroid, Postoperative hypothyroidism, Type 2 diabetes mellitus (HCC), and Vitamin D deficiency.   has a past surgical history that includes Thyroidectomy; Cholecystectomy; and Tonsillectomy.  No Known Allergies  Medications  furosemide, 40 mg, Intravenous, Once  levETIRAcetam, 1,000 mg, Intravenous, Once  potassium chloride, 10  mEq, Intravenous, Q1H  thiamine (VITAMIN B1) IVPB, 200 mg, Intravenous, Once      norepinephrine, 0.02-0.3 mcg/kg/min  propofol, 5-50 mcg/kg/min, Last Rate: Stopped (11/09/22 1446)      Social History   reports that she has been smoking. She has never used smokeless tobacco. She reports that she does not drink alcohol and does not use drugs. Pt unable to provide history due to mechanical ventilated state  Family History  family history includes Breast cancer in an other family member; Cholelithiasis in an other family member; Diabetes in an other family member; Heart disease in an other family member; Hypertension in an other family member; Kidney disease in an other family member; Pancreatic cancer in an other family member; Prostate cancer in her father; Stroke in her paternal grandmother and another family member; Thyroid disease in an other family member. Pt unable to provide history due to mechanical ventilated state  Review of Systems:  Cannot obtain due to mechanical ventilated state  Physical Exam:  Temp:  [97.9 °F (36.6 °C)] 97.9 °F (36.6 °C)  Heart Rate:  [] 87  Resp:  [17] 17  BP: (103-126)/(62-92) 103/62  Arterial Line BP: (88-95)/(35-65) 89/63  FiO2 (%):  [50 %-80 %] 80 %No intake or output data in the 24 hours ending 11/09/22 1454      11/09/22  1257   Weight: 98.4 kg (217 lb)     SpO2 Percentage    11/09/22 1430 11/09/22 1435 11/09/22 1440   SpO2: 100% 100% 100%     Body mass index is 35.02 kg/m².  Vent Settings        Resp Rate (Set): 20     FiO2 (%): 80 %  PEEP/CPAP (cm H2O): 8 cm H20  Minute Ventilation (L/min) (Obs): 13.9 L/min           PIP Observed (cm H2O): 25 cm H2O  Plateau Pressure (cm H2O): 29 cm H2O        Physical Exam  Constitutional:       General: She is in acute distress.      Appearance: She is obese. She is ill-appearing. She is not toxic-appearing.   HENT:      Head: Normocephalic and atraumatic.      Comments: ETT  EEG leads     Right Ear: External ear normal.      Left Ear:  External ear normal.      Nose: Nose normal.   Eyes:      General:         Right eye: No discharge.         Left eye: No discharge.      Conjunctiva/sclera: Conjunctivae normal.   Neck:      Comments: Thick, triple lumen central line right neck  Cardiovascular:      Rate and Rhythm: Normal rate and regular rhythm.      Heart sounds: No murmur heard.  Pulmonary:      Effort: No tachypnea, accessory muscle usage or respiratory distress.      Breath sounds: Decreased air movement present.      Comments: Coughing, biting tube  Abdominal:      General: Abdomen is flat. Bowel sounds are normal. There is no distension.      Comments: obese   Genitourinary:     Comments: hardy  Musculoskeletal:         General: Normal range of motion.      Cervical back: Normal range of motion and neck supple.      Right lower leg: No edema.      Left lower leg: No edema.   Skin:     General: Skin is warm and dry.      Coloration: Skin is not jaundiced or pale.   Neurological:      Comments: intubated       Electronically signed by AIDEN Tarango, 11/9/2022, 14:54 CST      Physician Substantive Portion: Medical Decision Making    Results from last 7 days   Lab Units 11/09/22  1213   WBC 10*3/mm3 16.40*   HEMOGLOBIN g/dL 9.8*   PLATELETS 10*3/mm3 247     Results from last 7 days   Lab Units 11/09/22  1213   SODIUM mmol/L 136   POTASSIUM mmol/L 3.1*   CO2 mmol/L 16.0*   BUN mg/dL 8   CREATININE mg/dL 0.88   MAGNESIUM mg/dL 2.5   GLUCOSE mg/dL 256*     Results from last 7 days   Lab Units 11/09/22  1455 11/09/22  1306   PH, ARTERIAL pH units 7.250* 7.162*   PCO2, ARTERIAL mm Hg 43.6 43.8   PO2 ART mm Hg 122.0* 66.0*   FIO2 % 80 65     Lab Results   Component Value Date    PROBNP 14,804.0 (H) 11/09/2022     No results found for: BLOODCX, URINECX, WOUNDCX, MRSACX, RESPCX, STOOLCX    Recent radiology:   Imaging Results (Last 72 Hours)     Procedure Component Value Units Date/Time    CT Angiogram Chest [[id number]] Collected: 11/09/22 4906      Updated: 11/09/22 1414    Narrative:      CT ANGIOGRAM CHEST- 11/9/2022 1:47 AM CST      HISTORY: Pulmonary embolism (PE) suspected, unknown D-dimer      COMPARISON: Chest x-ray dated 11/9/2022.      DOSE LENGTH PRODUCT: 247 mGy cm. Automated exposure control was also  utilized to decrease patient radiation dose.     TECHNIQUE: Helical tomographic images of the chest were obtained after  the administration of intravenous contrast following angiogram protocol.  Additionally, 3D and multiplanar reformatted images were provided.        FINDINGS:    Pulmonary arteries: There is adequate enhancement of the pulmonary  arteries to evaluate for central and segmental pulmonary emboli. There  are no filling defects within the main, lobar, segmental or visualized  subsegmental pulmonary arteries. The pulmonary arteries are within  normal limits for size.      Aorta and great vessels: Thoracic aorta is normal in caliber. No  dissection identified. No flow-limiting stenosis identified at the great  vessel origins.     Visualized neck base: The imaged portion of the base of the neck appears  unremarkable.      Lungs: Bilateral septal thickening and mixed groundglass and  consolidative dependent airspace opacities reflecting pulmonary edema.  There are bilateral small layering pleural effusions. Endotracheal tube  is present with tip approximately 4.2 cm above the lori. No  pneumothorax. Minimal secretions in the trachea. Airways are otherwise  clear.     Heart: Enlarged left ventricle. There is no pericardial effusion.      Mediastinum and lymph nodes: No suspicious hilar or mediastinal  adenopathy..     Skeletal and soft tissues: Chest wall soft tissues are unremarkable. No  acute bony abnormality.       Upper abdomen: The imaged portion of the upper abdomen demonstrates no  acute process.        Impression:      1. No evidence of pulmonary embolus.  2. Enlarged left ventricle. Pulmonary edema with bilateral  small  layering pleural effusions.  3. Endotracheal tube is in good position.        This report was finalized on 11/09/2022 14:11 by Dr Antonio Ahmadi, .    CT Head Without Contrast [[id number]] Collected: 11/09/22 1400     Updated: 11/09/22 1406    Narrative:      CT HEAD WO CONTRAST- 11/9/2022 1:47 AM CST     HISTORY: Neuro deficit, acute, stroke suspected       DOSE LENGTH PRODUCT: 1083 mGy cm. Automated exposure control was also  utilized to decrease patient radiation dose.     Technique:   Axial CT of the brain without IV contrast. Sagittal and coronal  reformations are also provided for review. Soft tissue and bone kernels  are available for interpretation.     Comparison: None.     Findings:      There is no evidence of acute large vascular territory infarct. No  intra-axial or extra-axial hemorrhage. No visualized mass lesion or mass  effect. The ventricles, cortical sulci and basal cisterns are symmetric  and age appropriate.  Posterior fossa structures are unremarkable. The  scalp and calvarium are intact. Visualized paranasal sinuses and  mastoids are clear.        Impression:      Impression:    1. No acute intracranial process.  This report was finalized on 11/09/2022 14:02 by Dr Antonio Ahmadi, .    XR Chest 1 View [[id number]] Collected: 11/09/22 1300     Updated: 11/09/22 1306    Narrative:      XR CHEST 1 VW- 11/9/2022 12:40 PM CST     HISTORY: CVC placement, ETT placement verification     COMPARISON: None.     FINDINGS:      Status post endotracheal intubation. Endotracheal tube is in good  position. There is a right IJ central venous catheter with tip  projecting over the right atrium. Cardiomegaly is present. There are  bilateral hazy pulmonary infiltrates, central predominant, suggesting  pulmonary edema. Lungs are well expanded. No pleural effusion or  pneumothorax. Defibrillator pad is in place. No acute bony abnormality  seen.       Impression:      1. Endotracheal tube is in good position.  Lungs are well expanded.  2. Bilateral central predominant hazy pulmonary infiltrates concerning  for pulmonary edema.  3. Central venous catheter is in good position.        This report was finalized on 11/09/2022 13:03 by Dr Antonio Ahmadi, .        My radiograph interpretation/independent review of imaging: Reviewed and agree with current interpretation.  Patient has bilateral pulm infiltrate and pleural effusions.  It is most likely volume overload with elevated BNP but possibility of aspiration pneumonia cannot be ruled out.  Other test results (not lab or imaging): Results for orders placed during the hospital encounter of 11/09/22    Adult Transthoracic Echo Complete W/ Cont if Necessary Per Protocol    Interpretation Summary  •  Left ventricular systolic function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

## 2022-11-10 NOTE — CONSULTS
"[facility] CONSULT NOTE    Referring Provider: Dr. Usama Rodriguez MD    Reason for Consultation: \" Cardiomyopathy, status postcardiopulmonary resuscitation.  Found with seizure\"    Chief Complaint   Patient presents with   • Cardiac Arrest       Subjective .     History of present illness:  Trina Ro is a 42 y.o. female with a known PMH significant for thyroid cancer, status post thyroidectomy, type 2 diabetes mellitus, anxiety, hepatitis C positive, asthma, current smoker, obesity, and has previously been evaluated for seizures.  She presented to the emergency department via EMS after a resting at home.  Her  was home with her heard noises in the other room they went over to evaluate the patient and she was lying in the floor unresponsive and pulseless.  Reportedly EMS and the fire department arrived began CPR and also delivered for shocks.  She was reported to be in PEA arrest on arrival.  Epinephrine was given.  She was intubated.     She is currently sedated and intubated.  Pulmonology is following for ventilator management.  She is being seen by neurology.  She has had an EEG and has plans for MRI today.  Neurology is concern for hypoxic ischemic encephalopathy status post prolonged cardiac arrest.  An echocardiogram was ordered and performed yesterday.  Left ventricular systolic function was severely decreased with LVEF estimated to be 26 to 30%.  Cardiology was consulted by the primary service for cardiomyopathy and status post cardiopulmonary arrest.    She can provide no history currently.  She is hemodynamically stable.  She is oxygenating via the ventilator.  Her mother is at the bedside.    History  Past Medical History:   Diagnosis Date   • Anxiety state    • Cancer (HCC)     THYROID   • Endogenous obesity    • History of malignant neoplasm of thyroid    • Postoperative hypothyroidism    • Type 2 diabetes mellitus (HCC)    • Vitamin D deficiency    ,   Past " Surgical History:   Procedure Laterality Date   • CHOLECYSTECTOMY     • THYROIDECTOMY     • TONSILLECTOMY     ,   Family History   Problem Relation Age of Onset   • Hypertension Other    • Thyroid disease Other    • Diabetes Other    • Heart disease Other    • Stroke Other    • Breast cancer Other    • Cholelithiasis Other    • Kidney disease Other    • Pancreatic cancer Other    • Prostate cancer Father    • Stroke Paternal Grandmother    ,   Social History     Tobacco Use   • Smoking status: Every Day     Types: Cigarettes   • Smokeless tobacco: Never   • Tobacco comments:     smoking cessation encouraged    Substance Use Topics   • Alcohol use: No   • Drug use: No   ,     Medications  Current Facility-Administered Medications   Medication Dose Route Frequency Provider Last Rate Last Admin   • acetaminophen (TYLENOL) suppository 650 mg  650 mg Rectal Q4H PRN Usama Rodriguez MD       • artificial tears ophthalmic ointment   Both Eyes Q4H Usama Rodriguez MD   Given at 11/10/22 0803   • budesonide (PULMICORT) nebulizer solution 0.5 mg  0.5 mg Nebulization BID - RT Carlin Moseley MD   0.5 mg at 11/10/22 0620   • Chlorhexidine Gluconate Cloth 2 % pads 1 application  1 application Topical Q24H Usama Rodriguez MD   1 application at 11/10/22 0432   • Enoxaparin Sodium (LOVENOX) syringe 40 mg  40 mg Subcutaneous Daily Shani Mendiola APRN   40 mg at 11/09/22 1645   • famotidine (PEPCID) injection 20 mg  20 mg Intravenous Q12H Usama Rodriguez MD   20 mg at 11/10/22 0808   • furosemide (LASIX) injection 40 mg  40 mg Intravenous Q12H Usama Rodriguez MD   40 mg at 11/10/22 0845   • influenza vac split quad (FLUZONE,FLUARIX,AFLURIA,FLULAVAL) injection 0.5 mL  0.5 mL Intramuscular During Hospitalization Usama Rodriguez MD       • ipratropium-albuterol (DUO-NEB) nebulizer solution 3 mL  3 mL Nebulization 4x Daily - RT Shani Mendiola APRN   3 mL at 11/10/22 0956    • levETIRAcetam in NaCl 0.75% (KEPPRA) IVPB 1,000 mg  1,000 mg Intravenous Q12H Rosie Nunes MD   1,000 mg at 11/10/22 0803   • levothyroxine sodium injection 88 mcg  88 mcg Intravenous Daily Usama Rodriguez MD       • LORazepam (ATIVAN) injection 1 mg  1 mg Intravenous Q4H PRN Usama Rodriguez MD       • methylPREDNISolone sodium succinate (SOLU-Medrol) injection 40 mg  40 mg Intravenous Q12H Shani Mendiola APRN       • mupirocin (BACTROBAN) 2 % nasal ointment 1 application  1 application Each Nare BID Usama Rodriguez MD   1 application at 11/10/22 0803   • norepinephrine (LEVOPHED) 8 mg in 250 mL NS infusion (premix)  0.02-0.3 mcg/kg/min Intravenous Titrated Usama Rodriguez MD   Stopped at 11/10/22 0100   • ondansetron (ZOFRAN) injection 4 mg  4 mg Intravenous Q6H PRN Usama Rodriguez MD       • Pharmacy to Dose enoxaparin (LOVENOX)   Does not apply Continuous PRN Usama oRdriguez MD       • Pharmacy to Dose Zosyn   Does not apply Continuous PRN Carlin Moseley MD       • piperacillin-tazobactam (ZOSYN) 4.5 g/100 mL 0.9% NS IVPB (mbp)  4.5 g Intravenous Q8H Carlin Moseley MD   4.5 g at 11/10/22 0420   • propofol (DIPRIVAN) infusion 10 mg/mL 100 mL  5-50 mcg/kg/min Intravenous Titrated Usama Rodriguez MD 29.5 mL/hr at 11/10/22 0843 50 mcg/kg/min at 11/10/22 0843   • sodium chloride 0.9 % flush 10 mL  10 mL Intravenous PRN Usama Rodriguez MD       • sodium chloride 0.9 % flush 10 mL  10 mL Intravenous PRN Usama Rodriguez MD       • sodium chloride 0.9 % flush 10 mL  10 mL Intravenous Q12H Usama Rodriguez MD   10 mL at 11/10/22 0806   • sodium chloride 0.9 % flush 10 mL  10 mL Intravenous PRN Usama Rodriguez MD           Allergies:  Patient has no known allergies.    Review of Systems  Review of Systems   Unable to perform ROS: intubated       Objective     Physical Exam:  Patient Vitals for  the past 24 hrs:   BP Temp Temp src Pulse Resp SpO2 Height Weight   11/10/22 0900 111/75 -- -- 96 -- 96 % -- --   11/10/22 0800 105/71 99.6 °F (37.6 °C) Bladder 97 -- 95 % -- --   11/10/22 0700 109/74 -- -- 99 -- 95 % -- --   11/10/22 0630 -- -- -- 97 -- 95 % -- --   11/10/22 0628 -- -- -- 96 26 95 % -- --   11/10/22 0620 -- -- -- 96 24 96 % -- --   11/10/22 0600 103/72 -- -- 96 -- 95 % -- --   11/10/22 0530 100/71 -- -- 96 -- 95 % -- --   11/10/22 0515 104/68 99.9 °F (37.7 °C) Bladder 97 -- 94 % -- --   11/10/22 0500 105/73 -- -- 97 -- 95 % -- --   11/10/22 0445 105/77 -- -- 98 -- 95 % -- --   11/10/22 0430 106/76 -- -- 97 -- 96 % -- --   11/10/22 0415 102/73 -- -- 100 -- 96 % -- --   11/10/22 0400 105/71 100.1 °F (37.8 °C) Bladder 100 -- 95 % -- --   11/10/22 0345 104/72 -- -- 102 -- 96 % -- --   11/10/22 0330 101/72 -- -- 101 -- 95 % -- --   11/10/22 0315 104/72 -- -- 103 -- 95 % -- --   11/10/22 0302 -- -- -- -- -- -- -- 95.5 kg (210 lb 8 oz)   11/10/22 0300 99/67 -- -- 104 -- 95 % -- --   11/10/22 0245 103/71 -- -- 104 -- 97 % -- --   11/10/22 0230 -- -- -- 106 -- 96 % -- --   11/10/22 0215 133/84 -- -- 106 -- 97 % -- --   11/10/22 0200 130/86 (!) 100.6 °F (38.1 °C) Bladder 108 -- 98 % -- --   11/10/22 0145 135/82 -- -- 108 -- 98 % -- --   11/10/22 0115 130/85 -- -- 108 -- 96 % -- --   11/10/22 0100 125/88 -- -- 106 -- 98 % -- --   11/10/22 0045 127/79 -- -- 105 -- 98 % -- --   11/10/22 0030 125/81 -- -- 104 -- 98 % -- --   11/10/22 0015 122/83 -- -- 104 -- 98 % -- --   11/10/22 0000 123/80 100.1 °F (37.8 °C) Bladder 104 -- 99 % -- --   11/09/22 2345 120/83 -- -- 103 -- 98 % -- --   11/09/22 2330 116/78 -- -- 101 -- 98 % -- --   11/09/22 2315 114/78 -- -- 100 -- 98 % -- --   11/09/22 2300 109/75 100 °F (37.8 °C) Bladder 98 -- 98 % -- --   11/09/22 2245 129/82 -- -- 95 -- 99 % -- --   11/09/22 2230 125/82 -- -- 93 -- 99 % -- --   11/09/22 2215 126/80 -- -- 91 -- 100 % -- --   11/09/22 2200 131/84 99.8 °F (37.7  "°C) Bladder 92 -- 99 % -- --   11/09/22 2145 131/88 -- -- 94 -- 99 % -- --   11/09/22 2130 125/82 -- -- 90 -- 100 % -- --   11/09/22 2115 116/79 -- -- 86 -- 100 % -- --   11/09/22 2100 116/78 -- -- 85 -- 100 % -- --   11/09/22 2045 114/78 -- -- 85 -- 100 % -- --   11/09/22 2000 116/78 -- -- 84 -- 99 % -- --   11/09/22 1945 114/72 98.9 °F (37.2 °C) Bladder 82 -- 98 % -- --   11/09/22 1932 111/79 -- -- 79 20 98 % -- --   11/09/22 1930 -- -- -- 79 -- 99 % -- --   11/09/22 1926 -- -- -- -- 24 -- -- --   11/09/22 1915 112/76 -- -- 80 -- 98 % -- --   11/09/22 1913 -- -- -- 79 20 98 % -- --   11/09/22 1900 112/77 -- -- 80 -- 99 % -- --   11/09/22 1845 112/74 -- -- 79 -- 98 % -- --   11/09/22 1830 108/79 -- -- 79 -- 98 % -- --   11/09/22 1815 107/72 -- -- 77 -- 100 % -- --   11/09/22 1800 110/79 -- -- 78 -- 100 % -- --   11/09/22 1745 104/78 -- -- 78 -- 100 % -- --   11/09/22 1730 105/75 -- -- 78 -- 100 % -- --   11/09/22 1715 106/71 -- -- 78 -- 99 % -- --   11/09/22 1700 116/77 -- -- 79 -- 99 % -- --   11/09/22 1645 103/69 -- -- 81 -- 99 % -- --   11/09/22 1630 103/93 -- -- 82 -- 99 % -- --   11/09/22 1615 139/92 97.8 °F (36.6 °C) Bladder 82 -- 99 % -- --   11/09/22 1600 139/83 -- -- 84 -- 98 % -- --   11/09/22 1545 137/81 -- -- 84 -- 97 % -- --   11/09/22 1530 132/85 -- -- 83 -- 99 % -- --   11/09/22 1515 141/89 -- -- 83 -- 99 % -- --   11/09/22 1500 122/74 -- -- 87 -- 98 % -- --   11/09/22 1458 -- -- -- 94 -- 96 % -- --   11/09/22 1445 106/63 -- -- 91 -- 100 % -- --   11/09/22 1440 -- -- -- 87 -- 100 % -- --   11/09/22 1435 -- -- -- 91 -- 100 % -- --   11/09/22 1430 103/62 -- -- 89 -- 100 % -- --   11/09/22 1425 -- -- -- 92 -- 100 % -- --   11/09/22 1420 -- -- -- 95 -- 100 % -- --   11/09/22 1257 -- 97.9 °F (36.6 °C) Axillary -- -- -- -- 98.4 kg (217 lb)   11/09/22 1225 -- -- -- 120 17 96 % 167.6 cm (66\") --   11/09/22 1216 -- -- -- 105 -- 100 % -- --   11/09/22 1211 -- -- -- (!) 134 -- 91 % -- --   11/09/22 1210 -- -- " -- -- -- 100 % -- --   11/09/22 1209 -- -- -- -- -- (!) 60 % -- --   11/09/22 1208 -- -- -- (!) 148 -- -- -- --   11/09/22 1207 126/92 -- -- -- -- -- -- --     Vitals reviewed.   Constitutional:       Appearance: Normal appearance. Well-developed and well-groomed. Obese. Acutely ill-appearing.      Interventions: Sedated and intubated.   HENT:      Head: Normocephalic.   Pulmonary:      Effort: Pulmonary effort is normal. Intubated.      Comments: Mechanically ventilated  Cardiovascular:      Normal rate. Regular rhythm.      Murmurs: There is no murmur.      No gallop. No rub.   Edema:     Peripheral edema absent.   Abdominal:      General: There is no distension.   Musculoskeletal:      Cervical back: Neck supple. Skin:     General: Skin is warm and dry.         Results Review:   I reviewed the patient's new clinical results.    Lab Results (last 72 hours)     Procedure Component Value Units Date/Time    Blood Gas, Arterial - [[id number]]  (Abnormal) Collected: 11/10/22 0411    Specimen: Arterial Blood Updated: 11/10/22 0734     Site Arterial Line     Dagoberto's Test N/A     pH, Arterial 7.464 pH units      Comment: 83 Value above reference range        pCO2, Arterial 27.2 mm Hg      Comment: 84 Value below reference range        pO2, Arterial 67.8 mm Hg      Comment: 84 Value below reference range        HCO3, Arterial 19.5 mmol/L      Comment: 84 Value below reference range        Base Excess, Arterial -3.3 mmol/L      Comment: 84 Value below reference range        O2 Saturation, Arterial 95.7 %      Temperature 37.0 C      Barometric Pressure for Blood Gas 754 mmHg      Modality Ventilator     FIO2 40 %      Ventilator Mode AC     Set Tidal Volume 500     Set Mech Resp Rate 20.0     PEEP 5.0     Collected by [id number]     Comment: Meter: [id number]     :  [id number]        pCO2, Temperature Corrected 27.2 mm Hg      pH, Temp Corrected 7.464 pH Units      pO2, Temperature Corrected 67.8 mm Hg      Respiratory Culture - Sputum, Bronchus [[id number]] Collected: 11/09/22 2249    Specimen: Sputum from Bronchus Updated: 11/10/22 0558     Gram Stain Moderate (3+) WBCs seen      Moderate (3+) Mixed gram positive johnny    Comprehensive Metabolic Panel [[id number]]  (Abnormal) Collected: 11/10/22 0430    Specimen: Blood Updated: 11/10/22 0530     Glucose 220 mg/dL      BUN 15 mg/dL      Creatinine 1.01 mg/dL      Sodium 135 mmol/L      Potassium 3.6 mmol/L      Comment: Slight hemolysis detected by analyzer. Results may be affected.        Chloride 104 mmol/L      CO2 19.0 mmol/L      Calcium 7.9 mg/dL      Total Protein 6.6 g/dL      Albumin 3.40 g/dL      ALT (SGPT) 14 U/L      AST (SGOT) 30 U/L      Alkaline Phosphatase 69 U/L      Total Bilirubin 0.3 mg/dL      Globulin 3.2 gm/dL      A/G Ratio 1.1 g/dL      BUN/Creatinine Ratio 14.9     Anion Gap 12.0 mmol/L      eGFR 70.5 mL/min/1.73      Comment: National Kidney Foundation and American Society of Nephrology (ASN) Task Force recommended calculation based on the Chronic Kidney Disease Epidemiology Collaboration (CKD-EPI) equation refit without adjustment for race.       Narrative:      GFR Normal >60  Chronic Kidney Disease <60  Kidney Failure <15      Troponin [[id number]]  (Abnormal) Collected: 11/10/22 0454    Specimen: Blood Updated: 11/10/22 0528     Troponin T 0.297 ng/mL     Narrative:      Troponin T Reference Range:  <= 0.03 ng/mL-   Negative for AMI  >0.03 ng/mL-     Abnormal for myocardial necrosis.  Clinicians would have to utilize clinical acumen, EKG, Troponin and serial changes to determine if it is an Acute Myocardial Infarction or myocardial injury due to an underlying chronic condition.       Results may be falsely decreased if patient taking Biotin.      CK [[id number]]  (Abnormal) Collected: 11/10/22 0430    Specimen: Blood Updated: 11/10/22 0507     Creatine Kinase 302 U/L     CBC & Differential [[id number]]  (Abnormal) Collected: 11/10/22 0430     Specimen: Blood Updated: 11/10/22 0450    Narrative:      The following orders were created for panel order CBC & Differential.  Procedure                               Abnormality         Status                     ---------                               -----------         ------                     CBC Auto Differential[[id number]]        Abnormal            Final result                 Please view results for these tests on the individual orders.    CBC Auto Differential [[id number]]  (Abnormal) Collected: 11/10/22 0430    Specimen: Blood Updated: 11/10/22 0450     WBC 21.24 10*3/mm3      RBC 4.09 10*6/mm3      Hemoglobin 9.9 g/dL      Hematocrit 32.4 %      MCV 79.2 fL      MCH 24.2 pg      MCHC 30.6 g/dL      RDW 15.9 %      RDW-SD 45.2 fl      MPV 10.2 fL      Platelets 279 10*3/mm3      Neutrophil % 92.1 %      Lymphocyte % 5.5 %      Monocyte % 1.4 %      Eosinophil % 0.0 %      Basophil % 0.2 %      Immature Grans % 0.8 %      Neutrophils, Absolute 19.59 10*3/mm3      Lymphocytes, Absolute 1.16 10*3/mm3      Monocytes, Absolute 0.29 10*3/mm3      Eosinophils, Absolute 0.00 10*3/mm3      Basophils, Absolute 0.04 10*3/mm3      Immature Grans, Absolute 0.16 10*3/mm3      nRBC 0.0 /100 WBC     Folate [[id number]]  (Abnormal) Collected: 11/09/22 1451    Specimen: Blood Updated: 11/10/22 0141     Folate 2.52 ng/mL     Narrative:      Results may be falsely increased if patient taking Biotin.      Vitamin B12 [[id number]]  (Normal) Collected: 11/09/22 1451    Specimen: Blood Updated: 11/10/22 0141     Vitamin B-12 267 pg/mL     Narrative:      Results may be falsely increased if patient taking Biotin.      T3, Free [[id number]]  (Abnormal) Collected: 11/09/22 1451    Specimen: Blood Updated: 11/10/22 0141     T3, Free 6.26 pg/mL     Narrative:      Results may be falsely increased if patient taking Biotin.      Potassium [[id number]]  (Normal) Collected: 11/09/22 2315    Specimen: Blood Updated: 11/09/22 2332      Potassium 3.6 mmol/L     POC Glucose Once [[id number]]  (Abnormal) Collected: 11/09/22 2314    Specimen: Blood Updated: 11/09/22 2325     Glucose 275 mg/dL      Comment: : pushpa Rivas HavenMeter ID: [id number]       Blood Culture - Blood, Hand, Right [[id number]] Collected: 11/09/22 2129    Specimen: Blood from Hand, Right Updated: 11/09/22 2206    STAT Lactic Acid, Reflex [[id number]]  (Normal) Collected: 11/09/22 1719    Specimen: Blood Updated: 11/09/22 1807     Lactate 1.7 mmol/L     CBC & Differential [[id number]]  (Abnormal) Collected: 11/09/22 1213    Specimen: Blood Updated: 11/09/22 1612    Narrative:      The following orders were created for panel order CBC & Differential.  Procedure                               Abnormality         Status                     ---------                               -----------         ------                     CBC Auto Differential[[id number]]        Abnormal            Final result               Slide Review, Hematology[[id number]]                         Final result                 Please view results for these tests on the individual orders.    Slide Review, Hematology [[id number]] Collected: 11/09/22 1213    Specimen: Blood Updated: 11/09/22 1612     Performed by: Madison Tolentino MD     Pathologist Interpretation Leukocytosis with the following manual differential:  Neutrophils 38%  Bands 3%  Lymphocytes 51%  Monocytes 1%  Eosinophils 2%  Metamyelocytes 3%  Myelocytes 2%  Nucleated red blood cells 2  Platelets within normal range    Iron Profile [[id number]]  (Abnormal) Collected: 11/09/22 1213    Specimen: Blood Updated: 11/09/22 1541     Iron 40 mcg/dL      Iron Saturation 10 %      Transferrin 256 mg/dL      TIBC 381 mcg/dL     Ferritin [[id number]]  (Normal) Collected: 11/09/22 1213    Specimen: Blood Updated: 11/09/22 1541     Ferritin 64.75 ng/mL     Narrative:      Results may be falsely decreased if patient taking Biotin.      Lipid Panel  [[id number]]  (Abnormal) Collected: 11/09/22 1213    Specimen: Blood Updated: 11/09/22 1541     Total Cholesterol 141 mg/dL      Triglycerides 81 mg/dL      HDL Cholesterol 27 mg/dL      LDL Cholesterol  98 mg/dL      VLDL Cholesterol 16 mg/dL      LDL/HDL Ratio 3.62    Narrative:      Cholesterol Reference Ranges  (U.S. Department of Health and Human Services ATP III Classifications)    Desirable          <200 mg/dL  Borderline High    200-239 mg/dL  High Risk          >240 mg/dL      Triglyceride Reference Ranges  (U.S. Department of Health and Human Services ATP III Classifications)    Normal           <150 mg/dL  Borderline High  150-199 mg/dL  High             200-499 mg/dL  Very High        >500 mg/dL    HDL Reference Ranges  (U.S. Department of Health and Human Services ATP III Classifications)    Low     <40 mg/dl (major risk factor for CHD)  High    >60 mg/dl ('negative' risk factor for CHD)        LDL Reference Ranges  (U.S. Department of Health and Human Services ATP III Classifications)    Optimal          <100 mg/dL  Near Optimal     100-129 mg/dL  Borderline High  130-159 mg/dL  High             160-189 mg/dL  Very High        >189 mg/dL    T4, Free [[id number]]  (Abnormal) Collected: 11/09/22 1213    Specimen: Blood Updated: 11/09/22 1541     Free T4 0.76 ng/dL     Narrative:      Results may be falsely increased if patient taking Biotin.      STAT Lactic Acid, Reflex [[id number]]  (Abnormal) Collected: 11/09/22 1451    Specimen: Blood Updated: 11/09/22 1537     Lactate 2.5 mmol/L     Urine Drug Screen - Urine, Clean Catch [[id number]]  (Normal) Collected: 11/09/22 1505    Specimen: Urine, Clean Catch Updated: 11/09/22 1524     THC, Screen, Urine Negative     Phencyclidine (PCP), Urine Negative     Cocaine Screen, Urine Negative     Methamphetamine, Ur Negative     Opiate Screen Negative     Amphetamine Screen, Urine Negative     Benzodiazepine Screen, Urine Negative     Tricyclic Antidepressants Screen  Negative     Methadone Screen, Urine Negative     Barbiturates Screen, Urine Negative     Oxycodone Screen, Urine Negative     Propoxyphene Screen Negative     Buprenorphine, Screen, Urine Negative    Narrative:      Cutoff For Drugs Screened:    Amphetamines               500 ng/ml  Barbiturates               200 ng/ml  Benzodiazepines            150 ng/ml  Cocaine                    150 ng/ml  Methadone                  200 ng/ml  Opiates                    100 ng/ml  Phencyclidine               25 ng/ml  THC                            50 ng/ml  Methamphetamine            500 ng/ml  Tricyclic Antidepressants  300 ng/ml  Oxycodone                  100 ng/ml  Propoxyphene               300 ng/ml  Buprenorphine               10 ng/ml    The normal value for all drugs tested is negative. This report includes unconfirmed screening results, with the cutoff values listed, to be used for medical treatment purposes only.  Unconfirmed results must not be used for non-medical purposes such as employment or legal testing.  Clinical consideration should be applied to any drug of abuse test, particularly when unconfirmed results are used.      Hemoglobin A1c [[id number]]  (Abnormal) Collected: 11/09/22 1213    Specimen: Blood Updated: 11/09/22 1455     Hemoglobin A1C 6.40 %     Narrative:      Hemoglobin A1C Ranges:    Increased Risk for Diabetes  5.7% to 6.4%  Diabetes                     >= 6.5%  Diabetic Goal                < 7.0%    Blood Gas, Arterial - [[id number]]  (Abnormal) Collected: 11/09/22 1455    Specimen: Arterial Blood Updated: 11/09/22 1449     Site Arterial Line     Dagoberto's Test N/A     pH, Arterial 7.250 pH units      Comment: 84 Value below reference range        pCO2, Arterial 43.6 mm Hg      pO2, Arterial 122.0 mm Hg      Comment: 83 Value above reference range        HCO3, Arterial 19.1 mmol/L      Comment: 84 Value below reference range        Base Excess, Arterial -7.8 mmol/L      Comment: 84 Value below  reference range        O2 Saturation, Arterial 98.8 %      Temperature 37.0 C      Barometric Pressure for Blood Gas 755 mmHg      Modality Ventilator     FIO2 80 %      Ventilator Mode AC     Set Tidal Volume 500     Set Mech Resp Rate 20.0     PEEP 8.0     Collected by [id number]     Comment: Meter: [id number]     :  [id number]        pCO2, Temperature Corrected 43.6 mm Hg      pH, Temp Corrected 7.250 pH Units      pO2, Temperature Corrected 122 mm Hg     Urinalysis With Culture If Indicated - Urine, Catheter [[id number]]  (Abnormal) Collected: 11/09/22 1342    Specimen: Urine, Catheter Updated: 11/09/22 1427     Color, UA Yellow     Appearance, UA Cloudy     pH, UA 7.0     Specific Gravity, UA 1.015     Glucose,  mg/dL (2+)     Ketones, UA Negative     Bilirubin, UA Negative     Blood, UA Small (1+)     Protein, UA >=300 mg/dL (3+)     Leuk Esterase, UA Negative     Nitrite, UA Negative     Urobilinogen, UA 1.0 E.U./dL    Narrative:      In absence of clinical symptoms, the presence of pyuria, bacteria, and/or nitrites on the urinalysis result does not correlate with infection.    Urinalysis, Microscopic Only - Urine, Catheter [[id number]]  (Abnormal) Collected: 11/09/22 1342    Specimen: Urine, Catheter Updated: 11/09/22 1427     RBC, UA 0-2 /HPF      WBC, UA 3-5 /HPF      Comment: Urine culture not indicated.        Bacteria, UA Trace /HPF      Squamous Epithelial Cells, UA None Seen /HPF      Hyaline Casts, UA None Seen /LPF      Amorphous Crystals, UA Moderate/2+ /HPF      Methodology Manual Light Microscopy    Magnesium [[id number]]  (Normal) Collected: 11/09/22 1213    Specimen: Blood Updated: 11/09/22 1425     Magnesium 2.5 mg/dL     POC Urine Pregnancy [[id number]]  (Normal) Resulted: 11/09/22 1345    Specimen: Urine Updated: 11/09/22 1347     HCG, Urine, QL Negative     Lot Number [id number]     Internal Positive Control Positive     Internal Negative Control Negative     Expiration Date  "02/29/2024    BNP [[id number]]  (Abnormal) Collected: 11/09/22 1213    Specimen: Blood Updated: 11/09/22 1342     proBNP 14,804.0 pg/mL     Narrative:      Among patients with dyspnea, NT-proBNP is highly sensitive for the detection of acute congestive heart failure. In addition NT-proBNP of <300 pg/ml effectively rules out acute congestive heart failure with 99% negative predictive value.      TSH [[id number]]  (Abnormal) Collected: 11/09/22 1213    Specimen: Blood Updated: 11/09/22 1331     TSH 41.480 uIU/mL     Procalcitonin [[id number]]  (Normal) Collected: 11/09/22 1213    Specimen: Blood Updated: 11/09/22 1330     Procalcitonin 0.05 ng/mL     Narrative:      As a Marker for Sepsis (Non-Neonates):    1. <0.5 ng/mL represents a low risk of severe sepsis and/or septic shock.  2. >2 ng/mL represents a high risk of severe sepsis and/or septic shock.    As a Marker for Lower Respiratory Tract Infections that require antibiotic therapy:    PCT on Admission    Antibiotic Therapy       6-12 Hrs later    >0.5                Strongly Recommended  >0.25 - <0.5        Recommended   0.1 - 0.25          Discouraged              Remeasure/reassess PCT  <0.1                Strongly Discouraged     Remeasure/reassess PCT    As 28 day mortality risk marker: \"Change in Procalcitonin Result\" (>80% or <=80%) if Day 0 (or Day 1) and Day 4 values are available. Refer to [email or web address]-pct-[email or web address]    Change in PCT <=80%  A decrease of PCT levels below or equal to 80% defines a positive change in PCT test result representing a higher risk for 28-day all-cause mortality of patients diagnosed with severe sepsis for septic shock.    Change in PCT >80%  A decrease of PCT levels of more than 80% defines a negative change in PCT result representing a lower risk for 28-day all-cause mortality of patients diagnosed with severe sepsis or septic shock.       CBC Auto Differential [[id number]]  (Abnormal) Collected: 11/09/22 1213    " Specimen: Blood Updated: 11/09/22 1330     WBC 16.40 10*3/mm3      RBC 4.00 10*6/mm3      Hemoglobin 9.8 g/dL      Hematocrit 34.1 %      MCV 85.3 fL      MCH 24.5 pg      MCHC 28.7 g/dL      RDW 15.8 %      RDW-SD 49.3 fl      MPV 10.7 fL      Platelets 247 10*3/mm3     Manual Differential [[id number]]  (Abnormal) Collected: 11/09/22 1213    Specimen: Blood Updated: 11/09/22 1329     Neutrophil % 35.2 %      Lymphocyte % 43.8 %      Monocyte % 3.1 %      Eosinophil % 1.6 %      Bands %  7.8 %      Metamyelocyte % 3.9 %      Myelocyte % 1.6 %      Atypical Lymphocyte % 0.8 %      Neutrophils Absolute 7.05 10*3/mm3      Lymphocytes Absolute 7.31 10*3/mm3      Monocytes Absolute 0.51 10*3/mm3      Eosinophils Absolute 0.26 10*3/mm3      nRBC 2.3 /100 WBC      Anisocytosis Mod/2+     Crenated RBC's Large/3+     Microcytes Slight/1+     Ovalocytes Slight/1+     Poikilocytes Large/3+     Polychromasia Mod/2+     WBC Morphology Normal     Platelet Estimate Adequate    Lactic Acid, Plasma [[id number]]  (Abnormal) Collected: 11/09/22 1213    Specimen: Blood Updated: 11/09/22 1329     Lactate 7.1 mmol/L     Comprehensive Metabolic Panel [[id number]]  (Abnormal) Collected: 11/09/22 1213    Specimen: Blood Updated: 11/09/22 1325     Glucose 256 mg/dL      BUN 8 mg/dL      Creatinine 0.88 mg/dL      Sodium 136 mmol/L      Potassium 3.1 mmol/L      Comment: Slight hemolysis detected by analyzer. Results may be affected.        Chloride 102 mmol/L      CO2 16.0 mmol/L      Calcium 8.3 mg/dL      Total Protein 6.1 g/dL      Albumin 2.90 g/dL      ALT (SGPT) 7 U/L      AST (SGOT) 17 U/L      Alkaline Phosphatase 79 U/L      Total Bilirubin 0.3 mg/dL      Globulin 3.2 gm/dL      A/G Ratio 0.9 g/dL      BUN/Creatinine Ratio 9.1     Anion Gap 18.0 mmol/L      eGFR 83.2 mL/min/1.73      Comment: National Kidney Foundation and American Society of Nephrology (ASN) Task Force recommended calculation based on the Chronic Kidney Disease  Epidemiology Collaboration (CKD-EPI) equation refit without adjustment for race.       Narrative:      GFR Normal >60  Chronic Kidney Disease <60  Kidney Failure <15      Troponin [[id number]]  (Normal) Collected: 11/09/22 1213    Specimen: Blood Updated: 11/09/22 1320     Troponin T 0.021 ng/mL     Narrative:      Troponin T Reference Range:  <= 0.03 ng/mL-   Negative for AMI  >0.03 ng/mL-     Abnormal for myocardial necrosis.  Clinicians would have to utilize clinical acumen, EKG, Troponin and serial changes to determine if it is an Acute Myocardial Infarction or myocardial injury due to an underlying chronic condition.       Results may be falsely decreased if patient taking Biotin.      Lipase [[id number]]  (Normal) Collected: 11/09/22 1213    Specimen: Blood Updated: 11/09/22 1320     Lipase 28 U/L     Ethanol [[id number]] Collected: 11/09/22 1213    Specimen: Blood Updated: 11/09/22 1320     Ethanol % <0.010 %     Narrative:      Not for legal purposes. Chain of Custody not followed.     Blood Culture - Blood, Arm, Right [[id number]] Collected: 11/09/22 1213    Specimen: Blood from Arm, Right Updated: 11/09/22 1317    Protime-INR [[id number]]  (Abnormal) Collected: 11/09/22 1213    Specimen: Blood Updated: 11/09/22 1314     Protime 15.6 Seconds      INR 1.29    aPTT [[id number]]  (Abnormal) Collected: 11/09/22 1213    Specimen: Blood Updated: 11/09/22 1314     PTT 39.6 seconds     Blood Gas, Arterial - [[id number]]  (Abnormal) Collected: 11/09/22 1306    Specimen: Arterial Blood Updated: 11/09/22 1304     Site Arterial Line     Dagoberto's Test N/A     pH, Arterial 7.162 pH units      Comment: 85 Value below critical limit        pCO2, Arterial 43.8 mm Hg      pO2, Arterial 66.0 mm Hg      Comment: 84 Value below reference range        HCO3, Arterial 15.7 mmol/L      Comment: 84 Value below reference range        Base Excess, Arterial -12.4 mmol/L      Comment: 84 Value below reference range        O2 Saturation,  Arterial 86.6 %      Comment: 84 Value below reference range        Temperature 37.0 C      Barometric Pressure for Blood Gas 756 mmHg      Modality Ventilator     FIO2 65 %      Ventilator Mode AC     Set Tidal Volume 500     Set Mech Resp Rate 20.0     PEEP 5.0     Notified Who DR NEWSOME     Notified By [id number]     Notified Time 11/09/2022 13:08     Collected by [id number]     Comment: Meter: [id number]     :  [id number]        pCO2, Temperature Corrected 43.8 mm Hg      pH, Temp Corrected 7.162 pH Units      pO2, Temperature Corrected 66.0 mm Hg           Imaging Results (Last 72 Hours)     Procedure Component Value Units Date/Time    XR Chest 1 View [[id number]] Collected: 11/10/22 0740     Updated: 11/10/22 0745    Narrative:      HISTORY: Intubated, lung infiltrates     CXR: A frontal view the chest obtained. Patient rotated to the left.     COMPARISON: 11/9/2022     FINDINGS: Endotracheal tube tip appropriate in position at the T2/T3  level. Enteric tube descends into the abdomen. Right IJ central line in  place with tip overlying the SVC.     Stable mild cardiomegaly. Small left pleural effusion. Perihilar  densities favoring mild edema, improved from yesterday's exam. No  pneumothorax.       Impression:      1. Appropriate line support lines. Interval placement of an enteric  tube.  2. Mild improving perihilar opacities favoring improving but residual  edema. Small left pleural effusion. Stable mediastinal contours.  This report was finalized on 11/10/2022 07:42 by Dr. Kayla Fallon MD.    XR Abdomen KUB [[id number]] Collected: 11/10/22 0718     Updated: 11/10/22 0722    Narrative:      XR ABDOMEN KUB- 11/9/2022 10:08 PM CST     HISTORY: og placement; I46.9-Cardiac arrest, cause unspecified;  J81.0-Acute pulmonary edema     FINDINGS:  Feeding tube is seen with the tip located in the lower  gastric body     The visualized intestinal gas pattern is unremarkable without evidence  of obstruction.   Consolidation is seen in the retrocardiac space.  Cholecystectomy clips.          Impression:      Feeding tube tip located in the lower gastric body.     This report was finalized on 11/10/2022 07:18 by Dr Antonio Ahmadi, .    CT Angiogram Chest [[id number]] Collected: 11/09/22 1406     Updated: 11/09/22 1414    Narrative:      CT ANGIOGRAM CHEST- 11/9/2022 1:47 AM CST      HISTORY: Pulmonary embolism (PE) suspected, unknown D-dimer      COMPARISON: Chest x-ray dated 11/9/2022.      DOSE LENGTH PRODUCT: 247 mGy cm. Automated exposure control was also  utilized to decrease patient radiation dose.     TECHNIQUE: Helical tomographic images of the chest were obtained after  the administration of intravenous contrast following angiogram protocol.  Additionally, 3D and multiplanar reformatted images were provided.        FINDINGS:    Pulmonary arteries: There is adequate enhancement of the pulmonary  arteries to evaluate for central and segmental pulmonary emboli. There  are no filling defects within the main, lobar, segmental or visualized  subsegmental pulmonary arteries. The pulmonary arteries are within  normal limits for size.      Aorta and great vessels: Thoracic aorta is normal in caliber. No  dissection identified. No flow-limiting stenosis identified at the great  vessel origins.     Visualized neck base: The imaged portion of the base of the neck appears  unremarkable.      Lungs: Bilateral septal thickening and mixed groundglass and  consolidative dependent airspace opacities reflecting pulmonary edema.  There are bilateral small layering pleural effusions. Endotracheal tube  is present with tip approximately 4.2 cm above the lori. No  pneumothorax. Minimal secretions in the trachea. Airways are otherwise  clear.     Heart: Enlarged left ventricle. There is no pericardial effusion.      Mediastinum and lymph nodes: No suspicious hilar or mediastinal  adenopathy..     Skeletal and soft tissues: Chest wall soft  tissues are unremarkable. No  acute bony abnormality.       Upper abdomen: The imaged portion of the upper abdomen demonstrates no  acute process.        Impression:      1. No evidence of pulmonary embolus.  2. Enlarged left ventricle. Pulmonary edema with bilateral small  layering pleural effusions.  3. Endotracheal tube is in good position.        This report was finalized on 11/09/2022 14:11 by Dr Antonio Ahmadi, .    CT Head Without Contrast [[id number]] Collected: 11/09/22 1400     Updated: 11/09/22 1406    Narrative:      CT HEAD WO CONTRAST- 11/9/2022 1:47 AM CST     HISTORY: Neuro deficit, acute, stroke suspected       DOSE LENGTH PRODUCT: 1083 mGy cm. Automated exposure control was also  utilized to decrease patient radiation dose.     Technique:   Axial CT of the brain without IV contrast. Sagittal and coronal  reformations are also provided for review. Soft tissue and bone kernels  are available for interpretation.     Comparison: None.     Findings:      There is no evidence of acute large vascular territory infarct. No  intra-axial or extra-axial hemorrhage. No visualized mass lesion or mass  effect. The ventricles, cortical sulci and basal cisterns are symmetric  and age appropriate.  Posterior fossa structures are unremarkable. The  scalp and calvarium are intact. Visualized paranasal sinuses and  mastoids are clear.        Impression:      Impression:    1. No acute intracranial process.  This report was finalized on 11/09/2022 14:02 by Dr Antonio Ahmadi, .    XR Chest 1 View [[id number]] Collected: 11/09/22 1300     Updated: 11/09/22 1306    Narrative:      XR CHEST 1 VW- 11/9/2022 12:40 PM CST     HISTORY: CVC placement, ETT placement verification     COMPARISON: None.     FINDINGS:      Status post endotracheal intubation. Endotracheal tube is in good  position. There is a right IJ central venous catheter with tip  projecting over the right atrium. Cardiomegaly is present. There are  bilateral hazy  pulmonary infiltrates, central predominant, suggesting  pulmonary edema. Lungs are well expanded. No pleural effusion or  pneumothorax. Defibrillator pad is in place. No acute bony abnormality  seen.       Impression:      1. Endotracheal tube is in good position. Lungs are well expanded.  2. Bilateral central predominant hazy pulmonary infiltrates concerning  for pulmonary edema.  3. Central venous catheter is in good position.        This report was finalized on 11/09/2022 13:03 by Dr Antonio Ahmadi, .          Results for orders placed during the hospital encounter of 11/09/22    Adult Transthoracic Echo Complete W/ Cont if Necessary Per Protocol    Interpretation Summary  •  Left ventricular systolic function is severely decreased. Left ventricular ejection fraction appears to be 26 - 30%.  •  Right ventricle not well visualized but appears to have grossly normal size and systolic function.  •  Cardiac valves are poorly visualized, however no obvious abnormalities by Doppler assessment.      Assessment     1.  Status post cardiac arrest  2.  Idiopathic cardiomyopathy: LVEF 26 to 30%  3.  Elevated troponin: The patient's troponin was normal at the time of presentation.  Subsequently this was rechecked this morning and was noted to be elevated.  She is status post defibrillation.  4.  Concern for anoxic brain injury: MRI today.  There is concern for prolonged downtime prior to initiation of CPR.  5.  Diabetes mellitus  6.  Records indicate history of hepatitis C  7.  Previous thyroid cancer status post thyroidectomy with elevated TSH  8.  Obesity: BMI 33.98  9.  Tobacco abuse    Plan       Cardiology was consulted to evaluate this patient based on the findings of cardiomyopathy on her echocardiogram performed yesterday.  She is status postcardiopulmonary arrest.  She is currently sedated and intubated with plans for close monitoring to reevaluate her neurologic state.  She has MRI today.  There is concern for  anoxic brain injury.  Further cardiac work-up will be pending her neurologic recovery.  Cardiology will sign off, please recall if needed or with neurologic recovery and we would be more than happy to see her at that time.         Electronically signed by AIDEN Bellamy, 11/10/22, 9:59 AM CST.      Please note this cardiology consultation note is the result of a face to face consultation with the patient, in addition to reviewing medical records at length by myself, AIDEN Bellamy.       Time: 40 minutes

## 2022-11-10 NOTE — PROGRESS NOTES
"Pharmacy Recommendation  Antimicrobial Stewardship  Initiation     Assessment/Action/Plan:  Current Antimicrobials: Zosyn    Assessment / Recommendation: Received a \"Pharmacy to Dose\" consult for the indication of pneumonia. Initiated patient on Zosyn 4.5 g extended infusion (Q8H) protocol for a duration of 7 days. Pharmacy will continue routine follow-up evaluation and make any necessary adjustments.     Subjective:   Trina Ro is a 42 y.o. female currently being treated for pneumonia.    Objective:  Lab Results   Component Value Date    WBC 16.40 (H) 11/09/2022     Temp Readings from Last 1 Encounters:   11/09/22 97.9 °F (36.6 °C) (Axillary)     Culture Results:  Microbiology Results (last 10 days)       ** No results found for the last 240 hours. **          Current Antimicrobials:   Anti-Infectives (From admission, onward)      Ordered     Dose/Rate Route Frequency Start Stop    11/09/22 1830  piperacillin-tazobactam (ZOSYN) 4.5 g/100 mL 0.9% NS IVPB (mbp)        Ordering Provider: Carlin Moseley MD    4.5 g  over 4 Hours Intravenous Every 8 Hours 11/10/22 0400 11/17/22 0359    11/09/22 1830  piperacillin-tazobactam (ZOSYN) 4.5 g/100 mL 0.9% NS IVPB (mbp)        Ordering Provider: Carlin Moseley MD    4.5 g  over 30 Minutes Intravenous Once 11/09/22 2000 11/09/22 1823  Pharmacy to Dose Zosyn        Ordering Provider: Carlin Moseley MD     Does not apply Continuous PRN 11/09/22 1823 11/16/22 1822            Lizzy Berger, PharmREJI  11/09/22 18:31 CST     "

## 2022-11-10 NOTE — PROGRESS NOTES
1509 pt off vent and transported to MRI. 1610 returned from MRI and placed back on vent with previous settings. Pt was ventilated with via transport vent to, from, and during procedure. ETCO2 24-26 and sats 95-99 during transport and procedure.

## 2022-11-10 NOTE — PROGRESS NOTES
PULMONARY AND CRITICAL CARE PROGRESS NOTE - [facility]    Patient: Trina Ro    1980    MR# [id number]    Acct# [id number]  11/10/22   07:03 CST  Referring Provider: Usama Rodriguez*    Chief Complaint: Mechanically ventilated    Interval history: She is febrile this morning.  Creatinine worse at 1.0.  CK mildly elevated.  White count worse at 21.2.  X-ray showing persistent bilateral lung infiltrates.  EF 26.  ABG is adequate.  Saturation 95 on PEEP of 5 and FiO2 0.4.  Peak airway pressure 22.  Propofol ran out.  EEG in process at bedside. ?  Seizure activity.  Awaiting neurology has evaluation this morning.     Meds:  artificial tears, , Both Eyes, Q4H  budesonide, 0.5 mg, Nebulization, BID - RT  Chlorhexidine Gluconate Cloth, 1 application, Topical, Q24H  enoxaparin, 40 mg, Subcutaneous, Daily  famotidine, 20 mg, Intravenous, Q12H  ipratropium-albuterol, 3 mL, Nebulization, 4x Daily - RT  levETIRAcetam, 1,000 mg, Intravenous, Q12H  levothyroxine sodium, 88 mcg, Intravenous, Daily  methylPREDNISolone sodium succinate, 40 mg, Intravenous, Q8H  mupirocin, 1 application, Each Nare, BID  piperacillin-tazobactam, 4.5 g, Intravenous, Q8H  sodium chloride, 10 mL, Intravenous, Q12H      norepinephrine, 0.02-0.3 mcg/kg/min, Last Rate: Stopped (11/10/22 0100)  Pharmacy to Dose enoxaparin (LOVENOX),   Pharmacy to Dose Zosyn,   propofol, 5-50 mcg/kg/min, Last Rate: 50 mcg/kg/min (11/10/22 6286)      Review of Systems:   Cannot obtain due to mechanical ventilated state   Ventilator Settings:        Resp Rate (Set): 16     FiO2 (%): 40 %  PEEP/CPAP (cm H2O): 5 cm H20  Minute Ventilation (L/min) (Obs): 12 L/min  Resp Rate (Observed) Vent: 25     I:E Ratio (Obs): 1:1.8  PIP Observed (cm H2O): 21 cm H2O     Physical Exam:  Temp:  [97.8 °F (36.6 °C)-100.6 °F (38.1 °C)] 99.9 °F (37.7 °C)  Heart Rate:  [] 97  Resp:  [17-26] 26  BP: ()/(62-93) 103/72  Arterial Line BP: ()/(35-81)  101/69  FiO2 (%):  [40 %-80 %] 40 %  Intake/Output Summary (Last 24 hours) at 11/10/2022 0703  Last data filed at 11/10/2022 0502  Gross per 24 hour   Intake 1118.84 ml   Output 1850 ml   Net -731.16 ml     SpO2 Percentage    11/10/22 0620 11/10/22 0628 11/10/22 0630   SpO2: 96% 95% 95%   Body mass index is 33.98 kg/m².   Physical Exam   Constitutional:       General: She is intubated     Appearance: She is obese. She is ill-appearing. She is not toxic-appearing.   HENT:      Head: Normocephalic and atraumatic.  EEG leads     Comments: ETT/OG     Right Ear: External ear normal.      Left Ear: External ear normal.      Nose: Nose normal.   Eyes:      General:         Right eye: No discharge.         Left eye: No discharge.      Conjunctiva/sclera: Conjunctivae normal.   Neck:      Comments: Thick, triple lumen central line right neck  Cardiovascular:      Rate and Rhythm: Normal rate and regular rhythm.      Heart sounds: No murmur heard.  Pulmonary:      Effort: No tachypnea, accessory muscle usage or respiratory distress.      Breath sounds: Decreased air movement present.      Comments: Assisting the ventilator  Abdominal:      General: Abdomen is flat. Bowel sounds are normal. There is no distension.      Comments: obese   Genitourinary:     Comments: hardy  Musculoskeletal:         General: Normal range of motion.      Cervical back: Normal range of motion and neck supple.      Right lower leg: No edema.      Left lower leg: No edema.   Skin:     General: Skin is warm and dry.      Coloration: Skin is not jaundiced or pale.   Neurological:      Comments: intubated, propofol beeping, responsive    Electronically signed by AIDEN Tarango, 11/10/2022, 07:03 CST      Physician Substantive Portion: Medical Decision Making    Laboratory Data:  Results from last 7 days   Lab Units 11/10/22  0430 11/09/22  1213   WBC 10*3/mm3 21.24* 16.40*   HEMOGLOBIN g/dL 9.9* 9.8*   PLATELETS 10*3/mm3 279 247     Results from  last 7 days   Lab Units 11/10/22  0430 11/09/22  2315 11/09/22  1213   SODIUM mmol/L 135*  --  136   POTASSIUM mmol/L 3.6 3.6 3.1*   BUN mg/dL 15  --  8   CREATININE mg/dL 1.01*  --  0.88   INR   --   --  1.29*     Results from last 7 days   Lab Units 11/09/22  1455 11/09/22  1306   PH, ARTERIAL pH units 7.250* 7.162*   PCO2, ARTERIAL mm Hg 43.6 43.8   PO2 ART mm Hg 122.0* 66.0*   FIO2 % 80 65     No results found for: BLOODCX, URINECX, WOUNDCX, MRSACX, RESPCX, STOOLCX  Recent films:  Adult Transthoracic Echo Complete W/ Cont if Necessary Per Protocol    Result Date: 11/9/2022  •  Left ventricular systolic function is severely decreased. Left ventricular ejection fraction appears to be 26 - 30%. •  Right ventricle not well visualized but appears to have grossly normal size and systolic function. •  Cardiac valves are poorly visualized, however no obvious abnormalities by Doppler assessment.     CT Head Without Contrast    Result Date: 11/9/2022  CT HEAD WO CONTRAST- 11/9/2022 1:47 AM CST  HISTORY: Neuro deficit, acute, stroke suspected   DOSE LENGTH PRODUCT: 1083 mGy cm. Automated exposure control was also utilized to decrease patient radiation dose.  Technique: Axial CT of the brain without IV contrast. Sagittal and coronal reformations are also provided for review. Soft tissue and bone kernels are available for interpretation.  Comparison: None.  Findings:  There is no evidence of acute large vascular territory infarct. No intra-axial or extra-axial hemorrhage. No visualized mass lesion or mass effect. The ventricles, cortical sulci and basal cisterns are symmetric and age appropriate.  Posterior fossa structures are unremarkable. The scalp and calvarium are intact. Visualized paranasal sinuses and mastoids are clear.      Impression: Impression:  1. No acute intracranial process. This report was finalized on 11/09/2022 14:02 by Dr Antonio Ahmadi, .    XR Chest 1 View    Result Date: 11/9/2022  XR CHEST 1 VW-  11/9/2022 12:40 PM CST  HISTORY: CVC placement, ETT placement verification  COMPARISON: None.  FINDINGS:  Status post endotracheal intubation. Endotracheal tube is in good position. There is a right IJ central venous catheter with tip projecting over the right atrium. Cardiomegaly is present. There are bilateral hazy pulmonary infiltrates, central predominant, suggesting pulmonary edema. Lungs are well expanded. No pleural effusion or pneumothorax. Defibrillator pad is in place. No acute bony abnormality seen.      Impression: 1. Endotracheal tube is in good position. Lungs are well expanded. 2. Bilateral central predominant hazy pulmonary infiltrates concerning for pulmonary edema. 3. Central venous catheter is in good position.   This report was finalized on 11/09/2022 13:03 by Dr Antonio Ahmadi, .    CT Angiogram Chest    Result Date: 11/9/2022  CT ANGIOGRAM CHEST- 11/9/2022 1:47 AM CST  HISTORY: Pulmonary embolism (PE) suspected, unknown D-dimer  COMPARISON: Chest x-ray dated 11/9/2022.  DOSE LENGTH PRODUCT: 247 mGy cm. Automated exposure control was also utilized to decrease patient radiation dose.  TECHNIQUE: Helical tomographic images of the chest were obtained after the administration of intravenous contrast following angiogram protocol. Additionally, 3D and multiplanar reformatted images were provided.    FINDINGS:  Pulmonary arteries: There is adequate enhancement of the pulmonary arteries to evaluate for central and segmental pulmonary emboli. There are no filling defects within the main, lobar, segmental or visualized subsegmental pulmonary arteries. The pulmonary arteries are within normal limits for size.  Aorta and great vessels: Thoracic aorta is normal in caliber. No dissection identified. No flow-limiting stenosis identified at the great vessel origins.  Visualized neck base: The imaged portion of the base of the neck appears unremarkable.  Lungs: Bilateral septal thickening and mixed groundglass  and consolidative dependent airspace opacities reflecting pulmonary edema. There are bilateral small layering pleural effusions. Endotracheal tube is present with tip approximately 4.2 cm above the lori. No pneumothorax. Minimal secretions in the trachea. Airways are otherwise clear.  Heart: Enlarged left ventricle. There is no pericardial effusion.  Mediastinum and lymph nodes: No suspicious hilar or mediastinal adenopathy..  Skeletal and soft tissues: Chest wall soft tissues are unremarkable. No acute bony abnormality.   Upper abdomen: The imaged portion of the upper abdomen demonstrates no acute process.      Impression: 1. No evidence of pulmonary embolus. 2. Enlarged left ventricle. Pulmonary edema with bilateral small layering pleural effusions. 3. Endotracheal tube is in good position.   This report was finalized on 11/09/2022 14:11 by Dr Antonio Ahmadi, .     My radiograph interpretation/independent review of imaging: Reviewed and agree with current interpretation    Pulmonary Assessment:  1. Acute hypoxic respiratory failure  2. Oral intubation and mechanical ventilation  3. Out-of-hospital cardiac arrest s/p CPR multiple defibrillator shocks  4. Dilated and ischemic cardiomyopathy with low ejection fraction  5. Acute on chronic congestive systolic and diastolic heart failure  6. Seizure activity possible anoxic encephalopathy  7. History of seizure activity in the past  8. Hypertension  9. Diabetes mellitus type 2  10. History of thyroid cancer and hypothyroidism  11. Tobacco abuse/possible COPD  12. Anxiety and depression  13. Hepatitis C positive with transaminitis  14. Allergic rhinitis    Recommend/plan:   · Patient was seen in follow-up visit in cardiac care unit today.  · Patient was febrile last night.  Leukocytosis worse.  · Antibiotics reviewed.  I started her on Zosyn and add vancomycin   · Blood culture sent and will be followed and antibiotics will be adjusted  · Patient EEG done.  Neurology  following.  She had seizures and likely had anoxic encephalopathy  · Continue anticonvulsant medications.  MRI also confirmed anoxic brain injury.  · Continue bronchodilator and routine respiratory care pulmonary toilet.  · Patient is not ready for ventilator weaning and will be continued on full mechanical ventilation  · Current ventilator settings are assist-control/volume control ventilation  · Tidal volume 500, rate 16, PEEP of 5 and FiO2 30%, oxygen saturation 94%  · DVT and stress ulcer problems and pain and anxiety control.  · Start nutritional support with the tube feeding because patient is unlikely to be extubated soon  · Monitor neurologic status and further recommendations per neurology.  · She is on Lasix.  Monitor renal function and lites.  She has dilated and ischemic cardiomyopathy and low ejection fraction  · Blood pressure and glycemic control needs to be monitored.  Repeat labs and imaging studies.  Monitor leukocytosis.  · Renal function slightly worse and needs to be monitored  · CODE STATUS: Full.  Overall prognosis guarded  · We will follow.    This visit was performed by both a physician and an [facility] RN.  I personally evaluated and examined the patient.  I performed all aspects of the medical decision making as documented.    Electronically signed by     Carlin Moseley MD,  Pulmonologist/Intensivist   11/10/2022, 17:35 CST

## 2022-11-10 NOTE — SIGNIFICANT NOTE
11/10/22 0619   Readings   PEEP Intrinsic (cm H2O) 4.9 cm H2O   Plateau Pressure (cm H2O) 19 cm H2O   Driving Pressure (cm H2O) 14.2 cm H2O   Static Compliance (L/cm H2O) 34   Dynamic Compliance (L/cm H2O) 39 L/cm H2O

## 2022-11-11 NOTE — PROGRESS NOTES
RT EQUIPMENT DEVICE RELATED - SKIN ASSESSMENT    RT Medical Equipment/Device:     ETT Robles/Anchorfast    Skin Assessment:      Cheek:  Intact  Neck:  Intact    Device Skin Pressure Protection:  Skin-to-device areas padded:  Anchorfast

## 2022-11-11 NOTE — PROGRESS NOTES
1           [facility] Medicine Services  INPATIENT PROGRESS NOTE    Patient Name: Trina Ro  Date of Admission: 11/9/2022  Today's Date: 11/11/22  Length of Stay: 2  Primary Care Physician: Franco Pascual MD    Subjective   Chief Complaint: Follow-up  HPI   Called overhead as patient is in V. tach.  She got defibrillation at 200 J per Dr. Benitez  Patient back in sinus rhythm  Spoke with Dr. Weber over the phone.  Starting patient on amiodarone    Reviewed information including MRI of brain, and most recent laboratory.  Chest x-ray.  I met with family at separate occasion    Review of Systems     All pertinent negatives and positives are as above. All other systems have been reviewed and are negative unless otherwise stated.     Objective    Temp:  [97.4 °F (36.3 °C)-98.6 °F (37 °C)] 98.4 °F (36.9 °C)  Heart Rate:  [] 104  Resp:  [17-30] 30  BP: (102-138)/(67-83) 113/75  Arterial Line BP: ()/(65-71) 99/65  FiO2 (%):  [30 %-40 %] 30 %  Physical Exam  Laying flat in bed  Intubated  Sedated on propofol at 50  Mechanically ventilated with settings of assist-control, respiratory rate 16, FiO2 30 and PEEP of 5  O2 saturation is 97%  Blood pressure is in the 120s  Sinus rhythm on telemetry rate of 96  Pupils from my view appears to be dilated.  I could not tell whether it was reactive to light.  Adequate air exchange, no gross crackles or wheezes  S1-S2, regular rate and rhythm, no gross murmur  Abdomen soft, nontender, no organomegaly  No cyanosis, no edema  Warm dry skin  Flat affect        Results Review:  I have reviewed the labs, radiology results, and diagnostic studies.    Laboratory Data:   Results from last 7 days   Lab Units 11/11/22  0245 11/10/22  0430 11/09/22  1213   WBC 10*3/mm3 27.92* 21.24* 16.40*   HEMOGLOBIN g/dL 9.9* 9.9* 9.8*   HEMATOCRIT % 33.1* 32.4* 34.1   PLATELETS 10*3/mm3 259 279 247        Results from last 7 days   Lab Units  11/11/22  0246 11/10/22  0430 11/09/22  2315 11/09/22  1213   SODIUM mmol/L 137 135*  --  136   POTASSIUM mmol/L 3.5 3.6 3.6 3.1*   CHLORIDE mmol/L 103 104  --  102   CO2 mmol/L 21.0* 19.0*  --  16.0*   BUN mg/dL 16 15  --  8   CREATININE mg/dL 1.07* 1.01*  --  0.88   CALCIUM mg/dL 7.9* 7.9*  --  8.3*   BILIRUBIN mg/dL  --  0.3  --  0.3   ALK PHOS U/L  --  69  --  79   ALT (SGPT) U/L  --  14  --  7   AST (SGOT) U/L  --  30  --  17   GLUCOSE mg/dL 224* 220*  --  256*       Culture Data:   Blood Culture   Date Value Ref Range Status   11/09/2022 No growth at 24 hours  Preliminary   11/09/2022 No growth at 24 hours  Preliminary     Respiratory Culture   Date Value Ref Range Status   11/09/2022 Culture in progress  Preliminary       Radiology Data:   Imaging Results (Last 24 Hours)     Procedure Component Value Units Date/Time    XR Chest 1 View [[id number]] Collected: 11/11/22 0626     Updated: 11/11/22 0630    Narrative:      EXAMINATION: XR CHEST 1 VW-     11/11/2022 3:30 AM CST     HISTORY: on vent, bilateral lung infiltrates; I46.9-Cardiac arrest,  cause unspecified; J81.0-Acute pulmonary edema     A frontal projection of the chest is compared with the previous study  dated 11/10/2022.     Bilateral lung opacities representing pulmonary edema persist. It  appears more pronounced on the right side, similar to the previous  study.     There is left lower lung consolidation similar to the previous study.  There is probable left basal pleural effusion. No change.     There is no pneumothorax.     There is moderate cardiomegaly.     No acute bony abnormality.     Endotracheal tube, nasogastric tube and right internal jugular venous  access lines are in place.       Impression:      1. No change.  This report was finalized on 11/11/2022 06:27 by Dr. Khloe Hughes MD.    MRI Brain With & Without Contrast [[id number]] Collected: 11/10/22 1622     Updated: 11/10/22 1634    Narrative:      Indication: Altered mental status         Technique: Multisequence, multiplanar MRI of the brain with and without  contrast. 20 cc MultiHance IV contrast.     Comparison: CT scan dated 11/9/2022     Findings:      Extensive diffusion signal abnormality on this exam. This includes a  diffuse abnormal cortical restricted diffusion as well as bilateral  symmetric restricted diffusion in the caudates and putamina. Associated  FLAIR abnormality in these areas. There does not appear to be a  significant edema at this time and the cortical sulci are not effaced.  Lateral and third ventricles are not effaced. Basal cisterns are patent.  Posterior fossa structures are unremarkable. Central arterial flow voids  are well preserved. There is no acute intra-axial or extra-axial  hemorrhage. Orbital contents are unremarkable. There is fluid layering  in the nasopharynx.       Impression:      Impression:     1. Bilateral symmetric abnormal cortical diffusion restriction as well  as symmetric diffusion signal abnormality in the caudates and putamina,  appearance suggesting hypoxic ischemic encephalopathy.  2. No significant edema/mass effect at this time.     This report was finalized on 11/10/2022 16:31 by Dr Antonio Ahmadi, .          I have reviewed the patient's current medications.     Assessment/Plan     Active Hospital Problems    Diagnosis    • **Cardiac arrest (HCC)      Problem list     • seizure from discussion with Dr. Liang -related to hypoxic ischemic encephalopathy  • Hypoxic ischemic encephalopathy  • Status post cardiorespiratory resuscitation.  Reportedly PEA.  Initially placed on code chill protocol.  After reviewing the inclusion and exclusion criteria, patient believed to have an exclusion criteria for code chill (suspected seizures/status epilepticus, initiation of BLS greater than 10 minutes); no significant elevation of troponin despite multiple defibrillation  • Cardiomyopathy/pulmonary edema acute EF 26 to 30%  • Acute respiratory failure  requiring mechanical ventilation  • Elevated BNP - cardiomyopathy - etiology unclear  • Shock (etiology unclear) -off Levophed  • Hypokalemia replaced   • ? Hx of Hep c base on med from home ([place])  • Elevated thyroid stimulating hormone - ? Hx of thyroid cancer with thyroidectomy - on supplemental HRT. Changed to iv dose ~ half of normal PO dose  • ? Medical compliance  • Lactic acidosis (resolved) from seizure, respiratory failure -   • Hyperglycemia - ? Hx of DM  • Folic acid deficiency  • Leukocytosis  Plan:  Continuous EEG discontinued yesterday.  Findings are seen from patients with hypoxic ischemic encephalopathy.  Continuing Kera  Discussed with Dr. Liang yesterday  MRI of brain as findings suggesting hypoxic ischemic encephalopathy.  Prognosis guarded as per Dr. Dr. Liang  Discussed with cardiology yesterday (Lidia Kraus).  EF low, status post cardiopulmonary resuscitation; flat trend of troponin  Continue present management.    artificial tears, , Both Eyes, Q4H  budesonide, 0.5 mg, Nebulization, BID - RT  Chlorhexidine Gluconate Cloth, 1 application, Topical, Q24H  enoxaparin, 40 mg, Subcutaneous, Daily  famotidine, 20 mg, Intravenous, Q12H  furosemide, 40 mg, Intravenous, Q12H  ipratropium-albuterol, 3 mL, Nebulization, 4x Daily - RT  levETIRAcetam, 1,000 mg, Intravenous, Q12H  levothyroxine sodium, 88 mcg, Intravenous, Daily  methylPREDNISolone sodium succinate, 40 mg, Intravenous, Q12H  mupirocin, 1 application, Each Nare, BID  piperacillin-tazobactam, 4.5 g, Intravenous, Q8H  sodium chloride, 10 mL, Intravenous, Q12H  vancomycin, 1,000 mg, Intravenous, Q12H              Discharge Planning: To be determined    Electronically signed by Usama Rodriguez MD, 11/11/22, 08:39 CST.

## 2022-11-11 NOTE — NURSING NOTE
"Per Dr. Rodriguez's note at 0846, family decided to make pt no CPR/DNR. While we were in this meeting, staff had to shock pt again at 200J for vtach. When this nurse returned to the bedside at 0849 pt converted to vtach in the 200's again. When this nurse notified the family that the pt was in a lethal rhythm and that they had agreed to let her pass, they asked that we shock her again and \"keep her alive until the neurologist arrives.\" We shocked again at 200J and then again at 0850 at 200J. Pt converted to sinus tach both times. Dr. Liang paged and enroute.  "

## 2022-11-11 NOTE — PROGRESS NOTES
"Pharmacy Dosing Service  Pharmacokinetics  Vancomycin Follow-up Evaluation    Assessment/Action/Plan:  Current Order: Vancomycin 1250 mg IVPB every 12 hours  Current end date:11-17-22  Levels: None  Additional antimicrobial agent(s): Zosyn    Vancomycin initiated last PM for \"sepsis\". Scr=1.07 (increasing; see below). Patient on Zosyn as well. Adjusted Vancomycin dose to 1000mg IV Q12h. Trough already on order prior to the AM dose tomorrow (4th). Pharmacy will continue to follow daily and adjust dose accordingly.     Subjective:  Trina Ro is a 42 y.o. female currently on Vancomycin 1250 mg IV every 12 hours for the treatment of \"sepsis\", day 2 of 7 of treatment.    AUC Model Data:  Regimen: 1000 mg IV every 12 hours  Exposure target: AUC24 (range) 400-600 mg/L.hr   AUC24,ss: 528 mg/L.hr  PAUC*: 88 %  Ctrough,ss: 16.1 mg/L  Pconc*: 24 %  Tox.: 11 %    Objective:  Ht: 167.6 cm (66\"); Wt: 95.5 kg (210 lb 8 oz)  Estimated Creatinine Clearance: 78.2 mL/min (A) (by C-G formula based on SCr of 1.07 mg/dL (H)).     Creatinine   Date Value Ref Range Status   11/11/2022 1.07 (H) 0.57 - 1.00 mg/dL Final   11/10/2022 1.01 (H) 0.57 - 1.00 mg/dL Final   11/09/2022 0.88 0.57 - 1.00 mg/dL Final      Lab Results   Component Value Date    WBC 27.92 (H) 11/11/2022    WBC 21.24 (H) 11/10/2022    WBC 16.40 (H) 11/09/2022        Culture Results:  Microbiology Results (last 10 days)       Procedure Component Value - Date/Time    Respiratory Culture - Sputum, Bronchus [[id number]] Collected: 11/09/22 6684    Lab Status: Preliminary result Specimen: Sputum from Bronchus Updated: 11/10/22 1238     Respiratory Culture Culture in progress     Gram Stain Moderate (3+) WBCs seen      Moderate (3+) Mixed gram positive johnny    Blood Culture - Blood, Hand, Right [[id number]]  (Normal) Collected: 11/09/22 2129    Lab Status: Preliminary result Specimen: Blood from Hand, Right Updated: 11/10/22 2216     Blood Culture No growth at 24 hours    " Blood Culture - Blood, Arm, Right [[id number]]  (Normal) Collected: 11/09/22 1213    Lab Status: Preliminary result Specimen: Blood from Arm, Right Updated: 11/10/22 1331     Blood Culture No growth at 24 hours            Adrien Johnson, PharmD   11/11/22 07:51 CST

## 2022-11-11 NOTE — PROGRESS NOTES
"Pharmacy Dosing Service  Pharmacokinetics  Vancomycin Initial Evaluation  Assessment/Action/Plan:  Loading dose?: N/A  Current Order: Vancomycin 1250 mg IVPB every 12 hours  Current end date: 11/17/2022  Levels: Obtain Vancomycin trough prior to dose on 11/12 0600 am  Additional antimicrobial agent(s): Zosyn    Vancomycin dosage initiated based on population pharmacokinetic parameters. Pharmacy will continue to follow daily and adjust dose accordingly.     Subjective:  Trina Ro is a 42 y.o. female with a Vancomycin \"Pharmacy to Dose\" consult for the treatment of Sepsis , day 1 of 7 of treatment.    AUC Model Data:  Loading dose: N/A  Regimen: 1250 mg IV every 12 hours.  Start time: 19:00 on 11/10/2022  Exposure target: AUC24 (range) 400-600 mg/L.hr   AUC24,ss: 597 mg/L.hr  PAUC*: 96 %  Ctrough,ss: 17.9 mg/L  Pconc*: 36 %  Tox.: 14 %      Objective:  Ht: 167.6 cm (66\"); Wt: 95.5 kg (210 lb 8 oz)  Estimated Creatinine Clearance: 82.8 mL/min (A) (by C-G formula based on SCr of 1.01 mg/dL (H)).   Creatinine   Date Value Ref Range Status   11/10/2022 1.01 (H) 0.57 - 1.00 mg/dL Final   11/09/2022 0.88 0.57 - 1.00 mg/dL Final   07/22/2020 0.81 0.57 - 1.00 mg/dL Final      Lab Results   Component Value Date    WBC 21.24 (H) 11/10/2022    WBC 16.40 (H) 11/09/2022    WBC 10.05 07/22/2020      Baseline culture results:  Microbiology Results (last 10 days)       Procedure Component Value - Date/Time    Respiratory Culture - Sputum, Bronchus [[id number]] Collected: 11/09/22 1359    Lab Status: Preliminary result Specimen: Sputum from Bronchus Updated: 11/10/22 1238     Respiratory Culture Culture in progress     Gram Stain Moderate (3+) WBCs seen      Moderate (3+) Mixed gram positive johnny    Blood Culture - Blood, Arm, Right [[id number]]  (Normal) Collected: 11/09/22 1213    Lab Status: Preliminary result Specimen: Blood from Arm, Right Updated: 11/10/22 1331     Blood Culture No growth at 24 hours            Gladis TSAI " Radhika Aguila  11/10/22 18:04 CST

## 2022-11-11 NOTE — PROGRESS NOTES
RT EQUIPMENT DEVICE RELATED - SKIN ASSESSMENT    RT Medical Equipment/Device:     ETT Robles/Anchorfast    Skin Assessment:      Cheek:  Intact    Device Skin Pressure Protection:  Skin-to-device areas padded:  Anchorfast

## 2022-11-11 NOTE — DISCHARGE SUMMARY
Family finally decided to palliatively extubate their relative.   She passed away at 0959-hour peacefully.  Patient admitted status post cardiopulmonary resuscitation.  Patient reportedly had multiple defibrillation done  By the time she arrived in the emergency room she was reportedly PEA arrest.  Epinephrine was given x1.  She got initially code chill however after reviewing the inclusion and exclusion criteria, she was disqualified because of exclusion criteria.  Patient was seen in consult by neurology.  She was started on Keppra.  Pulmonary assisted and ventilator management.  Cardiology seen patient in consultation because of cardiomyopathy with EF of 26 to 30%.  Etiology unclear  Ultimately, patient's relatives decided to pursue comfort measures only after an extensive discussion of condition, management option and prognosis.       Discharge diagnoses   Status post cardiopulmonary resuscitation  Hypoxic ischemic encephalopathy  Seizure related to HIE  Cardiomyopathy/pulmonary edema  Acute hypoxic respiratory failure  Elevated BNP secondary to cardiomyopathy  Shock  Hypokalemia  History of Hepatitis C  Lactic acidosis  History of thyroid cancer with prior thyroidectomy  Hyperglycemia  Folic acid deficiency  Leukocytosis possibly reactive; cannot totally rule out presence of infectious process

## 2022-11-11 NOTE — SIGNIFICANT NOTE
11/11/22 0637   Readings   PEEP Intrinsic (cm H2O) 5 cm H2O   Plateau Pressure (cm H2O) 21 cm H2O   Static Compliance (L/cm H2O) 31

## 2022-11-11 NOTE — PROGRESS NOTES
LOS: 2 days   Patient Care Team:  Franco Pascual MD as PCP - General (Family Medicine)  Aleena Trinidad MA (Inactive) as Medical Assistant    Chief Complaint: Cardiopulmonary arrest     Dev Ro is a 42 y.o. female who is being seen in follow-up  Overnight has had seizure-like activity  Continues to be intubated mechanically ventilated  Maintaining sinus rhythm  Labs as referenced below  Nursing by bedside  Patient is not responsive  Multiple specialties have seen the patient including neurology  No family member by bedside      Telemetry: no malignant arrhythmia. No significant pauses.    Unable to perform as intubated  Cannot obtain due to mechanical ventilation.  The patient notably is critically ill and connected to a ventilator.  As such patient cannot communicate and provide any history whatsoever, including any history of present illness or interval history since arrival or review of systems. The interested reviewer may note this fact, as an attempt has been made at collecting and documenting these portions of the patient history, but this information is unobtainable despite attempted review and therefore cannot be documented at this time.    History:   Past Medical History:   Diagnosis Date   • Anxiety state    • Cancer (HCC)     THYROID   • Endogenous obesity    • History of malignant neoplasm of thyroid    • Postoperative hypothyroidism    • Type 2 diabetes mellitus (HCC)    • Vitamin D deficiency      Past Surgical History:   Procedure Laterality Date   • CHOLECYSTECTOMY     • THYROIDECTOMY     • TONSILLECTOMY       Social History     Socioeconomic History   • Marital status:    Tobacco Use   • Smoking status: Every Day     Types: Cigarettes   • Smokeless tobacco: Never   • Tobacco comments:     smoking cessation encouraged    Substance and Sexual Activity   • Alcohol use: No   • Drug use: No     Family History   Problem Relation Age of Onset   •  Hypertension Other    • Thyroid disease Other    • Diabetes Other    • Heart disease Other    • Stroke Other    • Breast cancer Other    • Cholelithiasis Other    • Kidney disease Other    • Pancreatic cancer Other    • Prostate cancer Father    • Stroke Paternal Grandmother        Labs:  WBC WBC   Date Value Ref Range Status   11/11/2022 27.92 (H) 3.40 - 10.80 10*3/mm3 Final   11/10/2022 21.24 (H) 3.40 - 10.80 10*3/mm3 Final   11/09/2022 16.40 (H) 3.40 - 10.80 10*3/mm3 Final      HGB Hemoglobin   Date Value Ref Range Status   11/11/2022 9.9 (L) 12.0 - 15.9 g/dL Final   11/10/2022 9.9 (L) 12.0 - 15.9 g/dL Final   11/09/2022 9.8 (L) 12.0 - 15.9 g/dL Final      HCT Hematocrit   Date Value Ref Range Status   11/11/2022 33.1 (L) 34.0 - 46.6 % Final   11/10/2022 32.4 (L) 34.0 - 46.6 % Final   11/09/2022 34.1 34.0 - 46.6 % Final      Platelets Platelets   Date Value Ref Range Status   11/11/2022 259 140 - 450 10*3/mm3 Final   11/10/2022 279 140 - 450 10*3/mm3 Final   11/09/2022 247 140 - 450 10*3/mm3 Final      MCV MCV   Date Value Ref Range Status   11/11/2022 81.3 79.0 - 97.0 fL Final   11/10/2022 79.2 79.0 - 97.0 fL Final   11/09/2022 85.3 79.0 - 97.0 fL Final        Results from last 7 days   Lab Units 11/11/22  0246 11/10/22  0430 11/09/22  2315 11/09/22  1213   SODIUM mmol/L 137 135*  --  136   POTASSIUM mmol/L 3.5 3.6 3.6 3.1*   CHLORIDE mmol/L 103 104  --  102   CO2 mmol/L 21.0* 19.0*  --  16.0*   BUN mg/dL 16 15  --  8   CREATININE mg/dL 1.07* 1.01*  --  0.88   CALCIUM mg/dL 7.9* 7.9*  --  8.3*   BILIRUBIN mg/dL  --  0.3  --  0.3   ALK PHOS U/L  --  69  --  79   ALT (SGPT) U/L  --  14  --  7   AST (SGOT) U/L  --  30  --  17   GLUCOSE mg/dL 224* 220*  --  256*     Lab Results   Component Value Date    CKTOTAL 302 (H) 11/10/2022    TROPONINT 0.283 (C) 11/10/2022     PT/INR:    Protime   Date Value Ref Range Status   11/09/2022 15.6 (H) 11.9 - 14.6 Seconds Final   /  INR   Date Value Ref Range Status   11/09/2022  1.29 (H) 0.91 - 1.09 Final       Imaging Results (Last 72 Hours)     Procedure Component Value Units Date/Time    XR Chest 1 View [[id number]] Collected: 11/11/22 0626     Updated: 11/11/22 0630    Narrative:      EXAMINATION: XR CHEST 1 VW-     11/11/2022 3:30 AM CST     HISTORY: on vent, bilateral lung infiltrates; I46.9-Cardiac arrest,  cause unspecified; J81.0-Acute pulmonary edema     A frontal projection of the chest is compared with the previous study  dated 11/10/2022.     Bilateral lung opacities representing pulmonary edema persist. It  appears more pronounced on the right side, similar to the previous  study.     There is left lower lung consolidation similar to the previous study.  There is probable left basal pleural effusion. No change.     There is no pneumothorax.     There is moderate cardiomegaly.     No acute bony abnormality.     Endotracheal tube, nasogastric tube and right internal jugular venous  access lines are in place.       Impression:      1. No change.  This report was finalized on 11/11/2022 06:27 by Dr. Khloe Hughes MD.    MRI Brain With & Without Contrast [[id number]] Collected: 11/10/22 1622     Updated: 11/10/22 1634    Narrative:      Indication: Altered mental status        Technique: Multisequence, multiplanar MRI of the brain with and without  contrast. 20 cc MultiHance IV contrast.     Comparison: CT scan dated 11/9/2022     Findings:      Extensive diffusion signal abnormality on this exam. This includes a  diffuse abnormal cortical restricted diffusion as well as bilateral  symmetric restricted diffusion in the caudates and putamina. Associated  FLAIR abnormality in these areas. There does not appear to be a  significant edema at this time and the cortical sulci are not effaced.  Lateral and third ventricles are not effaced. Basal cisterns are patent.  Posterior fossa structures are unremarkable. Central arterial flow voids  are well preserved. There is no acute intra-axial  or extra-axial  hemorrhage. Orbital contents are unremarkable. There is fluid layering  in the nasopharynx.       Impression:      Impression:     1. Bilateral symmetric abnormal cortical diffusion restriction as well  as symmetric diffusion signal abnormality in the caudates and putamina,  appearance suggesting hypoxic ischemic encephalopathy.  2. No significant edema/mass effect at this time.     This report was finalized on 11/10/2022 16:31 by Dr Antonio Ahmadi, .    XR Chest 1 View [[id number]] Collected: 11/10/22 0740     Updated: 11/10/22 0745    Narrative:      HISTORY: Intubated, lung infiltrates     CXR: A frontal view the chest obtained. Patient rotated to the left.     COMPARISON: 11/9/2022     FINDINGS: Endotracheal tube tip appropriate in position at the T2/T3  level. Enteric tube descends into the abdomen. Right IJ central line in  place with tip overlying the SVC.     Stable mild cardiomegaly. Small left pleural effusion. Perihilar  densities favoring mild edema, improved from yesterday's exam. No  pneumothorax.       Impression:      1. Appropriate line support lines. Interval placement of an enteric  tube.  2. Mild improving perihilar opacities favoring improving but residual  edema. Small left pleural effusion. Stable mediastinal contours.  This report was finalized on 11/10/2022 07:42 by Dr. Kayla Fallon MD.    XR Abdomen KUB [[id number]] Collected: 11/10/22 0718     Updated: 11/10/22 0722    Narrative:      XR ABDOMEN KUB- 11/9/2022 10:08 PM CST     HISTORY: og placement; I46.9-Cardiac arrest, cause unspecified;  J81.0-Acute pulmonary edema     FINDINGS:  Feeding tube is seen with the tip located in the lower  gastric body     The visualized intestinal gas pattern is unremarkable without evidence  of obstruction.  Consolidation is seen in the retrocardiac space.  Cholecystectomy clips.          Impression:      Feeding tube tip located in the lower gastric body.     This report was finalized on  11/10/2022 07:18 by Dr Antonio Ahmadi, .    CT Angiogram Chest [[id number]] Collected: 11/09/22 1406     Updated: 11/09/22 1414    Narrative:      CT ANGIOGRAM CHEST- 11/9/2022 1:47 AM CST      HISTORY: Pulmonary embolism (PE) suspected, unknown D-dimer      COMPARISON: Chest x-ray dated 11/9/2022.      DOSE LENGTH PRODUCT: 247 mGy cm. Automated exposure control was also  utilized to decrease patient radiation dose.     TECHNIQUE: Helical tomographic images of the chest were obtained after  the administration of intravenous contrast following angiogram protocol.  Additionally, 3D and multiplanar reformatted images were provided.        FINDINGS:    Pulmonary arteries: There is adequate enhancement of the pulmonary  arteries to evaluate for central and segmental pulmonary emboli. There  are no filling defects within the main, lobar, segmental or visualized  subsegmental pulmonary arteries. The pulmonary arteries are within  normal limits for size.      Aorta and great vessels: Thoracic aorta is normal in caliber. No  dissection identified. No flow-limiting stenosis identified at the great  vessel origins.     Visualized neck base: The imaged portion of the base of the neck appears  unremarkable.      Lungs: Bilateral septal thickening and mixed groundglass and  consolidative dependent airspace opacities reflecting pulmonary edema.  There are bilateral small layering pleural effusions. Endotracheal tube  is present with tip approximately 4.2 cm above the lori. No  pneumothorax. Minimal secretions in the trachea. Airways are otherwise  clear.     Heart: Enlarged left ventricle. There is no pericardial effusion.      Mediastinum and lymph nodes: No suspicious hilar or mediastinal  adenopathy..     Skeletal and soft tissues: Chest wall soft tissues are unremarkable. No  acute bony abnormality.       Upper abdomen: The imaged portion of the upper abdomen demonstrates no  acute process.        Impression:      1. No  evidence of pulmonary embolus.  2. Enlarged left ventricle. Pulmonary edema with bilateral small  layering pleural effusions.  3. Endotracheal tube is in good position.        This report was finalized on 11/09/2022 14:11 by Dr Antonio Ahmadi, .    CT Head Without Contrast [[id number]] Collected: 11/09/22 1400     Updated: 11/09/22 1406    Narrative:      CT HEAD WO CONTRAST- 11/9/2022 1:47 AM CST     HISTORY: Neuro deficit, acute, stroke suspected       DOSE LENGTH PRODUCT: 1083 mGy cm. Automated exposure control was also  utilized to decrease patient radiation dose.     Technique:   Axial CT of the brain without IV contrast. Sagittal and coronal  reformations are also provided for review. Soft tissue and bone kernels  are available for interpretation.     Comparison: None.     Findings:      There is no evidence of acute large vascular territory infarct. No  intra-axial or extra-axial hemorrhage. No visualized mass lesion or mass  effect. The ventricles, cortical sulci and basal cisterns are symmetric  and age appropriate.  Posterior fossa structures are unremarkable. The  scalp and calvarium are intact. Visualized paranasal sinuses and  mastoids are clear.        Impression:      Impression:    1. No acute intracranial process.  This report was finalized on 11/09/2022 14:02 by Dr Antonio Ahmadi, .    XR Chest 1 View [[id number]] Collected: 11/09/22 1300     Updated: 11/09/22 1306    Narrative:      XR CHEST 1 VW- 11/9/2022 12:40 PM CST     HISTORY: CVC placement, ETT placement verification     COMPARISON: None.     FINDINGS:      Status post endotracheal intubation. Endotracheal tube is in good  position. There is a right IJ central venous catheter with tip  projecting over the right atrium. Cardiomegaly is present. There are  bilateral hazy pulmonary infiltrates, central predominant, suggesting  pulmonary edema. Lungs are well expanded. No pleural effusion or  pneumothorax. Defibrillator pad is in place. No acute  bony abnormality  seen.       Impression:      1. Endotracheal tube is in good position. Lungs are well expanded.  2. Bilateral central predominant hazy pulmonary infiltrates concerning  for pulmonary edema.  3. Central venous catheter is in good position.        This report was finalized on 11/09/2022 13:03 by Dr Antonio hAmadi, .          Objective     No Known Allergies    Medication Review: Performed  Current Facility-Administered Medications   Medication Dose Route Frequency Provider Last Rate Last Admin   • acetaminophen (TYLENOL) suppository 650 mg  650 mg Rectal Q4H PRN Usama Rodriguez MD       • artificial tears ophthalmic ointment   Both Eyes Q4H Usama Rodriguez MD   Given at 11/11/22 0340   • budesonide (PULMICORT) nebulizer solution 0.5 mg  0.5 mg Nebulization BID - RT Carlin Moseley MD   0.5 mg at 11/11/22 0639   • Chlorhexidine Gluconate Cloth 2 % pads 1 application  1 application Topical Q24H Usama Rodriguez MD   1 application at 11/11/22 0340   • Enoxaparin Sodium (LOVENOX) syringe 40 mg  40 mg Subcutaneous Daily Shani Mendiola APRN   40 mg at 11/10/22 1627   • famotidine (PEPCID) injection 20 mg  20 mg Intravenous Q12H Usama Rodriguez MD   20 mg at 11/10/22 2000   • furosemide (LASIX) injection 40 mg  40 mg Intravenous Q12H Usama Rodriguez MD   40 mg at 11/10/22 2018   • influenza vac split quad (FLUZONE,FLUARIX,AFLURIA,FLULAVAL) injection 0.5 mL  0.5 mL Intramuscular During Hospitalization Usama Rodriguez MD       • ipratropium-albuterol (DUO-NEB) nebulizer solution 3 mL  3 mL Nebulization 4x Daily - RT Shani Mendiola APRN   3 mL at 11/11/22 0633   • levETIRAcetam in NaCl 0.75% (KEPPRA) IVPB 1,000 mg  1,000 mg Intravenous Q12H Rosie Nunes MD   1,000 mg at 11/10/22 2000   • levothyroxine sodium injection 88 mcg  88 mcg Intravenous Daily Usama Rodriguez MD   88 mcg at 11/10/22 1128   • LORazepam (ATIVAN)  injection 1 mg  1 mg Intravenous Q4H PRN Usama Rodriguez MD   1 mg at 11/11/22 0408   • Magnesium Sulfate 2 gram Bolus, followed by 8 gram infusion (total Mg dose 10 grams)- Mg less than or equal to 1mg/dL  2 g Intravenous PRN Mary Davis R, APRN        Or   • Magnesium Sulfate 2 gram / 50mL Infusion (GIVE X 3 BAGS TO EQUAL 6GM TOTAL DOSE) - Mg 1.1 - 1.5 mg/dl  2 g Intravenous PRN Mary Davis, APRN        Or   • Magnesium Sulfate 4 gram infusion- Mg 1.6-1.9 mg/dL  4 g Intravenous PRN Mary Davis R, APRN       • methylPREDNISolone sodium succinate (SOLU-Medrol) injection 40 mg  40 mg Intravenous Q12H Shani Mendiola APRN   40 mg at 11/10/22 1959   • mupirocin (BACTROBAN) 2 % nasal ointment 1 application  1 application Each Nare BID Usama Rodriguez MD   1 application at 11/10/22 2001   • norepinephrine (LEVOPHED) 8 mg in 250 mL NS infusion (premix)  0.02-0.3 mcg/kg/min Intravenous Titrated Usama Rodriguez MD   Stopped at 11/10/22 0100   • ondansetron (ZOFRAN) injection 4 mg  4 mg Intravenous Q6H PRN Usama Rodriguez MD       • Pharmacy to Dose enoxaparin (LOVENOX)   Does not apply Continuous PRN Usama Rodriguez MD       • Pharmacy to Dose Zosyn   Does not apply Continuous PRN Carlin Moseley MD       • piperacillin-tazobactam (ZOSYN) 4.5 g/100 mL 0.9% NS IVPB (mbp)  4.5 g Intravenous Q8H Carlin Moseley MD   4.5 g at 11/11/22 0340   • potassium chloride 20 mEq in 50 mL IVPB  20 mEq Intravenous Q1H PRN Mary Davis APRN 50 mL/hr at 11/11/22 0737 20 mEq at 11/11/22 0737   • propofol (DIPRIVAN) infusion 10 mg/mL 100 mL  5-50 mcg/kg/min Intravenous Titrated Usama Rodriguez MD 29.5 mL/hr at 11/11/22 0433 50 mcg/kg/min at 11/11/22 0433   • sodium chloride 0.9 % flush 10 mL  10 mL Intravenous PRN Usama Rodriguez MD       • sodium chloride 0.9 % flush 10 mL  10 mL Intravenous PRN Usama Rodriguez MD       • sodium chloride 0.9 % flush  "10 mL  10 mL Intravenous Q12H Usama Rodriguez MD   10 mL at 11/10/22 2001   • sodium chloride 0.9 % flush 10 mL  10 mL Intravenous PRN Usama Rodriguez MD       • sodium chloride 0.9 % infusion  75 mL/hr Intravenous Continuous Triny Chaves, DO 75 mL/hr at 11/10/22 2317 75 mL/hr at 11/10/22 2317   • vancomycin 1250 mg/250 mL 0.9% NS IVPB ([facility])  1,250 mg Intravenous Q12H Carlin Moseley MD   1,250 mg at 11/11/22 0622       Vital Sign Min/Max for last 24 hours  Temp  Min: 97.4 °F (36.3 °C)  Max: 99.6 °F (37.6 °C)   BP  Min: 102/69  Max: 138/83   Pulse  Min: 85  Max: 104   Resp  Min: 17  Max: 30   SpO2  Min: 93 %  Max: 99 %   No data recorded   No data recorded     Flowsheet Rows    Flowsheet Row First Filed Value   Admission Height 167.6 cm (66\") Documented at 11/09/2022 1225   Admission Weight 98.4 kg (217 lb) Documented at 11/09/2022 1257          Results for orders placed during the hospital encounter of 11/09/22    Adult Transthoracic Echo Complete W/ Cont if Necessary Per Protocol    Interpretation Summary  •  Left ventricular systolic function is severely decreased. Left ventricular ejection fraction appears to be 26 - 30%.  •  Right ventricle not well visualized but appears to have grossly normal size and systolic function.  •  Cardiac valves are poorly visualized, however no obvious abnormalities by Doppler assessment.      Physical Exam:    General Appearance: Intubated mechanically ventilated  Ears: Appear intact with no abnormalities noted  Nose: Nares normal, no drainage  Neck: supple, trachea midline, no carotid bruit and no JVD  Back: no kyphosis present,    Lungs: respirations regular, respirations even and respirations unlabored  Heart: normal S1, S2, no significant murmurs   No gallops or rubs  no rub and no click  Abdomen: normal bowel sounds, no tenderness   Skin: no bleeding, bruising or rash  Extremities: no cyanosis  Psychiatric/Behavioral: Intubated mechanically " ventilated    Results Review:   I reviewed the patient's new clinical results.  I reviewed the patient's new imaging results and agree with the interpretation.  I reviewed the patient's other test results and agree with the interpretation  I personally viewed and interpreted the patient's EKG/Telemetry data    Reviewed available prior notes, consults, prior visits, laboratory findings, radiology and cardiology relevant reports.   Updated chart as applicable.   I have reviewed the patient's medical history in detail and updated the computerized patient record as relevant.          Assessment & Plan       Cardiac arrest (HCC)  Prolonged PEA  Anoxic encephalopathy  History of seizure disorder    Plan    Discussed with nursing  Reconsult cardiology if any meaningful brain functions  Continue on current medical management  Patient's family will decide what looks like after family conference today regarding trajectory of care  Continue telemetry        Oskar Weber MD  11/11/22  07:38 CST    EMR Dragon/Transcription was used to dictate part of this note

## 2022-11-11 NOTE — PROGRESS NOTES
On a separate occasion after seeing the patient, I spoke to her  Joseph.  He is accompanied by patient's mom.  There was another person who came in at the middle of our discussion.  I am accompanied by nurse.  Venue was in the conference room  It took us at least 17 minutes of time in discussion about her condition, surrounding circumstances that happened prior to hospitalization, findings that we know so far.  I encouraged him to ask questions.  Her condition remains critical.  They come to decide that she is a DO NOT RESUSCITATE.  They also want me not to further escalate care.  They said that patient would not want this at all.  They would like to speak with the neurologist.  They are contemplating withdrawing care eventually once we have the information they needed from the neurologist.  Prognosis poor given hypoxic ischemic encephalopathy status post cardiopulmonary resuscitation, V. tach in the setting of heart failure/cardiomyopathy of unclear etiology.

## 2022-11-11 NOTE — PROGRESS NOTES
PULMONARY AND CRITICAL CARE PROGRESS NOTE - [facility]    Patient: Trina Ro    1980    MR# [id number]    Acct# [id number]  11/11/22   08:31 CST  Referring Provider: Usama Rodriguez*    Chief Complaint: Mechanically ventilated    Interval history: She is seen intubated and sedated on propofol.  She is assisting the ventilator.  Oxygen saturation 98% on PEEP of 5 and FiO2 0.3.  ET CO2 36.  Plateau pressure 21.  Minute volume 11 L.  Per nursing she seizes with sedation vacations.  ABG and chest film reviewed and stable.  WBC 27.  She remains on Zosyn and vancomycin.  She is having some short runs of Vtach per nursing.  Afebrile overnight.  No other issues reported.    Meds:  artificial tears, , Both Eyes, Q4H  budesonide, 0.5 mg, Nebulization, BID - RT  Chlorhexidine Gluconate Cloth, 1 application, Topical, Q24H  enoxaparin, 40 mg, Subcutaneous, Daily  famotidine, 20 mg, Intravenous, Q12H  furosemide, 40 mg, Intravenous, Q12H  ipratropium-albuterol, 3 mL, Nebulization, 4x Daily - RT  levETIRAcetam, 1,000 mg, Intravenous, Q12H  levothyroxine sodium, 88 mcg, Intravenous, Daily  methylPREDNISolone sodium succinate, 40 mg, Intravenous, Q12H  mupirocin, 1 application, Each Nare, BID  piperacillin-tazobactam, 4.5 g, Intravenous, Q8H  sodium chloride, 10 mL, Intravenous, Q12H  vancomycin, 1,000 mg, Intravenous, Q12H      amiodarone, 1 mg/min   Followed by  amiodarone, 0.5 mg/min  norepinephrine, 0.02-0.3 mcg/kg/min, Last Rate: Stopped (11/10/22 0100)  Pharmacy to Dose enoxaparin (LOVENOX),   Pharmacy to Dose Zosyn,   propofol, 5-50 mcg/kg/min, Last Rate: 50 mcg/kg/min (11/11/22 0803)  sodium chloride, 75 mL/hr, Last Rate: 75 mL/hr (11/10/22 0527)      Review of Systems:   Cannot obtain due to mechanical ventilated state   Ventilator Settings:        Resp Rate (Set): 16     FiO2 (%): 30 %  PEEP/CPAP (cm H2O): 5 cm H20  Minute Ventilation (L/min) (Obs): 12.5 L/min  Resp Rate (Observed) Vent:  30     I:E Ratio (Obs): 1:1.9  PIP Observed (cm H2O): 22 cm H2O     Physical Exam:  Temp:  [97.4 °F (36.3 °C)-98.6 °F (37 °C)] 98.4 °F (36.9 °C)  Heart Rate:  [] 104  Resp:  [17-30] 30  BP: (102-138)/(67-83) 113/75  Arterial Line BP: ()/(65-71) 99/65  FiO2 (%):  [30 %-40 %] 30 %    Intake/Output Summary (Last 24 hours) at 11/11/2022 0831  Last data filed at 11/11/2022 0737  Gross per 24 hour   Intake 1823.6 ml   Output 2400 ml   Net -576.4 ml     SpO2 Percentage    11/11/22 0639 11/11/22 0644 11/11/22 0700   SpO2: 99% 99% 97%   Body mass index is 33.98 kg/m².   Physical Exam  Constitutional:       General: She is not in acute distress.     Appearance: She is ill-appearing. She is not diaphoretic.      Interventions: She is sedated and intubated.   HENT:      Head: Normocephalic.      Nose: Nose normal.      Mouth/Throat:      Mouth: Mucous membranes are moist.      Comments: OG  Eyes:      General: No scleral icterus.  Neck:      Comments: Right IJ triple-lumen  Cardiovascular:      Rate and Rhythm: Tachycardia present.      Comments: Rate 102  Pulmonary:      Effort: Pulmonary effort is normal. No respiratory distress. She is intubated.      Breath sounds: No wheezing or rhonchi.   Abdominal:      General: There is no distension.   Genitourinary:     Comments: Mae  Musculoskeletal:      Right lower leg: Edema present.      Left lower leg: Edema present.   Skin:     Coloration: Skin is not pale.   Neurological:      Comments: Intubated and sedated       Electronically signed by AIDEN Landers, 11/11/2022, 08:31 CST      Physician Substantive Portion: Medical Decision Making    Laboratory Data:  Results from last 7 days   Lab Units 11/11/22  0245 11/10/22  0430 11/09/22  1213   WBC 10*3/mm3 27.92* 21.24* 16.40*   HEMOGLOBIN g/dL 9.9* 9.9* 9.8*   PLATELETS 10*3/mm3 259 279 247     Results from last 7 days   Lab Units 11/11/22  0246 11/10/22  0430 11/09/22  2315 11/09/22  1213   SODIUM mmol/L 137  135*  --  136   POTASSIUM mmol/L 3.5 3.6 3.6 3.1*   BUN mg/dL 16 15  --  8   CREATININE mg/dL 1.07* 1.01*  --  0.88   INR   --   --   --  1.29*     Results from last 7 days   Lab Units 11/11/22  0351 11/10/22  0411 11/09/22  1455   PH, ARTERIAL pH units 7.425 7.464* 7.250*   PCO2, ARTERIAL mm Hg 31.8* 27.2* 43.6   PO2 ART mm Hg 81.1* 67.8* 122.0*   FIO2 % 30 40 80     No results found for: BLOODCX, URINECX, WOUNDCX, MRSACX, RESPCX, STOOLCX  Recent films:  Adult Transthoracic Echo Complete W/ Cont if Necessary Per Protocol    Result Date: 11/9/2022  •  Left ventricular systolic function is severely decreased. Left ventricular ejection fraction appears to be 26 - 30%. •  Right ventricle not well visualized but appears to have grossly normal size and systolic function. •  Cardiac valves are poorly visualized, however no obvious abnormalities by Doppler assessment.     CT Head Without Contrast    Result Date: 11/9/2022  CT HEAD WO CONTRAST- 11/9/2022 1:47 AM CST  HISTORY: Neuro deficit, acute, stroke suspected   DOSE LENGTH PRODUCT: 1083 mGy cm. Automated exposure control was also utilized to decrease patient radiation dose.  Technique: Axial CT of the brain without IV contrast. Sagittal and coronal reformations are also provided for review. Soft tissue and bone kernels are available for interpretation.  Comparison: None.  Findings:  There is no evidence of acute large vascular territory infarct. No intra-axial or extra-axial hemorrhage. No visualized mass lesion or mass effect. The ventricles, cortical sulci and basal cisterns are symmetric and age appropriate.  Posterior fossa structures are unremarkable. The scalp and calvarium are intact. Visualized paranasal sinuses and mastoids are clear.      Impression: Impression:  1. No acute intracranial process. This report was finalized on 11/09/2022 14:02 by Dr Antonio Ahmadi, .    MRI Brain With & Without Contrast    Result Date: 11/10/2022  Indication: Altered mental  status    Technique: Multisequence, multiplanar MRI of the brain with and without contrast. 20 cc MultiHance IV contrast.  Comparison: CT scan dated 11/9/2022  Findings:  Extensive diffusion signal abnormality on this exam. This includes a diffuse abnormal cortical restricted diffusion as well as bilateral symmetric restricted diffusion in the caudates and putamina. Associated FLAIR abnormality in these areas. There does not appear to be a significant edema at this time and the cortical sulci are not effaced. Lateral and third ventricles are not effaced. Basal cisterns are patent. Posterior fossa structures are unremarkable. Central arterial flow voids are well preserved. There is no acute intra-axial or extra-axial hemorrhage. Orbital contents are unremarkable. There is fluid layering in the nasopharynx.      Impression: Impression:  1. Bilateral symmetric abnormal cortical diffusion restriction as well as symmetric diffusion signal abnormality in the caudates and putamina, appearance suggesting hypoxic ischemic encephalopathy. 2. No significant edema/mass effect at this time.  This report was finalized on 11/10/2022 16:31 by Dr Antonio Ahmadi, .    XR Chest 1 View    Result Date: 11/11/2022  EXAMINATION: XR CHEST 1 VW-  11/11/2022 3:30 AM CST  HISTORY: on vent, bilateral lung infiltrates; I46.9-Cardiac arrest, cause unspecified; J81.0-Acute pulmonary edema  A frontal projection of the chest is compared with the previous study dated 11/10/2022.  Bilateral lung opacities representing pulmonary edema persist. It appears more pronounced on the right side, similar to the previous study.  There is left lower lung consolidation similar to the previous study. There is probable left basal pleural effusion. No change.  There is no pneumothorax.  There is moderate cardiomegaly.  No acute bony abnormality.  Endotracheal tube, nasogastric tube and right internal jugular venous access lines are in place.      Impression: 1. No  change. This report was finalized on 11/11/2022 06:27 by Dr. Khloe Hughes MD.    XR Chest 1 View    Result Date: 11/10/2022  HISTORY: Intubated, lung infiltrates  CXR: A frontal view the chest obtained. Patient rotated to the left.  COMPARISON: 11/9/2022  FINDINGS: Endotracheal tube tip appropriate in position at the T2/T3 level. Enteric tube descends into the abdomen. Right IJ central line in place with tip overlying the SVC.  Stable mild cardiomegaly. Small left pleural effusion. Perihilar densities favoring mild edema, improved from yesterday's exam. No pneumothorax.      Impression: 1. Appropriate line support lines. Interval placement of an enteric tube. 2. Mild improving perihilar opacities favoring improving but residual edema. Small left pleural effusion. Stable mediastinal contours. This report was finalized on 11/10/2022 07:42 by Dr. Kayla Fallon MD.    XR Chest 1 View    Result Date: 11/9/2022  XR CHEST 1 VW- 11/9/2022 12:40 PM CST  HISTORY: CVC placement, ETT placement verification  COMPARISON: None.  FINDINGS:  Status post endotracheal intubation. Endotracheal tube is in good position. There is a right IJ central venous catheter with tip projecting over the right atrium. Cardiomegaly is present. There are bilateral hazy pulmonary infiltrates, central predominant, suggesting pulmonary edema. Lungs are well expanded. No pleural effusion or pneumothorax. Defibrillator pad is in place. No acute bony abnormality seen.      Impression: 1. Endotracheal tube is in good position. Lungs are well expanded. 2. Bilateral central predominant hazy pulmonary infiltrates concerning for pulmonary edema. 3. Central venous catheter is in good position.   This report was finalized on 11/09/2022 13:03 by Dr Antonio Ahmadi, .    CT Angiogram Chest    Result Date: 11/9/2022  CT ANGIOGRAM CHEST- 11/9/2022 1:47 AM CST  HISTORY: Pulmonary embolism (PE) suspected, unknown D-dimer  COMPARISON: Chest x-ray dated 11/9/2022.   DOSE LENGTH PRODUCT: 247 mGy cm. Automated exposure control was also utilized to decrease patient radiation dose.  TECHNIQUE: Helical tomographic images of the chest were obtained after the administration of intravenous contrast following angiogram protocol. Additionally, 3D and multiplanar reformatted images were provided.    FINDINGS:  Pulmonary arteries: There is adequate enhancement of the pulmonary arteries to evaluate for central and segmental pulmonary emboli. There are no filling defects within the main, lobar, segmental or visualized subsegmental pulmonary arteries. The pulmonary arteries are within normal limits for size.  Aorta and great vessels: Thoracic aorta is normal in caliber. No dissection identified. No flow-limiting stenosis identified at the great vessel origins.  Visualized neck base: The imaged portion of the base of the neck appears unremarkable.  Lungs: Bilateral septal thickening and mixed groundglass and consolidative dependent airspace opacities reflecting pulmonary edema. There are bilateral small layering pleural effusions. Endotracheal tube is present with tip approximately 4.2 cm above the lori. No pneumothorax. Minimal secretions in the trachea. Airways are otherwise clear.  Heart: Enlarged left ventricle. There is no pericardial effusion.  Mediastinum and lymph nodes: No suspicious hilar or mediastinal adenopathy..  Skeletal and soft tissues: Chest wall soft tissues are unremarkable. No acute bony abnormality.   Upper abdomen: The imaged portion of the upper abdomen demonstrates no acute process.      Impression: 1. No evidence of pulmonary embolus. 2. Enlarged left ventricle. Pulmonary edema with bilateral small layering pleural effusions. 3. Endotracheal tube is in good position.   This report was finalized on 11/09/2022 14:11 by Dr Antonio Ahmadi, .    XR Abdomen KUB    Result Date: 11/10/2022  XR ABDOMEN KUB- 11/9/2022 10:08 PM CST  HISTORY: og placement; I46.9-Cardiac arrest,  cause unspecified; J81.0-Acute pulmonary edema  FINDINGS:  Feeding tube is seen with the tip located in the lower gastric body  The visualized intestinal gas pattern is unremarkable without evidence of obstruction.  Consolidation is seen in the retrocardiac space. Cholecystectomy clips.       Impression: Feeding tube tip located in the lower gastric body.  This report was finalized on 11/10/2022 07:18 by Dr Antonio Ahmadi, .     My radiograph interpretation/independent review of imaging: Reviewed and agree with current interpretation    Pulmonary Assessment:  1. Acute hypoxic respiratory failure  2. Oral intubation and mechanical ventilation  3. Out-of-hospital cardiac arrest s/p CPR multiple defibrillator shocks  4. Dilated and ischemic cardiomyopathy with low ejection fraction  5. Acute on chronic congestive systolic and diastolic heart failure  6. Seizure activity possible anoxic encephalopathy  7. History of seizure activity in the past  8. Hypertension  9. Diabetes mellitus type 2  10. History of thyroid cancer and hypothyroidism  11. Tobacco abuse/possible COPD  12. Anxiety and depression  13. Hepatitis C positive with transaminitis  14. Allergic rhinitis    Recommend/plan:   · Patient's condition worsened this morning and she developed ventricular tachycardia and cardiac arrest  · She already had ongoing multiple medical problems and had significant cardiomyopathy with low ejection fraction  · She had seizure activity from severe anoxic encephalopathy followed by neurology.  · As she had no significant chance of meaningful recovery patient's family decided to go for comfort measures  · Accordingly all treatment was withdrawn and patient was terminally extubated and she passed away in the intensive care unit in presence of family members  · I reviewed the chart and got the updates about current events.  · We appreciate the consult and like to thank the hospitalist team for the referral      This visit was  performed by both a physician and an [facility] RN.  I personally evaluated and examined the patient.  I performed all aspects of the medical decision making as documented.    Electronically signed by     Carlin Moseley MD,  Pulmonologist/Intensivist   11/11/2022, 20:01 CST

## 2022-11-11 NOTE — PROGRESS NOTES
Neurology Progress Note      Chief Complaint:    Hypoxic ischemic encephalopathy  Recurrent ventricular dysrhythmia    Subjective     Subjective:  This morning, the patient demonstrated increasing cardiac instability with recurrent ventricular tachycardia requiring cardioversion/defibrillation on several instances.  Patient has 4 family members present at bedside and they are awaiting her father to arrive.  They did contact her father via speaker phone for family conference in the patient's room today.    The patient continues to demonstrate ventricular irritability with short bursts of nonsustained, polymorphic-appearing ventricular tachycardia on cardiac telemetry monitoring during the encounter.    Sedation was withheld for neurologic examination today.  She has been demonstrating no purposeful movements or withdrawal to noxious stimuli.  Patient's family is requesting pursuing comfort measures only at this time.      Past Medical History:   Diagnosis Date   • Anxiety state    • Cancer (HCC)     THYROID   • Endogenous obesity    • History of malignant neoplasm of thyroid    • Postoperative hypothyroidism    • Type 2 diabetes mellitus (HCC)    • Vitamin D deficiency      Past Surgical History:   Procedure Laterality Date   • CHOLECYSTECTOMY     • THYROIDECTOMY     • TONSILLECTOMY       Family History   Problem Relation Age of Onset   • Hypertension Other    • Thyroid disease Other    • Diabetes Other    • Heart disease Other    • Stroke Other    • Breast cancer Other    • Cholelithiasis Other    • Kidney disease Other    • Pancreatic cancer Other    • Prostate cancer Father    • Stroke Paternal Grandmother      Social History     Tobacco Use   • Smoking status: Every Day     Types: Cigarettes   • Smokeless tobacco: Never   • Tobacco comments:     smoking cessation encouraged    Substance Use Topics   • Alcohol use: No   • Drug use: No       Medications:  Current Facility-Administered Medications   Medication  Dose Route Frequency Provider Last Rate Last Admin   • acetaminophen (TYLENOL) suppository 650 mg  650 mg Rectal Q4H PRN Usama Rodriguez MD       • amiodarone 360 mg in 200 mL D5W infusion  1 mg/min Intravenous Continuous Oskar Weber MD 33.3 mL/hr at 11/11/22 0900 1 mg/min at 11/11/22 0900    Followed by   • amiodarone 360 mg in 200 mL D5W infusion  0.5 mg/min Intravenous Continuous Oskar Weber MD       • artificial tears ophthalmic ointment   Both Eyes Q4H Usama Rodriguez MD   Given at 11/11/22 0340   • budesonide (PULMICORT) nebulizer solution 0.5 mg  0.5 mg Nebulization BID - RT Carlin Moseley MD   0.5 mg at 11/11/22 0639   • Chlorhexidine Gluconate Cloth 2 % pads 1 application  1 application Topical Q24H Usama Rodriguez MD   1 application at 11/11/22 0340   • Enoxaparin Sodium (LOVENOX) syringe 40 mg  40 mg Subcutaneous Daily Shani Mendiola APRN   40 mg at 11/10/22 1627   • famotidine (PEPCID) injection 20 mg  20 mg Intravenous Q12H Usama Rodriguez MD   20 mg at 11/10/22 2000   • furosemide (LASIX) injection 40 mg  40 mg Intravenous Q12H Usama Rodriguez MD   40 mg at 11/10/22 2018   • influenza vac split quad (FLUZONE,FLUARIX,AFLURIA,FLULAVAL) injection 0.5 mL  0.5 mL Intramuscular During Hospitalization Usama Rodriguez MD       • ipratropium-albuterol (DUO-NEB) nebulizer solution 3 mL  3 mL Nebulization 4x Daily - RT Shani Mendiola APRN   3 mL at 11/11/22 0633   • levETIRAcetam in NaCl 0.75% (KEPPRA) IVPB 1,000 mg  1,000 mg Intravenous Q12H Rosie Nunes MD   1,000 mg at 11/11/22 0826   • levothyroxine sodium injection 88 mcg  88 mcg Intravenous Daily Usama Rodriguez MD   88 mcg at 11/10/22 1128   • LORazepam (ATIVAN) injection 1 mg  1 mg Intravenous Q4H PRN Usama Rodriguez MD   2 mg at 11/11/22 0952   • Magnesium Sulfate 2 gram Bolus, followed by 8 gram infusion (total Mg dose 10 grams)- Mg less than or equal  to 1mg/dL  2 g Intravenous PRN Mary Davis APRN        Or   • Magnesium Sulfate 2 gram / 50mL Infusion (GIVE X 3 BAGS TO EQUAL 6GM TOTAL DOSE) - Mg 1.1 - 1.5 mg/dl  2 g Intravenous PRN Mary Davis, APRN        Or   • Magnesium Sulfate 4 gram infusion- Mg 1.6-1.9 mg/dL  4 g Intravenous PRN Mary Davis APRN       • methylPREDNISolone sodium succinate (SOLU-Medrol) injection 40 mg  40 mg Intravenous Q12H Shani Mendiola APRN   40 mg at 11/10/22 1959   • morphine injection 4 mg  4 mg Intravenous Q2H PRN Usama Rodriguez MD       • mupirocin (BACTROBAN) 2 % nasal ointment 1 application  1 application Each Nare BID Usama Rodriguez MD   1 application at 11/10/22 2001   • norepinephrine (LEVOPHED) 8 mg in 250 mL NS infusion (premix)  0.02-0.3 mcg/kg/min Intravenous Titrated Usama Rodriguez MD   Stopped at 11/10/22 0100   • ondansetron (ZOFRAN) injection 4 mg  4 mg Intravenous Q6H PRN Usama Rodriguez MD       • Pharmacy to Dose enoxaparin (LOVENOX)   Does not apply Continuous PRN Usama Rodriguez MD       • Pharmacy to Dose Zosyn   Does not apply Continuous PRN Carlin Moseley MD       • piperacillin-tazobactam (ZOSYN) 4.5 g/100 mL 0.9% NS IVPB (mbp)  4.5 g Intravenous Q8H Carlin Moseley MD   4.5 g at 11/11/22 0340   • potassium chloride 20 mEq in 50 mL IVPB  20 mEq Intravenous Q1H PRN Mary Davis APRN 50 mL/hr at 11/11/22 0824 20 mEq at 11/11/22 0824   • propofol (DIPRIVAN) infusion 10 mg/mL 100 mL  5-50 mcg/kg/min Intravenous Titrated Usama Rodriguez MD 29.5 mL/hr at 11/11/22 0825 50 mcg/kg/min at 11/11/22 0825   • sodium chloride 0.9 % flush 10 mL  10 mL Intravenous PRUsama Castrejon MD       • sodium chloride 0.9 % flush 10 mL  10 mL Intravenous PRUsama Castrejon MD       • sodium chloride 0.9 % flush 10 mL  10 mL Intravenous Q12H Usama Rodriguez MD   10 mL at 11/10/22 2001   • sodium chloride 0.9 % flush 10  mL  10 mL Intravenous PRN Usama Rodriguez MD       • vancomycin (VANCOCIN) 1,000 mg in sodium chloride 0.9 % 250 mL IVPB  1,000 mg Intravenous Q12H Usama Rodriguez MD           Allergies:    Patient has no known allergies.    Review of Systems:   -A 14-point review of systems is completed and is negative.      Objective      Vital Signs  Temp:  [97.4 °F (36.3 °C)-98.6 °F (37 °C)] 98.4 °F (36.9 °C)  Heart Rate:  [0-135] 0  Resp:  [17-30] 30  BP: ()/(41-83) 90/41  Arterial Line BP: ()/(65-70) 99/65  FiO2 (%):  [30 %-40 %] 30 %    Physical Exam:    General Exam:  Head:  Normocephalic, atraumatic  HEENT:  Neck supple  CVS:  Regular rate and rhythm.    Lungs: Mechanical breath sounds.  Abdomen: Scaphoid.  Skin:  No rashes      Neurologic Exam:  Mental Status:    -Sedated, on mechanical ventilation.  Sedation was withheld for the remainder of the examination.  -No purposeful movements.  -Does not withdrawal to noxious stimuli x4 extremities.  -No corneal reflex.  -No gag reflex to movement of ET tube or deep suctioning.  -Pupils 4 mm.  -Negative doll's eyes.     CN II: Cannot be tested due to neurologic status.  Pupils equally reactive to light.  CN III, IV, VI:  Cannot be tested due to neurologic status.  CN V:  Cannot be tested due to neurologic status.  CN VII:  Cannot be tested due to neurologic status.  CN VIII:  Cannot be tested due to neurologic status.  Does not respond to verbal stimulus.  CN IX/X:  Cannot be tested due to neurologic status.  CN XI:  Cannot be tested due to neurologic status.  CN XII:  Cannot be tested due to neurologic status.     Motor:   -No purposeful movement, no spontaneous movement, no withdrawal to noxious stimuli in all 4 extremities.     Deep Tendon Reflexes:  -Diminished in all 4 extremities.  -Babinski is negative.            Sensory:  -Cannot be tested due to neurologic status.     Coordination:  -Cannot be tested due to neurologic status.    Results  Review:    Most recent MRI is reviewed with the patient's family in detail.  This demonstrates extensive cortical injury and findings of hypoxic injury involving the caudate and putamen as well.        Lab Results (last 24 hours)     Procedure Component Value Units Date/Time    Respiratory Culture - Sputum, Bronchus [[id number]] Collected: 11/09/22 2249    Specimen: Sputum from Bronchus Updated: 11/11/22 1013     Respiratory Culture Light growth (2+) The culture consists of normal respiratory johnny. This is a preliminary report; final report to follow.     Gram Stain Moderate (3+) WBCs seen      Moderate (3+) Mixed gram positive johnny    Magnesium [[id number]]  (Normal) Collected: 11/11/22 0246    Specimen: Blood Updated: 11/11/22 0725     Magnesium 2.0 mg/dL     Blood Gas, Arterial - [[id number]]  (Abnormal) Collected: 11/11/22 0351    Specimen: Arterial Blood Updated: 11/11/22 0344     Site Right Radial     Dagoberto's Test Positive     pH, Arterial 7.425 pH units      pCO2, Arterial 31.8 mm Hg      Comment: 84 Value below reference range        pO2, Arterial 81.1 mm Hg      Comment: 84 Value below reference range        HCO3, Arterial 20.8 mmol/L      Base Excess, Arterial -3.0 mmol/L      Comment: 84 Value below reference range        O2 Saturation, Arterial 97.2 %      Temperature 37.0 C      Barometric Pressure for Blood Gas 744 mmHg      Modality Ventilator     FIO2 30 %      Ventilator Mode AC     Set Tidal Volume 500     Set Mech Resp Rate 16.0     PEEP 5.0     Collected by [id number]     Comment: Meter: [id number]     :  [id number]        pCO2, Temperature Corrected 31.8 mm Hg      pH, Temp Corrected 7.425 pH Units      pO2, Temperature Corrected 81.1 mm Hg     Basic Metabolic Panel [[id number]]  (Abnormal) Collected: 11/11/22 0246    Specimen: Blood Updated: 11/11/22 0320     Glucose 224 mg/dL      BUN 16 mg/dL      Creatinine 1.07 mg/dL      Sodium 137 mmol/L      Potassium 3.5 mmol/L      Chloride 103  mmol/L      CO2 21.0 mmol/L      Calcium 7.9 mg/dL      BUN/Creatinine Ratio 15.0     Anion Gap 13.0 mmol/L      eGFR 65.8 mL/min/1.73      Comment: National Kidney Foundation and American Society of Nephrology (ASN) Task Force recommended calculation based on the Chronic Kidney Disease Epidemiology Collaboration (CKD-EPI) equation refit without adjustment for race.       Narrative:      GFR Normal >60  Chronic Kidney Disease <60  Kidney Failure <15      CBC & Differential [[id number]]  (Abnormal) Collected: 11/11/22 0245    Specimen: Blood Updated: 11/11/22 0302    Narrative:      The following orders were created for panel order CBC & Differential.  Procedure                               Abnormality         Status                     ---------                               -----------         ------                     CBC Auto Differential[[id number]]        Abnormal            Final result                 Please view results for these tests on the individual orders.    CBC Auto Differential [[id number]]  (Abnormal) Collected: 11/11/22 0245    Specimen: Blood Updated: 11/11/22 0302     WBC 27.92 10*3/mm3      RBC 4.07 10*6/mm3      Hemoglobin 9.9 g/dL      Hematocrit 33.1 %      MCV 81.3 fL      MCH 24.3 pg      MCHC 29.9 g/dL      RDW 16.2 %      RDW-SD 48.5 fl      MPV 10.5 fL      Platelets 259 10*3/mm3      Neutrophil % 91.8 %      Lymphocyte % 3.6 %      Monocyte % 3.3 %      Eosinophil % 0.0 %      Basophil % 0.1 %      Immature Grans % 1.2 %      Neutrophils, Absolute 25.62 10*3/mm3      Lymphocytes, Absolute 1.01 10*3/mm3      Monocytes, Absolute 0.92 10*3/mm3      Eosinophils, Absolute 0.00 10*3/mm3      Basophils, Absolute 0.04 10*3/mm3      Immature Grans, Absolute 0.33 10*3/mm3      nRBC 0.0 /100 WBC     Blood Culture - Blood, Hand, Right [[id number]]  (Normal) Collected: 11/09/22 2129    Specimen: Blood from Hand, Right Updated: 11/10/22 2216     Blood Culture No growth at 24 hours    Blood Culture -  Blood, Arm, Right [[id number]]  (Normal) Collected: 11/09/22 1213    Specimen: Blood from Arm, Right Updated: 11/10/22 1331     Blood Culture No growth at 24 hours    Troponin [[id number]]  (Abnormal) Collected: 11/10/22 1150    Specimen: Blood Updated: 11/10/22 1227     Troponin T 0.283 ng/mL     Narrative:      Troponin T Reference Range:  <= 0.03 ng/mL-   Negative for AMI  >0.03 ng/mL-     Abnormal for myocardial necrosis.  Clinicians would have to utilize clinical acumen, EKG, Troponin and serial changes to determine if it is an Acute Myocardial Infarction or myocardial injury due to an underlying chronic condition.       Results may be falsely decreased if patient taking Biotin.            Imaging Results (Last 24 Hours)     Procedure Component Value Units Date/Time    XR Chest 1 View [[id number]] Collected: 11/11/22 0626     Updated: 11/11/22 0630    Narrative:      EXAMINATION: XR CHEST 1 VW-     11/11/2022 3:30 AM CST     HISTORY: on vent, bilateral lung infiltrates; I46.9-Cardiac arrest,  cause unspecified; J81.0-Acute pulmonary edema     A frontal projection of the chest is compared with the previous study  dated 11/10/2022.     Bilateral lung opacities representing pulmonary edema persist. It  appears more pronounced on the right side, similar to the previous  study.     There is left lower lung consolidation similar to the previous study.  There is probable left basal pleural effusion. No change.     There is no pneumothorax.     There is moderate cardiomegaly.     No acute bony abnormality.     Endotracheal tube, nasogastric tube and right internal jugular venous  access lines are in place.       Impression:      1. No change.  This report was finalized on 11/11/2022 06:27 by Dr. Khloe Hughse MD.    MRI Brain With & Without Contrast [[id number]] Collected: 11/10/22 1622     Updated: 11/10/22 1634    Narrative:      Indication: Altered mental status        Technique: Multisequence, multiplanar MRI of the  brain with and without  contrast. 20 cc MultiHance IV contrast.     Comparison: CT scan dated 11/9/2022     Findings:      Extensive diffusion signal abnormality on this exam. This includes a  diffuse abnormal cortical restricted diffusion as well as bilateral  symmetric restricted diffusion in the caudates and putamina. Associated  FLAIR abnormality in these areas. There does not appear to be a  significant edema at this time and the cortical sulci are not effaced.  Lateral and third ventricles are not effaced. Basal cisterns are patent.  Posterior fossa structures are unremarkable. Central arterial flow voids  are well preserved. There is no acute intra-axial or extra-axial  hemorrhage. Orbital contents are unremarkable. There is fluid layering  in the nasopharynx.       Impression:      Impression:     1. Bilateral symmetric abnormal cortical diffusion restriction as well  as symmetric diffusion signal abnormality in the caudates and putamina,  appearance suggesting hypoxic ischemic encephalopathy.  2. No significant edema/mass effect at this time.     This report was finalized on 11/10/2022 16:31 by Dr Antonio Ahmadi, .          Assessment/Plan     Hospital Problem List      Cardiac arrest (HCC)      Impression    • Anoxic brain injury not felt to be survivable  • No evidence of higher level cortical function  • Loss of some bulbar reflexes  • No seizure activity  • Recurrent ventricular dysrhythmia    Plan    • Extensive family conference today.  Neuroimaging has been reviewed with family.  Patient's father was also on speaker phone for this and is attempting to make it to the hospital in the next 30 minutes.  • Patient continues to demonstrate ventricular irritability with short runs of nonsustained, polymorphic ventricular tachycardia during examination.  • Patient's family voiced understanding that she is not likely able to survive this anoxic injury and this has been explained in conjunction with her  neuroimaging.  They voiced understanding and agreement with the plan of care and elect to avoid any further valiant measures and resuscitative efforts such as cardioversion or defibrillation.  They state this would be contrary to her previously expressed wishes.  They would like to pursue comfort measures.  • Prognosis is poor.  No evidence of higher level cortical functioning and abnormal bulbar reflexes present.      Greater than 30 minutes of critical care time spent in the evaluation and treatment of potential life-threatening hypoxic ischemic encephalopathy/anoxic brain injury with recurrent ventricular dysrhythmia earlier today, family conference, review of neuroimaging, discussion of available treatment options and development of plan of care.        Jeff Odonnell PA-C  11/11/22  11:18 CST

## 2022-11-12 LAB
BACTERIA SPEC RESP CULT: NORMAL
GRAM STN SPEC: NORMAL
GRAM STN SPEC: NORMAL

## 2022-11-12 NOTE — PAYOR COMM NOTE
" 22  [id number]    April Mukherjee (Dcsd. Female)     Date of Birth   1980    Social Security Number       Address   [address] [place] [address] [address] [facility] [address]    Home Phone   [phone]    MRN   [id number]       Sikhism   Zoroastrian    Marital Status                               Admission Date   22    Admission Type   Emergency    Admitting Provider   Usama Rodriguez MD    Attending Provider       Department, Room/Bed   [facility] CARDIAC CARE, C008/1       Discharge Date   2022    Discharge Disposition       Discharge Destination                               Attending Provider: (none)   Allergies: No Known Allergies    Isolation: None   Infection: None   Code Status: Prior    Ht: 167.6 cm (66\")   Wt: 95.5 kg (210 lb 8 oz)    Admission Cmt: None   Principal Problem: Cardiac arrest (HCC) [I46.9]                 Active Insurance as of 2022     Primary Coverage     Payor Plan Insurance Group Employer/Plan Group    [employer] [place] [employer] [place]ICA[employer]      Payor Plan Address Payor Plan Phone Number Payor Plan Fax Number Effective Dates    [address] [address] [phone]  2016 - None Entered    [facility] [id number]       Subscriber Name Subscriber Birth Date Member ID       APRIL MUKHERJEE 1980 [id number]                 Emergency Contacts      (Rel.) Home Phone Work Phone Mobile Phone    Joseph Benjamin (Spouse) -- -- [phone]    Martha Mukherjee (Mother) -- -- [phone]               Discharge Summary      Usama Rodriguez MD at 22 1010        Family finally decided to palliatively extubate their relative.   She passed away at 0959-hour peacefully.  Patient admitted status post cardiopulmonary resuscitation.  Patient reportedly had multiple defibrillation done  By the time she arrived in the emergency room she was reportedly PEA arrest.  Epinephrine was given x1.  She got initially code chill however after reviewing " the inclusion and exclusion criteria, she was disqualified because of exclusion criteria.  Patient was seen in consult by neurology.  She was started on Keppra.  Pulmonary assisted and ventilator management.  Cardiology seen patient in consultation because of cardiomyopathy with EF of 26 to 30%.  Etiology unclear  Ultimately, patient's relatives decided to pursue comfort measures only after an extensive discussion of condition, management option and prognosis.       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

## 2022-11-14 LAB
BACTERIA SPEC AEROBE CULT: NORMAL
BACTERIA SPEC AEROBE CULT: NORMAL

## 2022-11-16 ENCOUNTER — TELEPHONE (OUTPATIENT)
Dept: NEUROLOGY | Facility: CLINIC | Age: 42
End: 2022-11-16